# Patient Record
Sex: FEMALE | Race: WHITE | NOT HISPANIC OR LATINO | Employment: OTHER | URBAN - METROPOLITAN AREA
[De-identification: names, ages, dates, MRNs, and addresses within clinical notes are randomized per-mention and may not be internally consistent; named-entity substitution may affect disease eponyms.]

---

## 2017-02-07 ENCOUNTER — ALLSCRIPTS OFFICE VISIT (OUTPATIENT)
Dept: OTHER | Facility: OTHER | Age: 67
End: 2017-02-07

## 2017-03-08 ENCOUNTER — LAB CONVERSION - ENCOUNTER (OUTPATIENT)
Dept: OTHER | Facility: OTHER | Age: 67
End: 2017-03-08

## 2017-03-08 ENCOUNTER — GENERIC CONVERSION - ENCOUNTER (OUTPATIENT)
Dept: OTHER | Facility: OTHER | Age: 67
End: 2017-03-08

## 2017-03-08 LAB
DEPRECATED RDW RBC AUTO: 14.4 % (ref 11–15)
HCT VFR BLD AUTO: 42.8 % (ref 35–45)
HGB BLD-MCNC: 13.9 G/DL (ref 11.7–15.5)
MCH RBC QN AUTO: 26.9 PG (ref 27–33)
MCHC RBC AUTO-ENTMCNC: 32.4 G/DL (ref 32–36)
MCV RBC AUTO: 82.9 FL (ref 80–100)
PLATELET # BLD AUTO: 234 THOUSAND/UL (ref 140–400)
PMV BLD AUTO: 8.4 FL (ref 7.5–12.5)
RBC # BLD AUTO: 5.16 MILLION/UL (ref 3.8–5.1)
TSH SERPL DL<=0.05 MIU/L-ACNC: 0.27 MIU/L (ref 0.4–4.5)
WBC # BLD AUTO: 6.6 THOUSAND/UL (ref 3.8–10.8)

## 2017-03-09 ENCOUNTER — ALLSCRIPTS OFFICE VISIT (OUTPATIENT)
Dept: OTHER | Facility: OTHER | Age: 67
End: 2017-03-09

## 2017-04-13 ENCOUNTER — ALLSCRIPTS OFFICE VISIT (OUTPATIENT)
Dept: OTHER | Facility: OTHER | Age: 67
End: 2017-04-13

## 2017-04-13 DIAGNOSIS — M54.16 RADICULOPATHY OF LUMBAR REGION: ICD-10-CM

## 2017-04-14 ENCOUNTER — HOSPITAL ENCOUNTER (OUTPATIENT)
Dept: RADIOLOGY | Facility: CLINIC | Age: 67
Discharge: HOME | End: 2017-04-14
Payer: COMMERCIAL

## 2017-04-14 DIAGNOSIS — M54.16 RADICULOPATHY OF LUMBAR REGION: ICD-10-CM

## 2017-04-14 PROCEDURE — 72100 X-RAY EXAM L-S SPINE 2/3 VWS: CPT

## 2017-04-18 ENCOUNTER — GENERIC CONVERSION - ENCOUNTER (OUTPATIENT)
Dept: OTHER | Facility: OTHER | Age: 67
End: 2017-04-18

## 2017-04-25 ENCOUNTER — LAB CONVERSION - ENCOUNTER (OUTPATIENT)
Dept: OTHER | Facility: OTHER | Age: 67
End: 2017-04-25

## 2017-04-25 LAB — TSH SERPL DL<=0.05 MIU/L-ACNC: 1.39 MIU/L (ref 0.4–4.5)

## 2017-04-27 ENCOUNTER — ALLSCRIPTS OFFICE VISIT (OUTPATIENT)
Dept: OTHER | Facility: OTHER | Age: 67
End: 2017-04-27

## 2017-06-06 ENCOUNTER — GENERIC CONVERSION - ENCOUNTER (OUTPATIENT)
Dept: OTHER | Facility: OTHER | Age: 67
End: 2017-06-06

## 2017-06-07 ENCOUNTER — GENERIC CONVERSION - ENCOUNTER (OUTPATIENT)
Dept: OTHER | Facility: OTHER | Age: 67
End: 2017-06-07

## 2017-08-17 ENCOUNTER — ALLSCRIPTS OFFICE VISIT (OUTPATIENT)
Dept: OTHER | Facility: OTHER | Age: 67
End: 2017-08-17

## 2017-11-03 ENCOUNTER — LAB CONVERSION - ENCOUNTER (OUTPATIENT)
Dept: OTHER | Facility: OTHER | Age: 67
End: 2017-11-03

## 2017-11-03 LAB
A/G RATIO (HISTORICAL): 1.6 (CALC) (ref 1–2.5)
ALBUMIN SERPL BCP-MCNC: 4.1 G/DL (ref 3.6–5.1)
ALP SERPL-CCNC: 77 U/L (ref 33–130)
ALT SERPL W P-5'-P-CCNC: 9 U/L (ref 6–29)
AST SERPL W P-5'-P-CCNC: 13 U/L (ref 10–35)
BILIRUB SERPL-MCNC: 0.8 MG/DL (ref 0.2–1.2)
BUN SERPL-MCNC: 13 MG/DL (ref 7–25)
BUN/CREA RATIO (HISTORICAL): NORMAL (CALC) (ref 6–22)
CALCIUM SERPL-MCNC: 9.2 MG/DL (ref 8.6–10.4)
CHLORIDE SERPL-SCNC: 106 MMOL/L (ref 98–110)
CO2 SERPL-SCNC: 27 MMOL/L (ref 20–31)
CREAT SERPL-MCNC: 0.94 MG/DL (ref 0.5–0.99)
DEPRECATED RDW RBC AUTO: 13.1 % (ref 11–15)
EGFR AFRICAN AMERICAN (HISTORICAL): 73 ML/MIN/1.73M2
EGFR-AMERICAN CALC (HISTORICAL): 63 ML/MIN/1.73M2
GAMMA GLOBULIN (HISTORICAL): 2.6 G/DL (CALC) (ref 1.9–3.7)
GLUCOSE (HISTORICAL): 99 MG/DL (ref 65–99)
HCT VFR BLD AUTO: 40.6 % (ref 35–45)
HGB BLD-MCNC: 13.7 G/DL (ref 11.7–15.5)
MCH RBC QN AUTO: 29.1 PG (ref 27–33)
MCHC RBC AUTO-ENTMCNC: 33.7 G/DL (ref 32–36)
MCV RBC AUTO: 86.4 FL (ref 80–100)
PLATELET # BLD AUTO: 231 THOUSAND/UL (ref 140–400)
PMV BLD AUTO: 9.8 FL (ref 7.5–12.5)
POTASSIUM SERPL-SCNC: 4.3 MMOL/L (ref 3.5–5.3)
RBC # BLD AUTO: 4.7 MILLION/UL (ref 3.8–5.1)
SODIUM SERPL-SCNC: 143 MMOL/L (ref 135–146)
TOTAL PROTEIN (HISTORICAL): 6.7 G/DL (ref 6.1–8.1)
TSH SERPL DL<=0.05 MIU/L-ACNC: 3.1 MIU/L (ref 0.4–4.5)
WBC # BLD AUTO: 6.9 THOUSAND/UL (ref 3.8–10.8)

## 2017-11-06 ENCOUNTER — GENERIC CONVERSION - ENCOUNTER (OUTPATIENT)
Dept: OTHER | Facility: OTHER | Age: 67
End: 2017-11-06

## 2017-12-21 ENCOUNTER — GENERIC CONVERSION - ENCOUNTER (OUTPATIENT)
Dept: OTHER | Facility: OTHER | Age: 67
End: 2017-12-21

## 2017-12-28 ENCOUNTER — ALLSCRIPTS OFFICE VISIT (OUTPATIENT)
Dept: OTHER | Facility: OTHER | Age: 67
End: 2017-12-28

## 2018-01-10 NOTE — RESULT NOTES
Verified Results  (1) COMPREHENSIVE METABOLIC PANEL [id number] 48:40HK [employer]     Test Name Result Flag Reference   GLUCOSE 87 mg/dL  65-99   Fasting reference interval   UREA NITROGEN (BUN) 10 mg/dL  7-25   CREATININE 0 83 mg/dL  0 50-0 99   For patients >52years of age, the reference limit  for Creatinine is approximately 13% higher for people  identified as -American  eGFR NON-AFR  AMERICAN 75 mL/min/1 73m2  > OR = 60   eGFR AFRICAN AMERICAN 86 mL/min/1 73m2  > OR = 60   BUN/CREATININE RATIO   1-15   NOT APPLICABLE (calc)   SODIUM 142 mmol/L  135-146   POTASSIUM 4 4 mmol/L  3 5-5 3   CHLORIDE 107 mmol/L     CARBON DIOXIDE 26 mmol/L  20-31   CALCIUM 9 0 mg/dL  8 6-10 4   PROTEIN, TOTAL 6 5 g/dL  6 1-8 1   ALBUMIN 3 9 g/dL  3 6-5 1   GLOBULIN 2 6 g/dL (calc)  1 9-3 7   ALBUMIN/GLOBULIN RATIO 1 5 (calc)  1 0-2 5   BILIRUBIN, TOTAL 0 5 mg/dL  0 2-1 2   ALKALINE PHOSPHATASE 90 U/L     AST 14 U/L  10-35   ALT 9 U/L  6-29     (1) LIPID PANEL, FASTING [id number] 10:54AM Razia Alvarado     Test Name Result Flag Reference   CHOLESTEROL, TOTAL 183 mg/dL  125-200   HDL CHOLESTEROL 38 mg/dL L > OR = 46   TRIGLICERIDES 024 mg/dL H <150   LDL-CHOLESTEROL 98 mg/dL (calc)  <130   Desirable range <100 mg/dL for patients with CHD or  diabetes and <70 mg/dL for diabetic patients with  known heart disease  CHOL/HDLC RATIO 4 8 (calc)  < OR = 5 0   NON HDL CHOLESTEROL 145 mg/dL (calc)     Target for non-HDL cholesterol is 30 mg/dL higher than   LDL cholesterol target       (Q) MICROALBUMIN, RANDOM URINE (W/CREATININE) [id number] 10:54AM [employer]     Test Name Result Flag Reference   CREATININE, RANDOM URINE 243 mg/dL     MICROALBUMIN 1 4 mg/dL     Reference Range  Not established   MICROALBUMIN/CREATININE$RATIO, RANDOM URINE 6 mcg/mg creat  <30   The ADA defines abnormalities in albumin  excretion as follows:     Category         Result (mcg/mg creatinine)     Normal <30  Microalbuminuria            Clinical albuminuria   > OR = 300     The ADA recommends that at least two of three  specimens collected within a 3-6 month period be  abnormal before considering a patient to be  within a diagnostic category       (Q) CBC (H/H, RBC, INDICES, WBC, PLT) [id number] 10:54AM Janny Randall     Test Name Result Flag Reference   WHITE BLOOD CELL COUNT 7 6 Thousand/uL  3 8-10 8   RED BLOOD CELL COUNT 4 65 Million/uL  3 80-5 10   HEMOGLOBIN 12 7 g/dL  11 7-15 5   HEMATOCRIT 39 7 %  35 0-45 0   MCV 85 4 fL  80 0-100 0   MCH 27 4 pg  27 0-33 0   MCHC 32 1 g/dL  32 0-36 0   RDW 14 3 %  11 0-15 0   PLATELET COUNT 463 Thousand/uL  140-400   MPV 8 1 fL  7 5-11 5     (Q) TSH, 3RD GENERATION [id number] 10:54AM Raiza Alvarado   REPORT COMMENT:  FASTING:YES     Test Name Result Flag Reference   TSH 7 61 mIU/L H 0 40-4 50

## 2018-01-11 NOTE — RESULT NOTES
Verified Results  * XR SPINE LUMBAR 2 OR 3 VIEWS INJURY 14Apr2017 02:45PM Renetta Pa Order Number: [id number]     Test Name Result Flag Reference   XR SPINE LUMBAR 2 OR 3 VIEWS (Report)     LUMBAR SPINE     INDICATION: Back pain  COMPARISON: None     VIEWS: AP, lateral and coned-down projections     IMAGES: 3     FINDINGS:     Alignment is unremarkable  There is no radiographic evidence of acute fracture or destructive osseous lesion  Disc space narrowing seen at the L4-L5 and L5-S1 levels  Overall there is loss of normal lordosis which may represent spasm     Visualized soft tissues appear unremarkable  IMPRESSION:     Disc space narrowing L4-L5 and L5-S1 levels         Workstation performed: [id number]     Signed by:   Kylie Oliveira MD   4/16/17

## 2018-01-13 VITALS
BODY MASS INDEX: 35.53 KG/M2 | SYSTOLIC BLOOD PRESSURE: 110 MMHG | WEIGHT: 181 LBS | HEART RATE: 74 BPM | DIASTOLIC BLOOD PRESSURE: 68 MMHG | RESPIRATION RATE: 16 BRPM | HEIGHT: 60 IN

## 2018-01-13 VITALS
RESPIRATION RATE: 16 BRPM | WEIGHT: 181 LBS | HEART RATE: 77 BPM | DIASTOLIC BLOOD PRESSURE: 70 MMHG | SYSTOLIC BLOOD PRESSURE: 112 MMHG | HEIGHT: 60 IN | BODY MASS INDEX: 35.53 KG/M2

## 2018-01-13 NOTE — RESULT NOTES
Message   normal tsh      Verified Results  (Q) TSH, 3RD GENERATION 31Oct2016 01:42PM Raiza Alvarado   REPORT COMMENT:  FASTING:NO     Test Name Result Flag Reference   TSH 4 05 mIU/L  0 40-4 50       Discussion/Summary   normal tsh , continue same med levothyroxin,

## 2018-01-14 VITALS
HEIGHT: 60 IN | SYSTOLIC BLOOD PRESSURE: 140 MMHG | WEIGHT: 182 LBS | RESPIRATION RATE: 16 BRPM | TEMPERATURE: 99 F | HEART RATE: 72 BPM | DIASTOLIC BLOOD PRESSURE: 80 MMHG | BODY MASS INDEX: 35.73 KG/M2

## 2018-01-14 VITALS
HEART RATE: 64 BPM | TEMPERATURE: 99.1 F | RESPIRATION RATE: 17 BRPM | BODY MASS INDEX: 35.56 KG/M2 | DIASTOLIC BLOOD PRESSURE: 68 MMHG | SYSTOLIC BLOOD PRESSURE: 124 MMHG | WEIGHT: 181.13 LBS | HEIGHT: 60 IN | OXYGEN SATURATION: 98 %

## 2018-01-14 VITALS
RESPIRATION RATE: 16 BRPM | SYSTOLIC BLOOD PRESSURE: 128 MMHG | HEART RATE: 86 BPM | BODY MASS INDEX: 35.53 KG/M2 | HEIGHT: 60 IN | WEIGHT: 181 LBS | TEMPERATURE: 98.4 F | DIASTOLIC BLOOD PRESSURE: 80 MMHG

## 2018-01-14 NOTE — RESULT NOTES
Message   Normal labs      Verified Results  (1) LIPID PANEL, FASTING [id number] 12:05PM [employer]     Test Name Result Flag Reference   CHOLESTEROL, TOTAL 143 mg/dL  125-200   HDL CHOLESTEROL 43 mg/dL L > OR = 46   TRIGLICERIDES 644 mg/dL  <150   LDL-CHOLESTEROL 75 mg/dL (calc)  <130   Desirable range <100 mg/dL for patients with CHD or  diabetes and <70 mg/dL for diabetic patients with  known heart disease  CHOL/HDLC RATIO 3 3 (calc)  < OR = 5 0   NON HDL CHOLESTEROL 100 mg/dL (calc)     Target for non-HDL cholesterol is 30 mg/dL higher than   LDL cholesterol target  (Q) MICROALBUMIN, RANDOM URINE (W/CREATININE) [id number] 12:05PM [facility]     Test Name Result Flag Reference   CREATININE, RANDOM URINE 223 mg/dL     MICROALBUMIN 1 8 mg/dL     Reference Range  Not established   MICROALBUMIN/CREATININE$RATIO, RANDOM URINE 8 mcg/mg creat  <30   The ADA defines abnormalities in albumin  excretion as follows:     Category         Result (mcg/mg creatinine)     Normal                    <30  Microalbuminuria            Clinical albuminuria   > OR = 300     The ADA recommends that at least two of three  specimens collected within a 3-6 month period be  abnormal before considering a patient to be  within a diagnostic category  (1) COMPREHENSIVE METABOLIC PANEL [id number] 84:75HT [facility]     Test Name Result Flag Reference   GLUCOSE 88 mg/dL  65-99   Fasting reference interval   UREA NITROGEN (BUN) 16 mg/dL  7-25   CREATININE 0 76 mg/dL  0 50-0 99   For patients >52years of age, the reference limit  for Creatinine is approximately 13% higher for people  identified as -American  eGFR NON-AFR   AMERICAN 83 mL/min/1 73m2  > OR = 60   eGFR AFRICAN AMERICAN 96 mL/min/1 73m2  > OR = 60   BUN/CREATININE RATIO   3-86   NOT APPLICABLE (calc)   SODIUM 140 mmol/L  135-146   POTASSIUM 4 2 mmol/L  3 5-5 3   CHLORIDE 107 mmol/L     CARBON DIOXIDE 25 mmol/L  19-30   CALCIUM 9 2 mg/dL 8  6-10 4   PROTEIN, TOTAL 6 7 g/dL  6 1-8 1   ALBUMIN 4 2 g/dL  3 6-5 1   GLOBULIN 2 5 g/dL (calc)  1 9-3 7   ALBUMIN/GLOBULIN RATIO 1 7 (calc)  1 0-2 5   BILIRUBIN, TOTAL 0 6 mg/dL  0 2-1 2   ALKALINE PHOSPHATASE 71 U/L     AST 16 U/L  10-35   ALT 9 U/L  6-29     (Q) CBC (H/H, RBC, INDICES, WBC, PLT) [id number] 12:05PM Raiza Alvarado     Test Name Result Flag Reference   WHITE BLOOD CELL COUNT 6 7 Thousand/uL  3 8-10 8   RED BLOOD CELL COUNT 4 86 Million/uL  3 80-5 10   HEMOGLOBIN 13 3 g/dL  11 7-15 5   HEMATOCRIT 42 2 %  35 0-45 0   MCV 86 9 fL  80 0-100 0   MCH 27 3 pg  27 0-33 0   MCHC 31 5 g/dL L 32 0-36 0   RDW 14 5 %  11 0-15 0   PLATELET COUNT 984 Thousand/uL  140-400   MPV 8 9 fL  7 5-11 5     (Q) TSH, 3RD GENERATION [id number] 12:05PM Aquilino Rocha     Test Name Result Flag Reference   TSH 0 65 mIU/L  0 40-4 50     (Q) HEMOGLOBIN A1c [id number] 12:05PM Raiza Alvarado   REPORT COMMENT:  FASTING:YES     Test Name Result Flag Reference   HEMOGLOBIN A1c 5 5 % of total Hgb  <5 7   According to ADA guidelines, hemoglobin A1c <7 0%  represents optimal control in non-pregnant diabetic  patients  Different metrics may apply to specific  patient populations  Standards of Medical Care in    Diabetes Care  2013;36:s11-s66     For the purpose of screening for the presence of  diabetes  <5 7%       Consistent with the absence of diabetes  5 7-6 4%    Consistent with increased risk for diabetes              (prediabetes)  >or=6 5%    Consistent with diabetes     This assay result is consistent with a decreased risk  of diabetes  Currently, no consensus exists for use of hemoglobin  A1c for diagnosis of diabetes for children         Discussion/Summary   Normal labs

## 2018-01-15 NOTE — RESULT NOTES
Message   abnormal tsh      Verified Results  (1) COMPREHENSIVE METABOLIC PANEL [id number] 16:72QQ [facility]     Test Name Result Flag Reference   GLUCOSE 93 mg/dL  65-99   Fasting reference interval   UREA NITROGEN (BUN) 10 mg/dL  7-25   CREATININE 0 87 mg/dL  0 50-0 99   For patients >52years of age, the reference limit  for Creatinine is approximately 13% higher for people  identified as -American  eGFR NON-AFR  AMERICAN 70 mL/min/1 73m2  > OR = 60   eGFR AFRICAN AMERICAN 81 mL/min/1 73m2  > OR = 60   BUN/CREATININE RATIO   9-03   NOT APPLICABLE (calc)   ALT 10 U/L  6-29   ALBUMIN 4 0 g/dL  3 6-5 1   GLOBULIN 2 5 g/dL (calc)  1 9-3 7   ALBUMIN/GLOBULIN RATIO 1 6 (calc)  1 0-2 5   BILIRUBIN, TOTAL 0 6 mg/dL  0 2-1 2   ALKALINE PHOSPHATASE 90 U/L     AST 14 U/L  10-35   SODIUM 143 mmol/L  135-146   POTASSIUM 4 1 mmol/L  3 5-5 3   CHLORIDE 108 mmol/L     CARBON DIOXIDE 27 mmol/L  20-31   CALCIUM 9 3 mg/dL  8 6-10 4   PROTEIN, TOTAL 6 5 g/dL  6 1-8 1     (1) LIPID PANEL, FASTING [id number] 11:09AM [employer]     Test Name Result Flag Reference   CHOLESTEROL, TOTAL 146 mg/dL  125-200   HDL CHOLESTEROL 39 mg/dL L > OR = 46   TRIGLICERIDES 379 mg/dL H <150   LDL-CHOLESTEROL 67 mg/dL (calc)  <130   Desirable range <100 mg/dL for patients with CHD or  diabetes and <70 mg/dL for diabetic patients with  known heart disease  CHOL/HDLC RATIO 3 7 (calc)  < OR = 5 0   NON HDL CHOLESTEROL 107 mg/dL (calc)     Target for non-HDL cholesterol is 30 mg/dL higher than   LDL cholesterol target       (Q) CBC (H/H, RBC, INDICES, WBC, PLT) [id number] 11:09AM [employer]     Test Name Result Flag Reference   WHITE BLOOD CELL COUNT 6 6 Thousand/uL  3 8-10 8   RED BLOOD CELL COUNT 5 16 Million/uL H 3 80-5 10   HEMOGLOBIN 13 9 g/dL  11 7-15 5   HEMATOCRIT 42 8 %  35 0-45 0   MCV 82 9 fL  80 0-100 0   MCH 26 9 pg L 27 0-33 0   MCHC 32 4 g/dL  32 0-36 0   RDW 14 4 %  11 0-15 0   PLATELET COUNT 137 Thousand/uL 140-400   MPV 8 4 fL  7 5-12 5     (Q) TSH, 3RD GENERATION [id number] 11:09AM Sundar Franco   REPORT COMMENT:  FASTING:YES     Test Name Result Flag Reference   TSH 0 27 mIU/L L 0 40-4 50

## 2018-01-17 NOTE — RESULT NOTES
Verified Results  (1) COMPREHENSIVE METABOLIC PANEL [id number] 11:84DQ Joaquin Goodman     Test Name Result Flag Reference   GLUCOSE 99 mg/dL  65-99   Fasting reference interval   UREA NITROGEN (BUN) 13 mg/dL  7-25   CREATININE 0 94 mg/dL  0 50-0 99   For patients >52years of age, the reference limit  for Creatinine is approximately 13% higher for people  identified as -American  eGFR NON-AFR   AMERICAN 63 mL/min/1 73m2  > OR = 60   eGFR AFRICAN AMERICAN 73 mL/min/1 73m2  > OR = 60   BUN/CREATININE RATIO   9-06   NOT APPLICABLE (calc)   SODIUM 143 mmol/L  135-146   POTASSIUM 4 3 mmol/L  3 5-5 3   CHLORIDE 106 mmol/L     CARBON DIOXIDE 27 mmol/L  20-31   CALCIUM 9 2 mg/dL  8 6-10 4   PROTEIN, TOTAL 6 7 g/dL  6 1-8 1   ALBUMIN 4 1 g/dL  3 6-5 1   GLOBULIN 2 6 g/dL (calc)  1 9-3 7   ALBUMIN/GLOBULIN RATIO 1 6 (calc)  1 0-2 5   BILIRUBIN, TOTAL 0 8 mg/dL  0 2-1 2   ALKALINE PHOSPHATASE 77 U/L     AST 13 U/L  10-35   ALT 9 U/L  6-29     (Q) CBC (H/H, RBC, INDICES, WBC, PLT) [id number] 11:11AM Raiza Alvarado     Test Name Result Flag Reference   WHITE BLOOD CELL COUNT 6 9 Thousand/uL  3 8-10 8   RED BLOOD CELL COUNT 4 70 Million/uL  3 80-5 10   HEMOGLOBIN 13 7 g/dL  11 7-15 5   HEMATOCRIT 40 6 %  35 0-45 0   MCV 86 4 fL  80 0-100 0   MCH 29 1 pg  27 0-33 0   MCHC 33 7 g/dL  32 0-36 0   RDW 13 1 %  11 0-15 0   PLATELET COUNT 973 Thousand/uL  140-400   MPV 9 8 fL  7 5-12 5     (Q) TSH, 3RD GENERATION [id number] 11:11AM Raiza Alvarado   REPORT COMMENT:  FASTING:YES     Test Name Result Flag Reference   TSH 3 10 mIU/L  0 40-4 50

## 2018-01-17 NOTE — RESULT NOTES
Message   Ordered diagnostic mammogram and ultrasound for right breast     Verified Results  [facility] SCREENING BILATERAL W CAD [id number] 02:33PM Sylvia Hartman Order Number: [id number]    - Patient Instructions: To schedule this appointment, please contact Central Scheduling at [phone]  Do not wear any perfume, powder, lotion or deodorant on breast or underarm area  Please bring your doctors order, referral (if needed) and insurance information with you on the day of the test  Failure to bring this information may result in this test being rescheduled  Arrive 15 minutes prior to your appointment time to register  On the day of your test, please bring any prior mammogram or breast studies with you that were not performed at a [facility]  Failure to bring prior exams may result in your test needing to be rescheduled   Order Number: [id number]    - Patient Instructions: To schedule this appointment, please contact Central Scheduling at [phone]  Do not wear any perfume, powder, lotion or deodorant on breast or underarm area  Please bring your doctors order, referral (if needed) and insurance information with you on the day of the test  Failure to bring this information may result in this test being rescheduled  Arrive 15 minutes prior to your appointment time to register  On the day of your test, please bring any prior mammogram or breast studies with you that were not performed at a [facility]  Failure to bring prior exams may result in your test needing to be rescheduled  Test Name Result Flag Reference   MAMMO SCREENING BILATERAL W CAD (Report)     Patient History:   Patient is postmenopausal    Taking unspecified hormones  Patient's BMI is 33 2  Reason for exam: screening (asymptomatic)  Screening     Mammo Screening Bilateral W CAD: September 8, 2016 - Check In #:    [de-identified]   Bilateral CC and MLO view(s) were taken       Technologist: [facility]   Prior study comparison: March 24, 2004, bilateral screening    mammogram, performed at [address] [place] [address]  There are scattered fibroglandular densities  An oval-shaped    nodular density measuring 1 4 x 0 8 cm is seen in the mid, outer    right breast, not identified previously  No clustered microcalcifications  A generator overlies the left axillary region  The left breast is otherwise unremarkable  Needs additional imaging  ASSESSMENT: BiRad:0 - Incomplete: needs additional imaging    evaluation - Right     Recommendation:   Follow-up diagnostic mammogram and ultrasound of the right    breast    Analyzed by CAD     8-10% of cancers will be missed on mammography  Management of a    palpable abnormality must be based on clinical grounds  Patients   will be notified of their results via letter from our facility  Accredited by [employer] of [employer] and FDA  Transcription Location: [facility]: [id number]     Risk Value(s):   Tyrer-Cuzick 10 Year: 3 040%, Tyrer-Cuzick Lifetime: 6 603%,    Myriad Table: 1 5%, BRODY 5 Year: 1 8%, NCI Lifetime: 7 2%   Signed by:   Julien Tapia MD   9/8/16     (1) COMPREHENSIVE METABOLIC PANEL [id number] 72:96JK Mimi Cantor     Test Name Result Flag Reference   ALT 9 U/L  6-29   AST 14 U/L  10-35   ALKALINE PHOSPHATASE 90 U/L     BILIRUBIN, TOTAL 0 5 mg/dL  0 2-1 2   ALBUMIN/GLOBULIN RATIO 1 5 (calc)  1 0-2 5   GLOBULIN 2 6 g/dL (calc)  1 9-3 7   ALBUMIN 3 9 g/dL  3 6-5 1   PROTEIN, TOTAL 6 5 g/dL  6 1-8 1   CALCIUM 9 0 mg/dL  8 6-10 4   CARBON DIOXIDE 26 mmol/L  20-31   CHLORIDE 107 mmol/L     POTASSIUM 4 4 mmol/L  3 5-5 3   SODIUM 142 mmol/L  135-146   BUN/CREATININE RATIO   1-23   NOT APPLICABLE (calc)   eGFR AFRICAN AMERICAN 86 mL/min/1 73m2  > OR = 60   eGFR NON-AFR   AMERICAN 75 mL/min/1 73m2  > OR = 60   CREATININE 0 83 mg/dL  0 50-0 99   For patients >52years of age, the reference limit  for Creatinine is approximately 13% higher for people  identified as -American  UREA NITROGEN (BUN) 10 mg/dL  7-25   GLUCOSE 87 mg/dL  65-99   Fasting reference interval     (1) LIPID PANEL, FASTING [id number] 10:54AM Liz Shan     Test Name Result Flag Reference   NON HDL CHOLESTEROL 145 mg/dL (calc)     Target for non-HDL cholesterol is 30 mg/dL higher than   LDL cholesterol target  CHOL/HDLC RATIO 4 8 (calc)  < OR = 5 0   LDL-CHOLESTEROL 98 mg/dL (calc)  <130   Desirable range <100 mg/dL for patients with CHD or  diabetes and <70 mg/dL for diabetic patients with  known heart disease  TRIGLICERIDES 234 mg/dL H <150   HDL CHOLESTEROL 38 mg/dL L > OR = 46   CHOLESTEROL, TOTAL 183 mg/dL  125-200     (Q) MICROALBUMIN, RANDOM URINE (W/CREATININE) [id number] 10:54AM Liz Shan     Test Name Result Flag Reference   MICROALBUMIN/CREATININE$RATIO, RANDOM URINE 6 mcg/mg creat  <30   The ADA defines abnormalities in albumin  excretion as follows:     Category         Result (mcg/mg creatinine)     Normal                    <30  Microalbuminuria            Clinical albuminuria   > OR = 300     The ADA recommends that at least two of three  specimens collected within a 3-6 month period be  abnormal before considering a patient to be  within a diagnostic category     MICROALBUMIN 1 4 mg/dL     Reference Range  Not established   CREATININE, RANDOM URINE 243 mg/dL       (Q) CBC (H/H, RBC, INDICES, WBC, PLT) [id number] 10:54AM Liz Shan     Test Name Result Flag Reference   MCHC 32 1 g/dL  32 0-36 0   RDW 14 3 %  11 0-15 0   PLATELET COUNT 866 Thousand/uL  140-400   MPV 8 1 fL  7 5-11 5   MCH 27 4 pg  27 0-33 0   MCV 85 4 fL  80 0-100 0   HEMATOCRIT 39 7 %  35 0-45 0   HEMOGLOBIN 12 7 g/dL  11 7-15 5   RED BLOOD CELL COUNT 4 65 Million/uL  3 80-5 10   WHITE BLOOD CELL COUNT 7 6 Thousand/uL  3 8-10 8     (Q) TSH, 3RD GENERATION [id number] 10:54AM Raiza Alvarado   REPORT COMMENT:  FASTING:YES Test Name Result Flag Reference   TSH 7 61 mIU/L H 0 40-4 50

## 2018-01-18 NOTE — RESULT NOTES
Verified Results  *US BREAST RIGHT LIMITED (DIAGNOSTIC) [id number] 10:20AM Kedar Riggs Order Number: [id number]    - Patient Instructions: To schedule this appointment, please contact Central Scheduling at [phone]   Order Number: [id number]    - Patient Instructions: To schedule this appointment, please contact Central Scheduling at [phone]  Test Name Result Flag Reference   US BREAST RIGHT LIMITED (Report)     Patient History:   Patient is postmenopausal    Took unspecified hormones  Patient's BMI is 33 2  Reason for exam: additional evaluation requested from prior    study  Abnormal screening study     Mammo Diagnostic Right W CAD: September 22, 2016 - Check In #:    [de-identified]   CC, MLO, and ML view(s) were taken of the right breast      Technologist: Loi Vickers   Prior study comparison: September 8, 2016, mammo screening    bilateral W CAD performed at [place] [address]  March 24, 2004, bilateral screening mammogram, performed at [address] [place] [address]  There are scattered fibroglandular densities  Oval-shaped    density identified in the mid outer right breast persists on    compression views  This is demonstrated to represent a cyst on    ultrasound  Oval-shaped nodular density demonstrated to represent    a cyst      US Breast Right Limited: September 22, 2016 - Check In #: [de-identified]   Technologist: Lia Metzger   Real-time images are obtained in the right breast  At the 9    o'clock position, 6 cm from the nipple is an oval-shaped simple    cyst measuring 0 9 x 0 5 x 0 8 cm  This corresponds to the    mammographic finding   IMPRESSION:Simple cyst at the 9 o'clock position       ASSESSMENT: BiRad:2 - Benign (Overall)   Right breast Right Diag Mamm: BiRad:2 - benign finding in the    right breast    Right breast Right Brst US: BiRad:2 - benign finding in the right   breast      Recommendation:   Routine screening mammogram of both breasts in 1 year  Analyzed by CAD     Transcription Location: [facility] 98: [id number]     Risk Value(s):   Tyrer-Cuzick 10 Year: 3 000%, Tyrer-Cuzick Lifetime: 6 268%,    Myriad Table: 1 5%, BRODY 5 Year: 1 8%, NCI Lifetime: 6 9%   Signed by:   Migue Whatley MD   9/22/16     MAMMO DIAGNOSTIC RIGHT W CAD [id number] 10:18AM Cony Arreola Order Number: [id number]    - Patient Instructions: To schedule this appointment, please contact Central Scheduling at [phone]  Do not wear any perfume, powder, lotion or deodorant on breast or underarm area  Please bring your doctors order, referral (if needed) and insurance information with you on the day of the test  Failure to bring this information may result in this test being rescheduled  Arrive 15 minutes prior to your appointment time to register  On the day of your test, please bring any prior mammogram or breast studies with you that were not performed at a [facility]  Failure to bring prior exams may result in your test needing to be rescheduled   Order Number: [id number]    - Patient Instructions: To schedule this appointment, please contact Central Scheduling at [phone]  Do not wear any perfume, powder, lotion or deodorant on breast or underarm area  Please bring your doctors order, referral (if needed) and insurance information with you on the day of the test  Failure to bring this information may result in this test being rescheduled  Arrive 15 minutes prior to your appointment time to register  On the day of your test, please bring any prior mammogram or breast studies with you that were not performed at a [facility]  Failure to bring prior exams may result in your test needing to be rescheduled  Test Name Result Flag Reference   MAMMO DIAGNOSTIC RIGHT W CAD (Report)     Patient History:   Patient is postmenopausal    Took unspecified hormones  Patient's BMI is 33 2  Reason for exam: additional evaluation requested from prior    study  Abnormal screening study     Mammo Diagnostic Right W CAD: September 22, 2016 - Check In #:    [de-identified]   CC, MLO, and ML view(s) were taken of the right breast      Technologist: Biju Aleman   Prior study comparison: September 8, 2016, mammo screening    bilateral W CAD performed at [address] [facility]  March 24, 2004, bilateral screening mammogram, performed at [facility]  There are scattered fibroglandular densities  Oval-shaped    density identified in the mid outer right breast persists on    compression views  This is demonstrated to represent a cyst on    ultrasound  Oval-shaped nodular density demonstrated to represent    a cyst      US Breast Right Limited: September 22, 2016 - Check In #: [de-identified]   Technologist: Lindsay Jung   Real-time images are obtained in the right breast  At the 9    o'clock position, 6 cm from the nipple is an oval-shaped simple    cyst measuring 0 9 x 0 5 x 0 8 cm  This corresponds to the    mammographic finding   IMPRESSION:Simple cyst at the 9 o'clock position  ASSESSMENT: BiRad:2 - Benign (Overall)   Right breast Right Diag Mamm: BiRad:2 - benign finding in the    right breast    Right breast Right Brst US: BiRad:2 - benign finding in the right   breast      Recommendation:   Routine screening mammogram of both breasts in 1 year  Analyzed by CAD     Transcription Location: [facility] 98: [id number]     Risk Value(s):   Tyrer-Cuzick 10 Year: 3 000%, Tyrer-Cuzick Lifetime: 6 268%,    Myriad Table: 1 5%, BRODY 5 Year: 1 8%, NCI Lifetime: 6 9%   Signed by:   Rusty Steele MD   9/22/16       Discussion/Summary   routine mammo in one year   has simple cyst

## 2018-01-22 VITALS
DIASTOLIC BLOOD PRESSURE: 74 MMHG | HEIGHT: 60 IN | SYSTOLIC BLOOD PRESSURE: 108 MMHG | RESPIRATION RATE: 18 BRPM | TEMPERATURE: 98.4 F | WEIGHT: 179 LBS | BODY MASS INDEX: 35.14 KG/M2 | HEART RATE: 84 BPM | OXYGEN SATURATION: 98 %

## 2018-01-24 VITALS
OXYGEN SATURATION: 98 % | DIASTOLIC BLOOD PRESSURE: 78 MMHG | HEIGHT: 60 IN | SYSTOLIC BLOOD PRESSURE: 126 MMHG | BODY MASS INDEX: 35.14 KG/M2 | TEMPERATURE: 98.2 F | HEART RATE: 74 BPM | WEIGHT: 179 LBS

## 2018-02-05 DIAGNOSIS — E03.8 OTHER SPECIFIED HYPOTHYROIDISM: Primary | ICD-10-CM

## 2018-02-05 RX ORDER — LEVOTHYROXINE SODIUM 0.07 MG/1
TABLET ORAL
Qty: 90 TABLET | Refills: 1 | Status: SHIPPED | OUTPATIENT
Start: 2018-02-05 | End: 2018-02-22

## 2018-02-22 DIAGNOSIS — E78.2 MIXED HYPERLIPIDEMIA: Primary | ICD-10-CM

## 2018-02-22 RX ORDER — FLUTICASONE PROPIONATE 50 MCG
1 SPRAY, SUSPENSION (ML) NASAL DAILY
COMMUNITY
Start: 2017-08-17 | End: 2018-04-26 | Stop reason: SDUPTHER

## 2018-02-22 RX ORDER — RAMIPRIL 10 MG/1
CAPSULE ORAL DAILY
COMMUNITY
Start: 2014-12-04 | End: 2020-11-09

## 2018-02-22 RX ORDER — ATORVASTATIN CALCIUM 20 MG/1
TABLET, FILM COATED ORAL
Refills: 0 | COMMUNITY
Start: 2017-11-24 | End: 2018-02-22 | Stop reason: SDUPTHER

## 2018-02-22 RX ORDER — ATORVASTATIN CALCIUM 20 MG/1
20 TABLET, FILM COATED ORAL DAILY
Qty: 90 TABLET | Refills: 0 | Status: SHIPPED | OUTPATIENT
Start: 2018-02-22 | End: 2018-07-31 | Stop reason: SDUPTHER

## 2018-02-22 RX ORDER — FERROUS SULFATE 325(65) MG
1 TABLET ORAL DAILY
Refills: 0 | COMMUNITY
Start: 2018-01-10 | End: 2018-07-05 | Stop reason: SDUPTHER

## 2018-02-22 RX ORDER — TRAZODONE HYDROCHLORIDE 150 MG/1
150 TABLET ORAL DAILY
COMMUNITY
Start: 2018-01-28 | End: 2021-02-08

## 2018-02-22 RX ORDER — ALPRAZOLAM 1 MG/1
TABLET ORAL
COMMUNITY
Start: 2018-01-09 | End: 2021-02-08

## 2018-02-22 RX ORDER — CARVEDILOL 12.5 MG/1
12.5 TABLET ORAL 2 TIMES DAILY
COMMUNITY
Start: 2018-01-30

## 2018-02-22 RX ORDER — OMEGA-3-ACID ETHYL ESTERS 1 G/1
CAPSULE, LIQUID FILLED ORAL
COMMUNITY
Start: 2018-01-25 | End: 2020-11-09

## 2018-02-22 RX ORDER — LEVOTHYROXINE SODIUM 0.07 MG/1
1 TABLET ORAL
COMMUNITY
Start: 2015-04-01 | End: 2019-02-13 | Stop reason: SDUPTHER

## 2018-02-22 RX ORDER — ESCITALOPRAM OXALATE 10 MG/1
20 TABLET ORAL
COMMUNITY
Start: 2018-02-04 | End: 2019-02-13

## 2018-03-15 LAB
ALBUMIN SERPL-MCNC: 3.8 G/DL (ref 3.6–5.1)
ALBUMIN/CREAT UR: 12 MCG/MG CREAT
ALBUMIN/GLOB SERPL: 1.4 (CALC) (ref 1–2.5)
ALP SERPL-CCNC: 70 U/L (ref 33–130)
ALT SERPL-CCNC: 9 U/L (ref 6–29)
AST SERPL-CCNC: 13 U/L (ref 10–35)
BILIRUB SERPL-MCNC: 0.9 MG/DL (ref 0.2–1.2)
BUN SERPL-MCNC: 13 MG/DL (ref 7–25)
BUN/CREAT SERPL: 13 (CALC) (ref 6–22)
CALCIUM SERPL-MCNC: 9 MG/DL (ref 8.6–10.4)
CHLORIDE SERPL-SCNC: 106 MMOL/L (ref 98–110)
CHOLEST SERPL-MCNC: 139 MG/DL
CHOLEST/HDLC SERPL: 3.6 (CALC)
CO2 SERPL-SCNC: 30 MMOL/L (ref 20–31)
CREAT SERPL-MCNC: 1 MG/DL (ref 0.5–0.99)
CREAT UR-MCNC: 266 MG/DL (ref 20–320)
ERYTHROCYTE [DISTWIDTH] IN BLOOD BY AUTOMATED COUNT: 12.7 % (ref 11–15)
GLOBULIN SER CALC-MCNC: 2.8 G/DL (CALC) (ref 1.9–3.7)
GLUCOSE SERPL-MCNC: 91 MG/DL (ref 65–99)
HCT VFR BLD AUTO: 43.1 % (ref 35–45)
HDLC SERPL-MCNC: 39 MG/DL
HGB BLD-MCNC: 13.8 G/DL (ref 11.7–15.5)
LDLC SERPL CALC-MCNC: 77 MG/DL (CALC)
MCH RBC QN AUTO: 27.9 PG (ref 27–33)
MCHC RBC AUTO-ENTMCNC: 32 G/DL (ref 32–36)
MCV RBC AUTO: 87.1 FL (ref 80–100)
MICROALBUMIN UR-MCNC: 3.2 MG/DL
NONHDLC SERPL-MCNC: 100 MG/DL (CALC)
PLATELET # BLD AUTO: 189 THOUSAND/UL (ref 140–400)
PMV BLD REES-ECKER: 10.1 FL (ref 7.5–12.5)
POTASSIUM SERPL-SCNC: 3.9 MMOL/L (ref 3.5–5.3)
PROT SERPL-MCNC: 6.6 G/DL (ref 6.1–8.1)
RBC # BLD AUTO: 4.95 MILLION/UL (ref 3.8–5.1)
SL AMB EGFR AFRICAN AMERICAN: 68 ML/MIN/1.73M2
SL AMB EGFR NON AFRICAN AMERICAN: 59 ML/MIN/1.73M2
SODIUM SERPL-SCNC: 143 MMOL/L (ref 135–146)
TRIGL SERPL-MCNC: 129 MG/DL
TSH SERPL-ACNC: 2.66 MIU/L (ref 0.4–4.5)
WBC # BLD AUTO: 7.8 THOUSAND/UL (ref 3.8–10.8)

## 2018-04-26 ENCOUNTER — OFFICE VISIT (OUTPATIENT)
Dept: FAMILY MEDICINE CLINIC | Facility: CLINIC | Age: 68
End: 2018-04-26
Payer: COMMERCIAL

## 2018-04-26 VITALS
OXYGEN SATURATION: 98 % | RESPIRATION RATE: 16 BRPM | BODY MASS INDEX: 34.95 KG/M2 | DIASTOLIC BLOOD PRESSURE: 72 MMHG | HEIGHT: 60 IN | HEART RATE: 81 BPM | SYSTOLIC BLOOD PRESSURE: 120 MMHG | WEIGHT: 178 LBS

## 2018-04-26 DIAGNOSIS — I42.9 CARDIOMYOPATHY, UNSPECIFIED TYPE (HCC): ICD-10-CM

## 2018-04-26 DIAGNOSIS — F41.9 ANXIETY: ICD-10-CM

## 2018-04-26 DIAGNOSIS — E78.00 HYPERCHOLESTEROLEMIA: ICD-10-CM

## 2018-04-26 DIAGNOSIS — D50.8 OTHER IRON DEFICIENCY ANEMIA: ICD-10-CM

## 2018-04-26 DIAGNOSIS — J30.1 SEASONAL ALLERGIC RHINITIS DUE TO POLLEN: ICD-10-CM

## 2018-04-26 DIAGNOSIS — E03.9 HYPOTHYROIDISM, UNSPECIFIED TYPE: Primary | ICD-10-CM

## 2018-04-26 DIAGNOSIS — I10 BENIGN ESSENTIAL HYPERTENSION: ICD-10-CM

## 2018-04-26 DIAGNOSIS — Z12.31 BREAST CANCER SCREENING BY MAMMOGRAM: ICD-10-CM

## 2018-04-26 PROBLEM — M54.16 LUMBAR RADICULOPATHY: Status: ACTIVE | Noted: 2017-04-13

## 2018-04-26 PROCEDURE — 3008F BODY MASS INDEX DOCD: CPT | Performed by: FAMILY MEDICINE

## 2018-04-26 PROCEDURE — 3078F DIAST BP <80 MM HG: CPT | Performed by: FAMILY MEDICINE

## 2018-04-26 PROCEDURE — 3074F SYST BP LT 130 MM HG: CPT | Performed by: FAMILY MEDICINE

## 2018-04-26 PROCEDURE — 99214 OFFICE O/P EST MOD 30 MIN: CPT | Performed by: FAMILY MEDICINE

## 2018-04-26 RX ORDER — FLUTICASONE PROPIONATE 50 MCG
2 SPRAY, SUSPENSION (ML) NASAL DAILY
Qty: 16 G | Refills: 1 | Status: SHIPPED | OUTPATIENT
Start: 2018-04-26 | End: 2018-07-10 | Stop reason: SDUPTHER

## 2018-04-26 RX ORDER — MULTIVIT-MIN/IRON/FOLIC ACID/K 18-600-40
CAPSULE ORAL
COMMUNITY
End: 2021-02-08

## 2018-04-26 NOTE — PROGRESS NOTES
Assessment/Plan:    Problem List Items Addressed This Visit        Endocrine    Hypothyroidism - Primary     tsh normal , cont same dose          Relevant Orders    TSH, 3rd generation       Respiratory    Allergic rhinitis     Nasal spray prn          Relevant Medications    fluticasone (FLONASE) 50 mcg/act nasal spray       Cardiovascular and Mediastinum    Benign essential hypertension     Stable , cont same ,          Relevant Orders    CBC and differential    Comprehensive metabolic panel    Cardiomyopathy ([facility] [address] )     Cont f/u with dr Hager cardiologist , has ICD/pacemaker             Other    Anxiety     Stable , follows psychiatrist , dr Israel Foley          Hypercholesterolemia    Relevant Orders    Lipid panel    Iron deficiency anemia     Lab Results   Component Value Date    HCT 43 1 03/14/2018   hb 13 8, cont same             Other Visit Diagnoses     Breast cancer screening by mammogram        Relevant Orders    Mammo screening bilateral w cad          Chief Complaint   Patient presents with    Follow-up       Subjective:   Patient ID: Franco Dunbar is a 78 y o  female  She is here follow-up on her labs, she has cardiomyopathy and follows Dr Rosetta Blanton cardiologist, she has ICD and pacemaker and she gets a checkup regularly, she denies any chest pain shortness of breath leg swelling    She has hypothyroidism on levothyroxine and she takes iron daily her anemia has been stable now  She has anxiety and she follows psychiatrist and she has been taking all her medications regularly  She had a sudden loss in the family she says father of her grandson passed away and they are going through his loss  She says that when season change she feels some congestion, and she is out of her nasal spray  Review of Systems   Constitutional: Negative for activity change, appetite change, chills, diaphoresis, fatigue, fever and unexpected weight change     HENT: Negative for congestion, dental problem, ear discharge, ear pain, facial swelling, hearing loss, mouth sores, nosebleeds, postnasal drip, rhinorrhea, sinus pain, sinus pressure, sneezing, sore throat, trouble swallowing and voice change  Eyes: Negative for photophobia, pain, discharge, redness and itching  Respiratory: Negative for cough, chest tightness, shortness of breath and wheezing  Cardiovascular: Negative for chest pain, palpitations and leg swelling  Gastrointestinal: Negative for abdominal distention, abdominal pain, blood in stool, constipation, diarrhea and nausea  Endocrine: Negative for cold intolerance, heat intolerance, polydipsia, polyphagia and polyuria  Genitourinary: Negative for dysuria, flank pain, frequency, hematuria and urgency  Musculoskeletal: Negative for arthralgias, back pain, myalgias and neck pain  Skin: Negative for color change and pallor  Allergic/Immunologic: Negative for environmental allergies and food allergies  Neurological: Negative for dizziness, weakness, light-headedness, numbness and headaches  Hematological: Negative for adenopathy  Does not bruise/bleed easily  Psychiatric/Behavioral: Negative for behavioral problems, sleep disturbance and suicidal ideas  The patient is not nervous/anxious  Objective:  Physical Exam   Constitutional: She is oriented to person, place, and time  She appears well-developed and well-nourished  HENT:   Head: Normocephalic and atraumatic  Right Ear: External ear normal    Left Ear: External ear normal    Nose: Nose normal    Mouth/Throat: Oropharynx is clear and moist  No oropharyngeal exudate  Eyes: Conjunctivae and EOM are normal  Pupils are equal, round, and reactive to light  Right eye exhibits no discharge  Left eye exhibits no discharge  No scleral icterus  Neck: Normal range of motion  Neck supple  No tracheal deviation present  No thyromegaly present  Cardiovascular: Normal rate, regular rhythm and normal heart sounds      No murmur heard   Pulmonary/Chest: Effort normal and breath sounds normal  No respiratory distress  She has no wheezes  She has no rales  Abdominal: Soft  Bowel sounds are normal  She exhibits no distension and no mass  There is no tenderness  There is no rebound  Musculoskeletal: Normal range of motion  She exhibits no edema  Lymphadenopathy:     She has no cervical adenopathy  Neurological: She is alert and oriented to person, place, and time  She has normal reflexes  No cranial nerve deficit  Skin: Skin is warm  No rash noted  No erythema  No pallor  Psychiatric: She has a normal mood and affect  Her behavior is normal  Judgment and thought content normal    Nursing note and vitals reviewed           Past Surgical History:   Procedure Laterality Date    CARDIAC PACEMAKER PLACEMENT         Family History   Problem Relation Age of Onset    No Known Problems Mother          Current Outpatient Prescriptions:     ALPRAZolam (XANAX) 1 mg tablet, , Disp: , Rfl:     Aspirin Buf,CaCarb-MgCarb-MgO, (BUFFERED ASPIRIN) 325 MG TABS, Take by mouth daily, Disp: , Rfl:     atorvastatin (LIPITOR) 20 mg tablet, Take 1 tablet (20 mg total) by mouth daily, Disp: 90 tablet, Rfl: 0    carvedilol (COREG) 12 5 mg tablet, , Disp: , Rfl:     Cholecalciferol (VITAMIN D) 2000 units CAPS, Take by mouth, Disp: , Rfl:     escitalopram (LEXAPRO) 10 mg tablet, , Disp: , Rfl:     ferrous sulfate 325 (65 Fe) mg tablet, Take 1 tablet by mouth daily, Disp: , Rfl: 0    fluticasone (FLONASE) 50 mcg/act nasal spray, 2 sprays into each nostril daily, Disp: 16 g, Rfl: 1    levothyroxine 75 mcg tablet, Take 1 tablet by mouth, Disp: , Rfl:     omega-3-acid ethyl esters (LOVAZA) 1 g capsule, , Disp: , Rfl:     ramipril (ALTACE) 10 MG capsule, Take by mouth daily, Disp: , Rfl:     traZODone (DESYREL) 150 mg tablet, , Disp: , Rfl:     No Known Allergies    Vitals:    04/26/18 1305   BP: 120/72   Pulse: 81   Resp: 16   SpO2: 98%   Weight: 80 7 kg (178 lb)   Height: 5' (1 524 m)     Labs reviewed and they are stable, triglyceride improved

## 2018-05-01 ENCOUNTER — HOSPITAL ENCOUNTER (OUTPATIENT)
Dept: RADIOLOGY | Facility: HOSPITAL | Age: 68
Discharge: HOME | End: 2018-05-01
Attending: FAMILY MEDICINE
Payer: COMMERCIAL

## 2018-05-01 DIAGNOSIS — Z12.31 BREAST CANCER SCREENING BY MAMMOGRAM: ICD-10-CM

## 2018-05-01 PROCEDURE — 77067 SCR MAMMO BI INCL CAD: CPT

## 2018-05-23 ENCOUNTER — RX ONLY (RX ONLY)
Age: 68
End: 2018-05-23

## 2018-05-23 ENCOUNTER — DOCTOR'S OFFICE (OUTPATIENT)
Dept: URBAN - METROPOLITAN AREA CLINIC 137 | Facility: CLINIC | Age: 68
Setting detail: OPHTHALMOLOGY
End: 2018-05-23
Payer: COMMERCIAL

## 2018-05-23 DIAGNOSIS — H52.4: ICD-10-CM

## 2018-05-23 PROCEDURE — 92004 COMPRE OPH EXAM NEW PT 1/>: CPT | Performed by: OPHTHALMOLOGY

## 2018-05-23 ASSESSMENT — REFRACTION_OUTSIDERX
OS_ADD: +2.00
OD_VA2: 20/20(J1+)
OD_ADD: +2.00
OS_VA1: 20/20-1
OU_VA: 20/20
OD_CYLINDER: SPH
OS_SPHERE: +1.00
OS_VA2: 20/20(J1+)
OD_SPHERE: +0.50
OD_VA1: 20/20
OD_VA3: 20/
OS_VA3: 20/
OS_CYLINDER: SPH

## 2018-05-23 ASSESSMENT — REFRACTION_MANIFEST
OD_VA1: 20/
OD_VA1: 20/
OS_VA1: 20/
OS_VA2: 20/
OU_VA: 20/
OD_VA2: 20/
OS_VA3: 20/
OD_VA3: 20/
OS_VA1: 20/
OD_VA3: 20/
OS_VA3: 20/
OS_VA2: 20/
OU_VA: 20/
OD_VA2: 20/

## 2018-05-23 ASSESSMENT — CONFRONTATIONAL VISUAL FIELD TEST (CVF)
OS_FINDINGS: FULL
OD_FINDINGS: FULL

## 2018-05-23 ASSESSMENT — REFRACTION_CURRENTRX
OD_OVR_VA: 20/
OS_OVR_VA: 20/
OD_OVR_VA: 20/
OD_OVR_VA: 20/
OS_OVR_VA: 20/
OS_OVR_VA: 20/

## 2018-05-23 ASSESSMENT — VISUAL ACUITY
OD_BCVA: 20/40-2
OS_BCVA: 20/30-2

## 2018-05-23 ASSESSMENT — REFRACTION_AUTOREFRACTION
OS_CYLINDER: SPH
OD_CYLINDER: SPH
OD_SPHERE: +0.50
OS_SPHERE: +0.75

## 2018-07-05 DIAGNOSIS — D50.9 IRON DEFICIENCY ANEMIA, UNSPECIFIED IRON DEFICIENCY ANEMIA TYPE: Primary | ICD-10-CM

## 2018-07-05 RX ORDER — FERROUS SULFATE 325(65) MG
TABLET ORAL
Qty: 90 TABLET | Refills: 1 | Status: SHIPPED | OUTPATIENT
Start: 2018-07-05 | End: 2018-12-27 | Stop reason: SDUPTHER

## 2018-07-10 DIAGNOSIS — J30.1 SEASONAL ALLERGIC RHINITIS DUE TO POLLEN: ICD-10-CM

## 2018-07-10 RX ORDER — FLUTICASONE PROPIONATE 50 MCG
SPRAY, SUSPENSION (ML) NASAL
Qty: 16 G | Refills: 1 | Status: SHIPPED | OUTPATIENT
Start: 2018-07-10 | End: 2018-10-30 | Stop reason: SDUPTHER

## 2018-07-14 DIAGNOSIS — E03.8 OTHER SPECIFIED HYPOTHYROIDISM: ICD-10-CM

## 2018-07-16 RX ORDER — LEVOTHYROXINE SODIUM 0.07 MG/1
TABLET ORAL
Qty: 90 TABLET | Refills: 1 | Status: SHIPPED | OUTPATIENT
Start: 2018-07-16 | End: 2018-10-30

## 2018-07-31 DIAGNOSIS — E78.2 MIXED HYPERLIPIDEMIA: ICD-10-CM

## 2018-07-31 RX ORDER — ATORVASTATIN CALCIUM 20 MG/1
TABLET, FILM COATED ORAL
Qty: 90 TABLET | Refills: 0 | Status: SHIPPED | OUTPATIENT
Start: 2018-07-31 | End: 2018-11-01 | Stop reason: SDUPTHER

## 2018-10-26 LAB
ALBUMIN SERPL-MCNC: 4.1 G/DL (ref 3.6–5.1)
ALBUMIN/GLOB SERPL: 1.5 (CALC) (ref 1–2.5)
ALP SERPL-CCNC: 64 U/L (ref 33–130)
ALT SERPL-CCNC: 14 U/L (ref 6–29)
AST SERPL-CCNC: 17 U/L (ref 10–35)
BASOPHILS # BLD AUTO: 30 CELLS/UL (ref 0–200)
BASOPHILS NFR BLD AUTO: 0.5 %
BILIRUB SERPL-MCNC: 1 MG/DL (ref 0.2–1.2)
BUN SERPL-MCNC: 15 MG/DL (ref 7–25)
BUN/CREAT SERPL: NORMAL (CALC) (ref 6–22)
CALCIUM SERPL-MCNC: 9.3 MG/DL (ref 8.6–10.4)
CHLORIDE SERPL-SCNC: 105 MMOL/L (ref 98–110)
CHOLEST SERPL-MCNC: 145 MG/DL
CHOLEST/HDLC SERPL: 3.2 (CALC)
CO2 SERPL-SCNC: 30 MMOL/L (ref 20–32)
CREAT SERPL-MCNC: 0.95 MG/DL (ref 0.5–0.99)
EOSINOPHIL # BLD AUTO: 120 CELLS/UL (ref 15–500)
EOSINOPHIL NFR BLD AUTO: 2 %
ERYTHROCYTE [DISTWIDTH] IN BLOOD BY AUTOMATED COUNT: 12.8 % (ref 11–15)
GLOBULIN SER CALC-MCNC: 2.7 G/DL (CALC) (ref 1.9–3.7)
GLUCOSE SERPL-MCNC: 95 MG/DL (ref 65–99)
HCT VFR BLD AUTO: 44.8 % (ref 35–45)
HDLC SERPL-MCNC: 46 MG/DL
HGB BLD-MCNC: 14.6 G/DL (ref 11.7–15.5)
LDLC SERPL CALC-MCNC: 80 MG/DL (CALC)
LYMPHOCYTES # BLD AUTO: 2334 CELLS/UL (ref 850–3900)
LYMPHOCYTES NFR BLD AUTO: 38.9 %
MCH RBC QN AUTO: 28.1 PG (ref 27–33)
MCHC RBC AUTO-ENTMCNC: 32.6 G/DL (ref 32–36)
MCV RBC AUTO: 86.3 FL (ref 80–100)
MONOCYTES # BLD AUTO: 270 CELLS/UL (ref 200–950)
MONOCYTES NFR BLD AUTO: 4.5 %
NEUTROPHILS # BLD AUTO: 3246 CELLS/UL (ref 1500–7800)
NEUTROPHILS NFR BLD AUTO: 54.1 %
NONHDLC SERPL-MCNC: 99 MG/DL (CALC)
PLATELET # BLD AUTO: 209 THOUSAND/UL (ref 140–400)
PMV BLD REES-ECKER: 10.3 FL (ref 7.5–12.5)
POTASSIUM SERPL-SCNC: 4.3 MMOL/L (ref 3.5–5.3)
PROT SERPL-MCNC: 6.8 G/DL (ref 6.1–8.1)
RBC # BLD AUTO: 5.19 MILLION/UL (ref 3.8–5.1)
SL AMB EGFR AFRICAN AMERICAN: 72 ML/MIN/1.73M2
SL AMB EGFR NON AFRICAN AMERICAN: 62 ML/MIN/1.73M2
SODIUM SERPL-SCNC: 141 MMOL/L (ref 135–146)
TRIGL SERPL-MCNC: 108 MG/DL
TSH SERPL-ACNC: 2.11 MIU/L (ref 0.4–4.5)
WBC # BLD AUTO: 6 THOUSAND/UL (ref 3.8–10.8)

## 2018-10-30 ENCOUNTER — OFFICE VISIT (OUTPATIENT)
Dept: FAMILY MEDICINE CLINIC | Facility: CLINIC | Age: 68
End: 2018-10-30
Payer: COMMERCIAL

## 2018-10-30 VITALS
HEART RATE: 63 BPM | OXYGEN SATURATION: 96 % | BODY MASS INDEX: 33.77 KG/M2 | RESPIRATION RATE: 16 BRPM | HEIGHT: 60 IN | WEIGHT: 172 LBS | SYSTOLIC BLOOD PRESSURE: 120 MMHG | DIASTOLIC BLOOD PRESSURE: 70 MMHG

## 2018-10-30 DIAGNOSIS — I42.9 CARDIOMYOPATHY, UNSPECIFIED TYPE (HCC): ICD-10-CM

## 2018-10-30 DIAGNOSIS — J30.1 SEASONAL ALLERGIC RHINITIS DUE TO POLLEN: ICD-10-CM

## 2018-10-30 DIAGNOSIS — Z23 NEEDS FLU SHOT: ICD-10-CM

## 2018-10-30 DIAGNOSIS — E78.00 HYPERCHOLESTEROLEMIA: ICD-10-CM

## 2018-10-30 DIAGNOSIS — I10 BENIGN ESSENTIAL HYPERTENSION: ICD-10-CM

## 2018-10-30 DIAGNOSIS — F41.9 ANXIETY: ICD-10-CM

## 2018-10-30 DIAGNOSIS — D50.8 OTHER IRON DEFICIENCY ANEMIA: Primary | ICD-10-CM

## 2018-10-30 DIAGNOSIS — E03.9 HYPOTHYROIDISM, UNSPECIFIED TYPE: ICD-10-CM

## 2018-10-30 PROCEDURE — 1160F RVW MEDS BY RX/DR IN RCRD: CPT

## 2018-10-30 PROCEDURE — 99214 OFFICE O/P EST MOD 30 MIN: CPT | Performed by: FAMILY MEDICINE

## 2018-10-30 PROCEDURE — 1160F RVW MEDS BY RX/DR IN RCRD: CPT | Performed by: FAMILY MEDICINE

## 2018-10-30 PROCEDURE — 4040F PNEUMOC VAC/ADMIN/RCVD: CPT

## 2018-10-30 PROCEDURE — G0008 ADMIN INFLUENZA VIRUS VAC: HCPCS

## 2018-10-30 PROCEDURE — 1036F TOBACCO NON-USER: CPT | Performed by: FAMILY MEDICINE

## 2018-10-30 PROCEDURE — 3008F BODY MASS INDEX DOCD: CPT | Performed by: FAMILY MEDICINE

## 2018-10-30 PROCEDURE — 3078F DIAST BP <80 MM HG: CPT | Performed by: FAMILY MEDICINE

## 2018-10-30 PROCEDURE — 3074F SYST BP LT 130 MM HG: CPT | Performed by: FAMILY MEDICINE

## 2018-10-30 PROCEDURE — 90662 IIV NO PRSV INCREASED AG IM: CPT

## 2018-10-30 RX ORDER — FLUTICASONE PROPIONATE 50 MCG
SPRAY, SUSPENSION (ML) NASAL
Qty: 16 G | Refills: 3 | Status: SHIPPED | OUTPATIENT
Start: 2018-10-30 | End: 2019-10-30 | Stop reason: SDUPTHER

## 2018-10-30 NOTE — PROGRESS NOTES
Assessment/Plan:    Problem List Items Addressed This Visit        Endocrine    Hypothyroidism     Lab Results   Component Value Date    [id number] 3 10 11/02/2017    TSH 2 11 10/25/2018    She will continue same dose of levothyroxine and we check labs in 6 months           Relevant Orders    CBC and differential    Comprehensive metabolic panel    TSH, 3rd generation       Respiratory    Allergic rhinitis     Fluticasone nasal spray given         Relevant Medications    fluticasone (FLONASE) 50 mcg/act nasal spray       Cardiovascular and Mediastinum    Benign essential hypertension    Cardiomyopathy ([address] )     She is stable she will follow up with cardiologist regularly         Relevant Orders    Lipid panel       Other    Anxiety    Hypercholesterolemia      LDL is 80, continue statin         Iron deficiency anemia - Primary     Lab Results   Component Value Date    WBC 6 0 10/25/2018    HGB 14 6 10/25/2018    HCT 44 8 10/25/2018    MCV 86 3 10/25/2018     10/25/2018              Needs flu shot    Relevant Orders    influenza vaccine, [id number], high-dose, PF 0 5 mL, for patients 65 yr+ (FLUZONE HIGH-DOSE)          Chief Complaint   Patient presents with    Follow-up       Subjective:   Patient ID: Alex Lyons is a 80 y o  female  sHe is here for follow-up on hypertension, hypothyroid, she is taking all her medication, she has cardiomyopathy and she takes the statins for hyper lipidemia, she follows cardiologist Dr James Borjas and she says her ICD/pacemaker  is working fine,  Denies any headache chest pain shortness of breath and she has been using Flonase for her seasonal allergies and needs a refill,  She goes to psychiatrist and she takes her an Lexapro from that position  She has iron deficiency anemia which has been stable but taking iron 1 tablet daily which she has been taking for few years now and hemoglobin has been stable          Review of Systems   Constitutional: Negative for activity change, appetite change, chills, diaphoresis, fatigue, fever and unexpected weight change  HENT: Negative for congestion, dental problem, ear discharge, ear pain, facial swelling, hearing loss, mouth sores, nosebleeds, postnasal drip, rhinorrhea, sinus pain, sinus pressure, sneezing, sore throat, trouble swallowing and voice change  Eyes: Negative for photophobia, pain, discharge, redness and itching  Respiratory: Negative for cough, chest tightness, shortness of breath and wheezing  Cardiovascular: Negative for chest pain, palpitations and leg swelling  Gastrointestinal: Negative for abdominal distention, abdominal pain, blood in stool, constipation, diarrhea and nausea  Endocrine: Negative for cold intolerance, heat intolerance, polydipsia, polyphagia and polyuria  Genitourinary: Negative for dysuria, flank pain, frequency, hematuria and urgency  Musculoskeletal: Negative for arthralgias, back pain, myalgias and neck pain  Skin: Negative for color change and pallor  Allergic/Immunologic: Negative for environmental allergies and food allergies  Neurological: Negative for dizziness, weakness, light-headedness, numbness and headaches  Hematological: Negative for adenopathy  Does not bruise/bleed easily  Psychiatric/Behavioral: Negative for behavioral problems, sleep disturbance and suicidal ideas  The patient is not nervous/anxious  Objective:  Physical Exam   Constitutional: She is oriented to person, place, and time  She appears well-developed and well-nourished  HENT:   Head: Normocephalic and atraumatic  Nose: Nose normal    Mouth/Throat: Oropharynx is clear and moist  No oropharyngeal exudate  Eyes: Pupils are equal, round, and reactive to light  Conjunctivae and EOM are normal  Right eye exhibits no discharge  Left eye exhibits no discharge  No scleral icterus  Neck: Normal range of motion  Neck supple  No tracheal deviation present  No thyromegaly present  Cardiovascular: Normal rate, regular rhythm and normal heart sounds  No murmur heard  Pulmonary/Chest: Effort normal and breath sounds normal  No respiratory distress  She has no wheezes  She has no rales  Abdominal: Soft  Bowel sounds are normal  She exhibits no distension and no mass  There is no tenderness  There is no rebound  Musculoskeletal: Normal range of motion  She exhibits no edema  Lymphadenopathy:     She has no cervical adenopathy  Neurological: She is alert and oriented to person, place, and time  She has normal reflexes  No cranial nerve deficit  Skin: Skin is warm  No rash noted  No erythema  No pallor  Psychiatric: She has a normal mood and affect  Her behavior is normal  Judgment and thought content normal    Nursing note and vitals reviewed           Past Surgical History:   Procedure Laterality Date    CARDIAC PACEMAKER PLACEMENT         Family History   Problem Relation Age of Onset    No Known Problems Mother          Current Outpatient Prescriptions:     ALPRAZolam (XANAX) 1 mg tablet, , Disp: , Rfl:     Aspirin Buf,CaCarb-MgCarb-MgO, (BUFFERED ASPIRIN) 325 MG TABS, Take by mouth daily, Disp: , Rfl:     atorvastatin (LIPITOR) 20 mg tablet, take 1 tablet by mouth once daily, Disp: 90 tablet, Rfl: 0    carvedilol (COREG) 12 5 mg tablet, , Disp: , Rfl:     Cholecalciferol (VITAMIN D) 2000 units CAPS, Take by mouth, Disp: , Rfl:     escitalopram (LEXAPRO) 10 mg tablet, 20 mg  , Disp: , Rfl:     ferrous sulfate 325 (65 Fe) mg tablet, take 1 tablet by mouth once daily, Disp: 90 tablet, Rfl: 1    fluticasone (FLONASE) 50 mcg/act nasal spray, Used 2 spray each nostril once a day, Disp: 16 g, Rfl: 3    levothyroxine 75 mcg tablet, Take 1 tablet by mouth, Disp: , Rfl:     omega-3-acid ethyl esters (LOVAZA) 1 g capsule, , Disp: , Rfl:     ramipril (ALTACE) 10 MG capsule, Take by mouth daily, Disp: , Rfl:     traZODone (DESYREL) 150 mg tablet, , Disp: , Rfl:     No Known Allergies    Vitals:    10/30/18 1648   BP: 120/70   Pulse: 63   Resp: 16   SpO2: 96%   Weight: 78 kg (172 lb)   Height: 5' (1 524 m)

## 2018-10-30 NOTE — ASSESSMENT & PLAN NOTE
Lab Results   Component Value Date    [id number] 3 10 11/02/2017    TSH 2 11 10/25/2018    She will continue same dose of levothyroxine and we check labs in 6 months

## 2018-10-30 NOTE — ASSESSMENT & PLAN NOTE
Lab Results   Component Value Date    WBC 6 0 10/25/2018    HGB 14 6 10/25/2018    HCT 44 8 10/25/2018    MCV 86 3 10/25/2018     10/25/2018

## 2018-10-31 ENCOUNTER — TELEPHONE (OUTPATIENT)
Dept: FAMILY MEDICINE CLINIC | Facility: CLINIC | Age: 68
End: 2018-10-31

## 2018-10-31 DIAGNOSIS — E78.2 MIXED HYPERLIPIDEMIA: ICD-10-CM

## 2018-10-31 RX ORDER — ATORVASTATIN CALCIUM 20 MG/1
20 TABLET, FILM COATED ORAL DAILY
Qty: 90 TABLET | Refills: 0 | Status: CANCELLED | OUTPATIENT
Start: 2018-10-31

## 2018-10-31 NOTE — TELEPHONE ENCOUNTER
PATIENT CAME TO THE WINDOW TO TELL US THAT SHE NEEDS A REFILL FOR HER ATORVASTATIN 20MG  SHE HAS ONE PILL LEFT  SHE USES [facility] IN [place]

## 2018-11-01 DIAGNOSIS — E78.2 MIXED HYPERLIPIDEMIA: ICD-10-CM

## 2018-11-01 RX ORDER — ATORVASTATIN CALCIUM 20 MG/1
20 TABLET, FILM COATED ORAL DAILY
Qty: 90 TABLET | Refills: 1 | Status: SHIPPED | OUTPATIENT
Start: 2018-11-01 | End: 2019-04-21 | Stop reason: SDUPTHER

## 2018-12-27 DIAGNOSIS — D50.9 IRON DEFICIENCY ANEMIA, UNSPECIFIED IRON DEFICIENCY ANEMIA TYPE: ICD-10-CM

## 2018-12-27 RX ORDER — FERROUS SULFATE 325(65) MG
TABLET ORAL
Qty: 90 TABLET | Refills: 1 | Status: SHIPPED | OUTPATIENT
Start: 2018-12-27 | End: 2019-06-21 | Stop reason: SDUPTHER

## 2018-12-27 NOTE — TELEPHONE ENCOUNTER
Patient last seen in October and typically follows up every 6 months  Patient would not be due until April

## 2019-01-09 DIAGNOSIS — E03.8 OTHER SPECIFIED HYPOTHYROIDISM: ICD-10-CM

## 2019-01-09 RX ORDER — LEVOTHYROXINE SODIUM 0.07 MG/1
TABLET ORAL
Qty: 90 TABLET | Refills: 1 | Status: SHIPPED | OUTPATIENT
Start: 2019-01-09 | End: 2019-07-09 | Stop reason: SDUPTHER

## 2019-02-13 ENCOUNTER — OFFICE VISIT (OUTPATIENT)
Dept: FAMILY MEDICINE CLINIC | Facility: CLINIC | Age: 69
End: 2019-02-13
Payer: COMMERCIAL

## 2019-02-13 VITALS
WEIGHT: 172.6 LBS | RESPIRATION RATE: 18 BRPM | DIASTOLIC BLOOD PRESSURE: 78 MMHG | BODY MASS INDEX: 33.89 KG/M2 | TEMPERATURE: 98.2 F | SYSTOLIC BLOOD PRESSURE: 122 MMHG | HEIGHT: 60 IN | HEART RATE: 72 BPM | OXYGEN SATURATION: 98 %

## 2019-02-13 DIAGNOSIS — J20.9 ACUTE BRONCHITIS, UNSPECIFIED ORGANISM: Primary | ICD-10-CM

## 2019-02-13 PROCEDURE — 1036F TOBACCO NON-USER: CPT | Performed by: NURSE PRACTITIONER

## 2019-02-13 PROCEDURE — 1101F PT FALLS ASSESS-DOCD LE1/YR: CPT | Performed by: NURSE PRACTITIONER

## 2019-02-13 PROCEDURE — 3725F SCREEN DEPRESSION PERFORMED: CPT | Performed by: NURSE PRACTITIONER

## 2019-02-13 PROCEDURE — 99214 OFFICE O/P EST MOD 30 MIN: CPT | Performed by: NURSE PRACTITIONER

## 2019-02-13 RX ORDER — ESCITALOPRAM OXALATE 20 MG/1
1 TABLET ORAL DAILY
Refills: 0 | COMMUNITY
Start: 2018-12-31

## 2019-02-13 RX ORDER — CEFDINIR 300 MG/1
300 CAPSULE ORAL EVERY 12 HOURS SCHEDULED
Qty: 14 CAPSULE | Refills: 0 | Status: SHIPPED | OUTPATIENT
Start: 2019-02-13 | End: 2019-02-20

## 2019-02-13 NOTE — PROGRESS NOTES
Assessment/Plan:      Diagnoses and all orders for this visit:    Acute bronchitis, unspecified organism  -     cefdinir (OMNICEF) 300 mg capsule; Take 1 capsule (300 mg total) by mouth every 12 (twelve) hours for 7 days    Discussion on acute bronchitis and treatment  Cefdinir prescribed  Medication information and side effects reviewed  Patient instructed to drink plenty of fluids  Patient instructed to follow-up if symptoms get worse or do not get better  Subjective:     Patient ID: Alex Lyons is a 80 y o  female  Patient reports a dry cough for the past 5 days  Patient reports that her chest muscles are sore from coughing  Denies any fever, wheezing, or SOB  Patient reports alittle nasal congestion  Patient also reports occasional slight Has and right earache  Denies any sore throat, vomiting, or diarrhea  Patient reports that she tried cough medication OTC  Patient reports that her symptoms are not going away  Review of Systems   Constitutional: Positive for fatigue  Negative for chills and fever  HENT:        As noted in HPI  Eyes: Negative for pain, discharge and redness  Respiratory:        As noted in HPI  Cardiovascular: Negative for chest pain and palpitations  Gastrointestinal: Negative for abdominal pain, diarrhea, nausea and vomiting  Genitourinary: Negative for dysuria, frequency and hematuria  Musculoskeletal:        As noted in HPI  Skin: Negative for rash  Neurological: Positive for headaches  Negative for dizziness, seizures and syncope  Objective:     Physical Exam   Constitutional: She is oriented to person, place, and time  No distress  Patient appears tired  HENT:   Right Ear: External ear normal    Left Ear: External ear normal    Mouth/Throat: Oropharynx is clear and moist    Tympanic membranes and ear canals wnl  Yellow nasal discharge noted  Eyes: Pupils are equal, round, and reactive to light   Conjunctivae are normal  Right eye exhibits no discharge  Left eye exhibits no discharge  Cardiovascular: Normal rate, regular rhythm and normal heart sounds  No edema noted  Pulmonary/Chest: Effort normal  No respiratory distress  She has no wheezes  Dry cough noted  No crackles noted on auscultation  Coarse lung sounds noted  Musculoskeletal:   Gait wnl  Lymphadenopathy:     She has no cervical adenopathy  Neurological: She is alert and oriented to person, place, and time  Skin: No rash noted  Psychiatric: She has a normal mood and affect  Vitals reviewed

## 2019-02-18 ENCOUNTER — OFFICE VISIT (OUTPATIENT)
Dept: FAMILY MEDICINE CLINIC | Facility: CLINIC | Age: 69
End: 2019-02-18
Payer: COMMERCIAL

## 2019-02-18 VITALS
SYSTOLIC BLOOD PRESSURE: 140 MMHG | RESPIRATION RATE: 16 BRPM | BODY MASS INDEX: 33.18 KG/M2 | TEMPERATURE: 99.4 F | OXYGEN SATURATION: 95 % | WEIGHT: 169 LBS | HEART RATE: 93 BPM | DIASTOLIC BLOOD PRESSURE: 90 MMHG | HEIGHT: 60 IN

## 2019-02-18 DIAGNOSIS — I10 BENIGN ESSENTIAL HYPERTENSION: ICD-10-CM

## 2019-02-18 DIAGNOSIS — E03.9 HYPOTHYROIDISM, UNSPECIFIED TYPE: ICD-10-CM

## 2019-02-18 DIAGNOSIS — J20.9 ACUTE BRONCHITIS, UNSPECIFIED ORGANISM: Primary | ICD-10-CM

## 2019-02-18 PROCEDURE — 3008F BODY MASS INDEX DOCD: CPT | Performed by: FAMILY MEDICINE

## 2019-02-18 PROCEDURE — 1160F RVW MEDS BY RX/DR IN RCRD: CPT | Performed by: FAMILY MEDICINE

## 2019-02-18 PROCEDURE — 99213 OFFICE O/P EST LOW 20 MIN: CPT | Performed by: FAMILY MEDICINE

## 2019-02-18 NOTE — PROGRESS NOTES
Assessment/Plan:    Problem List Items Addressed This Visit        Respiratory    Acute bronchitis - Primary      She will finish the antibiotic, and advised to drink more fluids get reds injury L and soft diet so she can tolerate better    Chief Complaint   Patient presents with    Follow-up     from [facility] ER       Subjective:   Patient ID: Guanako Grant is a 80 y o  female  Patient with PMH of Hypothyroid, HTN, hypercholesterolemia, cardiomyopathy, and anxiety, coming in presenting for follow up sp ER visit on 02/15 for URI  Patient was seen at our office on 02/13 for a yellow-productive cough and congestion  She was prescribed Cefdinir 300mg capsules PO BID x7days  She states 2 days later she went to [facility] ER because her cough and symptoms were not improving  At the ER, they performed a CXR and influenza swab, which both came back negative for infection  ER treated her with breathing treatment, and discharged her with diagnosis of URI  Today patient states her cough is still present but less than before  She is no longer having productive yellow sputum  Patient states the cough is worse in the morning as she feels all the mucus build up in her throat  She is still experiencing clear rhinorrhea and some headaches  Patient states she still has decreased apetite  She states she still feels weak and has been nauseous  Due to her nausea, patient has not taken all of her regular medications  She states she has missed her Levothyroxine and Lipitor for one week, but has been taking her BP medications and Cefdinir  Other than the antibiotic, she has only taken ibuprofen for her symptoms  Has not taken any other OTC medications  Denies fevers, vomiting, diarrhea  Denies history of smoking, asthma, or COPD  Had the flu vaccine this year  Review of Systems   Constitutional: Negative for activity change, appetite change, chills, diaphoresis, fatigue, fever and unexpected weight change     HENT: Positive for rhinorrhea  Negative for congestion, dental problem, ear discharge, ear pain, facial swelling, hearing loss, mouth sores, nosebleeds, postnasal drip, sinus pressure, sinus pain, sneezing, sore throat, trouble swallowing and voice change  Eyes: Negative for photophobia, pain, discharge, redness and itching  Respiratory: Positive for cough  Negative for chest tightness, shortness of breath and wheezing  Cardiovascular: Negative for chest pain, palpitations and leg swelling  Gastrointestinal: Negative for abdominal distention, abdominal pain, blood in stool, constipation, diarrhea and nausea  Endocrine: Negative for cold intolerance, heat intolerance, polydipsia, polyphagia and polyuria  Genitourinary: Negative for dysuria, flank pain, frequency, hematuria and urgency  Musculoskeletal: Negative for arthralgias, back pain, myalgias and neck pain  Skin: Negative for color change and pallor  Allergic/Immunologic: Negative for environmental allergies and food allergies  Neurological: Negative for dizziness, weakness, light-headedness, numbness and headaches  Hematological: Negative for adenopathy  Does not bruise/bleed easily  Psychiatric/Behavioral: Negative for behavioral problems, sleep disturbance and suicidal ideas  The patient is not nervous/anxious  Objective:  Physical Exam   Constitutional: She appears well-developed and well-nourished  HENT:   Head: Normocephalic and atraumatic  Right Ear: External ear normal    Left Ear: External ear normal    Mouth/Throat: Oropharynx is clear and moist    Eyes: Pupils are equal, round, and reactive to light  Conjunctivae and EOM are normal  No scleral icterus  Neck: Normal range of motion  Neck supple  No thyromegaly present  Cardiovascular: Normal rate, regular rhythm and normal heart sounds  Pulmonary/Chest: Effort normal and breath sounds normal  She has no wheezes  She has no rales     Lymphadenopathy:     She has no cervical adenopathy  Neurological: She is alert  Skin: No rash noted  No erythema  Psychiatric: She has a normal mood and affect  Vitals reviewed           Past Surgical History:   Procedure Laterality Date    CARDIAC PACEMAKER PLACEMENT         Family History   Problem Relation Age of Onset    No Known Problems Mother          Current Outpatient Medications:     ALPRAZolam (XANAX) 1 mg tablet, , Disp: , Rfl:     Aspirin Buf,CaCarb-MgCarb-MgO, (BUFFERED ASPIRIN) 325 MG TABS, Take by mouth daily, Disp: , Rfl:     atorvastatin (LIPITOR) 20 mg tablet, Take 1 tablet (20 mg total) by mouth daily, Disp: 90 tablet, Rfl: 1    carvedilol (COREG) 12 5 mg tablet, , Disp: , Rfl:     cefdinir (OMNICEF) 300 mg capsule, Take 1 capsule (300 mg total) by mouth every 12 (twelve) hours for 7 days, Disp: 14 capsule, Rfl: 0    Cholecalciferol (VITAMIN D) 2000 units CAPS, Take by mouth, Disp: , Rfl:     escitalopram (LEXAPRO) 20 mg tablet, Take 1 tablet by mouth daily, Disp: , Rfl: 0    FEROSUL 325 (65 Fe) MG tablet, take 1 tablet by mouth daily, Disp: 90 tablet, Rfl: 1    fluticasone (FLONASE) 50 mcg/act nasal spray, Used 2 spray each nostril once a day, Disp: 16 g, Rfl: 3    levothyroxine 75 mcg tablet, take 1 tablet by mouth every morning, Disp: 90 tablet, Rfl: 1    omega-3-acid ethyl esters (LOVAZA) 1 g capsule, , Disp: , Rfl:     ramipril (ALTACE) 10 MG capsule, Take by mouth daily, Disp: , Rfl:     traZODone (DESYREL) 150 mg tablet, Take 150 mg by mouth daily , Disp: , Rfl:     No Known Allergies    Vitals:    02/18/19 1338   BP: 140/90   Pulse: 93   Resp: 16   Temp: 99 4 °F (37 4 °C)   SpO2: 95%   Weight: 76 7 kg (169 lb)   Height: 5' (1 524 m)

## 2019-02-18 NOTE — ASSESSMENT & PLAN NOTE
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

## 2019-04-21 DIAGNOSIS — E78.2 MIXED HYPERLIPIDEMIA: ICD-10-CM

## 2019-04-22 RX ORDER — ATORVASTATIN CALCIUM 20 MG/1
TABLET, FILM COATED ORAL
Qty: 90 TABLET | Refills: 1 | Status: SHIPPED | OUTPATIENT
Start: 2019-04-22 | End: 2019-10-18 | Stop reason: SDUPTHER

## 2019-05-01 LAB
ALBUMIN SERPL-MCNC: 3.8 G/DL (ref 3.6–5.1)
ALBUMIN/GLOB SERPL: 1.4 (CALC) (ref 1–2.5)
ALP SERPL-CCNC: 66 U/L (ref 33–130)
ALT SERPL-CCNC: 11 U/L (ref 6–29)
AST SERPL-CCNC: 14 U/L (ref 10–35)
BASOPHILS # BLD AUTO: 28 CELLS/UL (ref 0–200)
BASOPHILS NFR BLD AUTO: 0.4 %
BILIRUB SERPL-MCNC: 0.8 MG/DL (ref 0.2–1.2)
BUN SERPL-MCNC: 16 MG/DL (ref 7–25)
BUN/CREAT SERPL: NORMAL (CALC) (ref 6–22)
CALCIUM SERPL-MCNC: 8.9 MG/DL (ref 8.6–10.4)
CHLORIDE SERPL-SCNC: 106 MMOL/L (ref 98–110)
CHOLEST SERPL-MCNC: 136 MG/DL
CHOLEST/HDLC SERPL: 3.2 (CALC)
CO2 SERPL-SCNC: 28 MMOL/L (ref 20–32)
CREAT SERPL-MCNC: 0.88 MG/DL (ref 0.5–0.99)
EOSINOPHIL # BLD AUTO: 189 CELLS/UL (ref 15–500)
EOSINOPHIL NFR BLD AUTO: 2.7 %
ERYTHROCYTE [DISTWIDTH] IN BLOOD BY AUTOMATED COUNT: 12.8 % (ref 11–15)
GLOBULIN SER CALC-MCNC: 2.7 G/DL (CALC) (ref 1.9–3.7)
GLUCOSE SERPL-MCNC: 86 MG/DL (ref 65–99)
HCT VFR BLD AUTO: 42.5 % (ref 35–45)
HDLC SERPL-MCNC: 43 MG/DL
HGB BLD-MCNC: 13.8 G/DL (ref 11.7–15.5)
LDLC SERPL CALC-MCNC: 74 MG/DL (CALC)
LYMPHOCYTES # BLD AUTO: 2338 CELLS/UL (ref 850–3900)
LYMPHOCYTES NFR BLD AUTO: 33.4 %
MCH RBC QN AUTO: 28.4 PG (ref 27–33)
MCHC RBC AUTO-ENTMCNC: 32.5 G/DL (ref 32–36)
MCV RBC AUTO: 87.4 FL (ref 80–100)
MONOCYTES # BLD AUTO: 350 CELLS/UL (ref 200–950)
MONOCYTES NFR BLD AUTO: 5 %
NEUTROPHILS # BLD AUTO: 4095 CELLS/UL (ref 1500–7800)
NEUTROPHILS NFR BLD AUTO: 58.5 %
NONHDLC SERPL-MCNC: 93 MG/DL (CALC)
PLATELET # BLD AUTO: 242 THOUSAND/UL (ref 140–400)
PMV BLD REES-ECKER: 10.5 FL (ref 7.5–12.5)
POTASSIUM SERPL-SCNC: 4.1 MMOL/L (ref 3.5–5.3)
PROT SERPL-MCNC: 6.5 G/DL (ref 6.1–8.1)
RBC # BLD AUTO: 4.86 MILLION/UL (ref 3.8–5.1)
SL AMB EGFR AFRICAN AMERICAN: 79 ML/MIN/1.73M2
SL AMB EGFR NON AFRICAN AMERICAN: 68 ML/MIN/1.73M2
SODIUM SERPL-SCNC: 141 MMOL/L (ref 135–146)
TRIGL SERPL-MCNC: 104 MG/DL
TSH SERPL-ACNC: 0.78 MIU/L (ref 0.4–4.5)
WBC # BLD AUTO: 7 THOUSAND/UL (ref 3.8–10.8)

## 2019-06-17 ENCOUNTER — TELEPHONE (OUTPATIENT)
Dept: FAMILY MEDICINE CLINIC | Facility: CLINIC | Age: 69
End: 2019-06-17

## 2019-06-17 DIAGNOSIS — Z12.31 BREAST CANCER SCREENING BY MAMMOGRAM: Primary | ICD-10-CM

## 2019-06-21 DIAGNOSIS — D50.9 IRON DEFICIENCY ANEMIA, UNSPECIFIED IRON DEFICIENCY ANEMIA TYPE: ICD-10-CM

## 2019-06-21 RX ORDER — PNV NO.95/FERROUS FUM/FOLIC AC 28MG-0.8MG
TABLET ORAL
Qty: 90 TABLET | Refills: 1 | Status: SHIPPED | OUTPATIENT
Start: 2019-06-21 | End: 2019-12-13 | Stop reason: SDUPTHER

## 2019-07-02 ENCOUNTER — HOSPITAL ENCOUNTER (OUTPATIENT)
Dept: RADIOLOGY | Facility: HOSPITAL | Age: 69
Discharge: HOME | End: 2019-07-02
Attending: FAMILY MEDICINE
Payer: COMMERCIAL

## 2019-07-02 VITALS — WEIGHT: 166 LBS | BODY MASS INDEX: 32.59 KG/M2 | HEIGHT: 60 IN

## 2019-07-02 DIAGNOSIS — Z12.31 BREAST CANCER SCREENING BY MAMMOGRAM: ICD-10-CM

## 2019-07-02 PROCEDURE — 77063 BREAST TOMOSYNTHESIS BI: CPT

## 2019-07-02 PROCEDURE — 77067 SCR MAMMO BI INCL CAD: CPT

## 2019-07-09 DIAGNOSIS — E03.8 OTHER SPECIFIED HYPOTHYROIDISM: ICD-10-CM

## 2019-07-09 RX ORDER — LEVOTHYROXINE SODIUM 0.07 MG/1
TABLET ORAL
Qty: 90 TABLET | Refills: 1 | Status: SHIPPED | OUTPATIENT
Start: 2019-07-09 | End: 2019-10-30 | Stop reason: SDUPTHER

## 2019-10-08 ENCOUNTER — OFFICE VISIT (OUTPATIENT)
Dept: FAMILY MEDICINE CLINIC | Facility: CLINIC | Age: 69
End: 2019-10-08
Payer: COMMERCIAL

## 2019-10-08 VITALS
TEMPERATURE: 97.8 F | RESPIRATION RATE: 16 BRPM | SYSTOLIC BLOOD PRESSURE: 130 MMHG | OXYGEN SATURATION: 96 % | DIASTOLIC BLOOD PRESSURE: 80 MMHG | WEIGHT: 172 LBS | BODY MASS INDEX: 33.77 KG/M2 | HEIGHT: 60 IN | HEART RATE: 61 BPM

## 2019-10-08 DIAGNOSIS — J20.9 ACUTE BRONCHITIS, UNSPECIFIED ORGANISM: Primary | ICD-10-CM

## 2019-10-08 DIAGNOSIS — Z11.59 NEED FOR HEPATITIS C SCREENING TEST: ICD-10-CM

## 2019-10-08 DIAGNOSIS — E78.00 HYPERCHOLESTEROLEMIA: ICD-10-CM

## 2019-10-08 DIAGNOSIS — E03.9 HYPOTHYROIDISM, UNSPECIFIED TYPE: ICD-10-CM

## 2019-10-08 DIAGNOSIS — I10 BENIGN ESSENTIAL HYPERTENSION: ICD-10-CM

## 2019-10-08 PROCEDURE — 99214 OFFICE O/P EST MOD 30 MIN: CPT | Performed by: FAMILY MEDICINE

## 2019-10-08 RX ORDER — ALBUTEROL SULFATE 90 UG/1
2 AEROSOL, METERED RESPIRATORY (INHALATION) EVERY 6 HOURS PRN
Qty: 1 INHALER | Refills: 0 | Status: SHIPPED | OUTPATIENT
Start: 2019-10-08 | End: 2019-10-30 | Stop reason: SDUPTHER

## 2019-10-08 RX ORDER — AMOXICILLIN AND CLAVULANATE POTASSIUM 875; 125 MG/1; MG/1
1 TABLET, FILM COATED ORAL EVERY 12 HOURS SCHEDULED
Qty: 14 TABLET | Refills: 0 | Status: SHIPPED | OUTPATIENT
Start: 2019-10-08 | End: 2019-10-15

## 2019-10-08 NOTE — ASSESSMENT & PLAN NOTE
She has cough and wheezing, with her risk factors of her disease, will start her on antibiotic and inhaler and follow-up if not better  Advised to have labs and then have annual wellness visit

## 2019-10-08 NOTE — PROGRESS NOTES
Assessment/Plan:    Problem List Items Addressed This Visit        Endocrine    Hypothyroidism     Advised to have labs done and follow up         Relevant Orders    Hemoglobin A1C    TSH, 3rd generation       Respiratory    Acute bronchitis - Primary     She has cough and wheezing, with her risk factors of her disease, will start her on antibiotic and inhaler and follow-up if not better  Advised to have labs and then have annual wellness visit         Relevant Medications    amoxicillin-clavulanate (AUGMENTIN) 875-125 mg per tablet    albuterol (VENTOLIN HFA) 90 mcg/act inhaler       Cardiovascular and Mediastinum    Benign essential hypertension    Relevant Orders    CBC and differential    Comprehensive metabolic panel    Hemoglobin A1C       Other    Hypercholesterolemia    Relevant Orders    Hemoglobin A1C    Lipid panel    Need for hepatitis C screening test          Chief Complaint   Patient presents with    Cold Like Symptoms       Subjective:   Patient ID: Arianne Herrera is a 77 y o  female  She complains of having congestion in her chest for last 5 days he started with headache and now she feels itching in the throat and wheezing in the chest, she says she can't take deep breaths, she denies any fever but she feels hot and cold, denies any nausea vomiting,  She has cardiomyopathy and she has been following cardiologist, she has hypothyroid and she is on levothyroxine, she is due for her labs      Review of Systems   Constitutional: Negative for activity change, appetite change, chills, diaphoresis, fatigue, fever and unexpected weight change  HENT: Negative for congestion, dental problem, ear discharge, ear pain, facial swelling, hearing loss, mouth sores, nosebleeds, postnasal drip, rhinorrhea, sinus pressure, sinus pain, sneezing, sore throat, trouble swallowing and voice change  Eyes: Negative for photophobia, pain, discharge, redness and itching     Respiratory: Negative for cough, chest tightness, shortness of breath and wheezing  Cardiovascular: Negative for chest pain, palpitations and leg swelling  Gastrointestinal: Negative for abdominal distention, abdominal pain, blood in stool, constipation, diarrhea and nausea  Endocrine: Negative for cold intolerance, heat intolerance, polydipsia, polyphagia and polyuria  Genitourinary: Negative for dysuria, flank pain, frequency, hematuria and urgency  Musculoskeletal: Negative for arthralgias, back pain, myalgias and neck pain  Skin: Negative for color change and pallor  Allergic/Immunologic: Negative for environmental allergies and food allergies  Neurological: Negative for dizziness, weakness, light-headedness, numbness and headaches  Hematological: Negative for adenopathy  Does not bruise/bleed easily  Psychiatric/Behavioral: Negative for behavioral problems, sleep disturbance and suicidal ideas  The patient is not nervous/anxious  BMI Counseling: Body mass index is 33 59 kg/m²  The BMI is above normal  Nutrition recommendations include reducing portion sizes and reducing intake of cholesterol  Objective:  Physical Exam   Constitutional: She is oriented to person, place, and time  She appears well-developed and well-nourished  HENT:   Head: Normocephalic and atraumatic  Right Ear: External ear normal    Left Ear: External ear normal    Nose: Nose normal    Mouth/Throat: Oropharynx is clear and moist  No oropharyngeal exudate  Eyes: Pupils are equal, round, and reactive to light  Conjunctivae and EOM are normal  Right eye exhibits no discharge  Left eye exhibits no discharge  No scleral icterus  Neck: Normal range of motion  Neck supple  No tracheal deviation present  No thyromegaly present  Cardiovascular: Normal rate, regular rhythm and normal heart sounds  No murmur heard  Pulmonary/Chest: Effort normal  No respiratory distress  She has wheezes  She has no rales  Abdominal: Soft   Bowel sounds are normal  She exhibits no distension and no mass  There is no tenderness  There is no rebound  Musculoskeletal: Normal range of motion  She exhibits no edema  Lymphadenopathy:     She has no cervical adenopathy  Neurological: She is alert and oriented to person, place, and time  She has normal reflexes  No cranial nerve deficit  Skin: Skin is warm  No rash noted  No erythema  No pallor  Psychiatric: She has a normal mood and affect  Her behavior is normal  Judgment and thought content normal    Nursing note and vitals reviewed           Past Surgical History:   Procedure Laterality Date    CARDIAC PACEMAKER PLACEMENT         Family History   Adopted: Yes   Problem Relation Age of Onset    No Known Problems Mother     Thyroid cancer Sister          Current Outpatient Medications:     ALPRAZolam (XANAX) 1 mg tablet, , Disp: , Rfl:     Aspirin Buf,CaCarb-MgCarb-MgO, (BUFFERED ASPIRIN) 325 MG TABS, Take by mouth daily, Disp: , Rfl:     atorvastatin (LIPITOR) 20 mg tablet, take 1 tablet by mouth once daily, Disp: 90 tablet, Rfl: 1    carvedilol (COREG) 12 5 mg tablet, , Disp: , Rfl:     Cholecalciferol (VITAMIN D) 2000 units CAPS, Take by mouth, Disp: , Rfl:     escitalopram (LEXAPRO) 20 mg tablet, Take 1 tablet by mouth daily, Disp: , Rfl: 0    Ferrous Sulfate (IRON) 325 (65 Fe) MG TABS, take 1 tablet by mouth once daily, Disp: 90 tablet, Rfl: 1    fluticasone (FLONASE) 50 mcg/act nasal spray, Used 2 spray each nostril once a day, Disp: 16 g, Rfl: 3    levothyroxine 75 mcg tablet, take 1 tablet by mouth every morning, Disp: 90 tablet, Rfl: 1    omega-3-acid ethyl esters (LOVAZA) 1 g capsule, , Disp: , Rfl:     ramipril (ALTACE) 10 MG capsule, Take by mouth daily, Disp: , Rfl:     traZODone (DESYREL) 150 mg tablet, Take 150 mg by mouth daily , Disp: , Rfl:     albuterol (VENTOLIN HFA) 90 mcg/act inhaler, Inhale 2 puffs every 6 (six) hours as needed for wheezing, Disp: 1 Inhaler, Rfl: 0   amoxicillin-clavulanate (AUGMENTIN) 875-125 mg per tablet, Take 1 tablet by mouth every 12 (twelve) hours for 7 days, Disp: 14 tablet, Rfl: 0    No Known Allergies    Vitals:    10/08/19 1523 10/08/19 1557   BP: 130/80    BP Location: Right arm    Patient Position: Sitting    Cuff Size: Large    Pulse: 61    Resp:  16   Temp: 97 8 °F (36 6 °C)    SpO2: 96%    Weight: 78 kg (172 lb)    Height: 5' (1 524 m)

## 2019-10-18 DIAGNOSIS — E78.2 MIXED HYPERLIPIDEMIA: ICD-10-CM

## 2019-10-20 RX ORDER — ATORVASTATIN CALCIUM 20 MG/1
TABLET, FILM COATED ORAL
Qty: 90 TABLET | Refills: 1 | Status: SHIPPED | OUTPATIENT
Start: 2019-10-20 | End: 2020-04-15

## 2019-10-24 LAB
ALBUMIN SERPL-MCNC: 4.1 G/DL (ref 3.6–5.1)
ALBUMIN/GLOB SERPL: 1.6 (CALC) (ref 1–2.5)
ALP SERPL-CCNC: 63 U/L (ref 33–130)
ALT SERPL-CCNC: 13 U/L (ref 6–29)
AST SERPL-CCNC: 15 U/L (ref 10–35)
BASOPHILS # BLD AUTO: 52 CELLS/UL (ref 0–200)
BASOPHILS NFR BLD AUTO: 0.9 %
BILIRUB SERPL-MCNC: 1 MG/DL (ref 0.2–1.2)
BUN SERPL-MCNC: 16 MG/DL (ref 7–25)
BUN/CREAT SERPL: ABNORMAL (CALC) (ref 6–22)
CALCIUM SERPL-MCNC: 9.5 MG/DL (ref 8.6–10.4)
CHLORIDE SERPL-SCNC: 105 MMOL/L (ref 98–110)
CHOLEST SERPL-MCNC: 132 MG/DL
CHOLEST/HDLC SERPL: 2.9 (CALC)
CO2 SERPL-SCNC: 30 MMOL/L (ref 20–32)
CREAT SERPL-MCNC: 0.98 MG/DL (ref 0.5–0.99)
EOSINOPHIL # BLD AUTO: 122 CELLS/UL (ref 15–500)
EOSINOPHIL NFR BLD AUTO: 2.1 %
ERYTHROCYTE [DISTWIDTH] IN BLOOD BY AUTOMATED COUNT: 12.8 % (ref 11–15)
GLOBULIN SER CALC-MCNC: 2.6 G/DL (CALC) (ref 1.9–3.7)
GLUCOSE SERPL-MCNC: 93 MG/DL (ref 65–99)
HBA1C MFR BLD: 5.5 % OF TOTAL HGB
HCT VFR BLD AUTO: 42.8 % (ref 35–45)
HDLC SERPL-MCNC: 46 MG/DL
HGB BLD-MCNC: 14.1 G/DL (ref 11.7–15.5)
LDLC SERPL CALC-MCNC: 70 MG/DL (CALC)
LYMPHOCYTES # BLD AUTO: 2459 CELLS/UL (ref 850–3900)
LYMPHOCYTES NFR BLD AUTO: 42.4 %
MCH RBC QN AUTO: 27.9 PG (ref 27–33)
MCHC RBC AUTO-ENTMCNC: 32.9 G/DL (ref 32–36)
MCV RBC AUTO: 84.8 FL (ref 80–100)
MONOCYTES # BLD AUTO: 273 CELLS/UL (ref 200–950)
MONOCYTES NFR BLD AUTO: 4.7 %
NEUTROPHILS # BLD AUTO: 2894 CELLS/UL (ref 1500–7800)
NEUTROPHILS NFR BLD AUTO: 49.9 %
NONHDLC SERPL-MCNC: 86 MG/DL (CALC)
PLATELET # BLD AUTO: 234 THOUSAND/UL (ref 140–400)
PMV BLD REES-ECKER: 10.7 FL (ref 7.5–12.5)
POTASSIUM SERPL-SCNC: 4.4 MMOL/L (ref 3.5–5.3)
PROT SERPL-MCNC: 6.7 G/DL (ref 6.1–8.1)
RBC # BLD AUTO: 5.05 MILLION/UL (ref 3.8–5.1)
SL AMB EGFR AFRICAN AMERICAN: 69 ML/MIN/1.73M2
SL AMB EGFR NON AFRICAN AMERICAN: 59 ML/MIN/1.73M2
SODIUM SERPL-SCNC: 142 MMOL/L (ref 135–146)
TRIGL SERPL-MCNC: 84 MG/DL
TSH SERPL-ACNC: 2.36 MIU/L (ref 0.4–4.5)
WBC # BLD AUTO: 5.8 THOUSAND/UL (ref 3.8–10.8)

## 2019-10-30 ENCOUNTER — OFFICE VISIT (OUTPATIENT)
Dept: FAMILY MEDICINE CLINIC | Facility: CLINIC | Age: 69
End: 2019-10-30
Payer: COMMERCIAL

## 2019-10-30 VITALS
HEIGHT: 60 IN | SYSTOLIC BLOOD PRESSURE: 118 MMHG | BODY MASS INDEX: 33.18 KG/M2 | OXYGEN SATURATION: 97 % | RESPIRATION RATE: 16 BRPM | HEART RATE: 69 BPM | DIASTOLIC BLOOD PRESSURE: 70 MMHG | WEIGHT: 169 LBS

## 2019-10-30 DIAGNOSIS — J20.9 ACUTE BRONCHITIS, UNSPECIFIED ORGANISM: ICD-10-CM

## 2019-10-30 DIAGNOSIS — Z11.59 NEED FOR HEPATITIS C SCREENING TEST: ICD-10-CM

## 2019-10-30 DIAGNOSIS — I10 BENIGN ESSENTIAL HYPERTENSION: ICD-10-CM

## 2019-10-30 DIAGNOSIS — E03.8 OTHER SPECIFIED HYPOTHYROIDISM: ICD-10-CM

## 2019-10-30 DIAGNOSIS — Z00.00 MEDICARE ANNUAL WELLNESS VISIT, SUBSEQUENT: Primary | ICD-10-CM

## 2019-10-30 DIAGNOSIS — Z78.0 MENOPAUSE: ICD-10-CM

## 2019-10-30 DIAGNOSIS — J30.1 SEASONAL ALLERGIC RHINITIS DUE TO POLLEN: ICD-10-CM

## 2019-10-30 DIAGNOSIS — Z12.11 SCREENING FOR COLON CANCER: ICD-10-CM

## 2019-10-30 DIAGNOSIS — I42.9 CARDIOMYOPATHY, UNSPECIFIED TYPE (HCC): ICD-10-CM

## 2019-10-30 PROCEDURE — 1125F AMNT PAIN NOTED PAIN PRSNT: CPT | Performed by: FAMILY MEDICINE

## 2019-10-30 PROCEDURE — 3078F DIAST BP <80 MM HG: CPT | Performed by: FAMILY MEDICINE

## 2019-10-30 PROCEDURE — 99213 OFFICE O/P EST LOW 20 MIN: CPT | Performed by: FAMILY MEDICINE

## 2019-10-30 PROCEDURE — 1170F FXNL STATUS ASSESSED: CPT | Performed by: FAMILY MEDICINE

## 2019-10-30 PROCEDURE — 1101F PT FALLS ASSESS-DOCD LE1/YR: CPT | Performed by: FAMILY MEDICINE

## 2019-10-30 PROCEDURE — 3074F SYST BP LT 130 MM HG: CPT | Performed by: FAMILY MEDICINE

## 2019-10-30 PROCEDURE — G0439 PPPS, SUBSEQ VISIT: HCPCS | Performed by: FAMILY MEDICINE

## 2019-10-30 PROCEDURE — 3008F BODY MASS INDEX DOCD: CPT | Performed by: FAMILY MEDICINE

## 2019-10-30 RX ORDER — FLUTICASONE PROPIONATE 50 MCG
SPRAY, SUSPENSION (ML) NASAL
Qty: 16 G | Refills: 3 | Status: SHIPPED | OUTPATIENT
Start: 2019-10-30 | End: 2020-11-09

## 2019-10-30 RX ORDER — LEVOTHYROXINE SODIUM 0.07 MG/1
75 TABLET ORAL EVERY MORNING
Qty: 90 TABLET | Refills: 1 | Status: SHIPPED | OUTPATIENT
Start: 2019-10-30 | End: 2020-03-16

## 2019-10-30 RX ORDER — ALBUTEROL SULFATE 90 UG/1
2 AEROSOL, METERED RESPIRATORY (INHALATION) EVERY 6 HOURS PRN
Qty: 1 INHALER | Refills: 0 | Status: SHIPPED | OUTPATIENT
Start: 2019-10-30 | End: 2021-01-21

## 2019-10-30 NOTE — PROGRESS NOTES
Assessment and Plan:     Problem List Items Addressed This Visit        Endocrine    Other specified hypothyroidism    Relevant Medications    levothyroxine 75 mcg tablet    Other Relevant Orders    TSH, 3rd generation       Respiratory    Allergic rhinitis    Relevant Medications    fluticasone (FLONASE) 50 mcg/act nasal spray    Acute bronchitis    Relevant Medications    albuterol (VENTOLIN HFA) 90 mcg/act inhaler    fluticasone (FLONASE) 50 mcg/act nasal spray       Cardiovascular and Mediastinum    Benign essential hypertension    Relevant Orders    CBC and differential    Comprehensive metabolic panel    Cardiomyopathy ([facility] [address] )    Relevant Orders    Lipid panel       Other    Need for hepatitis C screening test    Relevant Orders    Hepatitis C antibody    Screening for colon cancer - Primary    Relevant Orders    Cologuard    Menopause    Relevant Orders    DXA bone density spine hip and pelvis           Preventive health issues were discussed with patient, and age appropriate screening tests were ordered as noted in patient's After Visit Summary  Personalized health advice and appropriate referrals for health education or preventive services given if needed, as noted in patient's After Visit Summary       History of Present Illness:     Patient presents for Medicare Annual Wellness visit    Patient Care Team:  Sam Pearce MD as PCP - General  MD Paulo Diana MD     Problem List:     Patient Active Problem List   Diagnosis    Allergic rhinitis    Anxiety    Benign essential hypertension    Cardiomyopathy ([facility] [address] )    Hypercholesterolemia    Other specified hypothyroidism    Iron deficiency anemia    Lumbar radiculopathy    Needs flu shot    Acute bronchitis    Need for hepatitis C screening test    Screening for colon cancer    Menopause      Past Medical and Surgical History:     Past Medical History:   Diagnosis Date    Herpes zoster     onset: 12/6/10    Hypothyroidism onset: 1/31/12    Vertigo     onset: 1/31/12     Past Surgical History:   Procedure Laterality Date    CARDIAC PACEMAKER PLACEMENT        Family History:     Family History   Adopted: Yes   Problem Relation Age of Onset    No Known Problems Mother     Thyroid cancer Sister       Social History:     Social History     Socioeconomic History    Marital status:      Spouse name: None    Number of children: 4    Years of education: None    Highest education level: None   Occupational History    None   Social Needs    Financial resource strain: None    Food insecurity:     Worry: None     Inability: None    Transportation needs:     Medical: None     Non-medical: None   Tobacco Use    Smoking status: Never Smoker    Smokeless tobacco: Never Used   Substance and Sexual Activity    Alcohol use: No    Drug use: No    Sexual activity: None   Lifestyle    Physical activity:     Days per week: None     Minutes per session: None    Stress: None   Relationships    Social connections:     Talks on phone: None     Gets together: None     Attends Orthodox service: None     Active member of club or organization: None     Attends meetings of clubs or organizations: None     Relationship status: None    Intimate partner violence:     Fear of current or ex partner: None     Emotionally abused: None     Physically abused: None     Forced sexual activity: None   Other Topics Concern    None   Social History Narrative    Single as per [employer]       Medications and Allergies:     Current Outpatient Medications   Medication Sig Dispense Refill    albuterol (VENTOLIN HFA) 90 mcg/act inhaler Inhale 2 puffs every 6 (six) hours as needed for wheezing 1 Inhaler 0    ALPRAZolam (XANAX) 1 mg tablet       Aspirin Buf,CaCarb-MgCarb-MgO, (BUFFERED ASPIRIN) 325 MG TABS Take by mouth daily      atorvastatin (LIPITOR) 20 mg tablet take 1 tablet by mouth daily 90 tablet 1    carvedilol (COREG) 12 5 mg tablet       Cholecalciferol (VITAMIN D) 2000 units CAPS Take by mouth      escitalopram (LEXAPRO) 20 mg tablet Take 1 tablet by mouth daily  0    Ferrous Sulfate (IRON) 325 (65 Fe) MG TABS take 1 tablet by mouth once daily 90 tablet 1    fluticasone (FLONASE) 50 mcg/act nasal spray Used 2 spray each nostril once a day 16 g 3    levothyroxine 75 mcg tablet Take 1 tablet (75 mcg total) by mouth every morning 90 tablet 1    omega-3-acid ethyl esters (LOVAZA) 1 g capsule       ramipril (ALTACE) 10 MG capsule Take by mouth daily      traZODone (DESYREL) 150 mg tablet Take 150 mg by mouth daily        No current facility-administered medications for this visit  No Known Allergies   Immunizations:     Immunization History   Administered Date(s) Administered    INFLUENZA 10/18/2019    Influenza, high dose seasonal 0 5 mL 10/30/2018    Pneumococcal Conjugate 13-Valent 04/28/2016    Pneumococcal Polysaccharide PPV23 08/19/2017    Tdap 01/11/2016    influenza, trivalent, adjuvanted 10/18/2019      Health Maintenance:         Topic Date Due    Hepatitis C Screening  1950    CRC Screening: Colonoscopy  02/27/2020 (Originally 1950)    MAMMOGRAM  07/02/2020     There are no preventive care reminders to display for this patient  Medicare Health Risk Assessment:     /70   Pulse 69   Resp 16   Ht 5' (1 524 m)   Wt 76 7 kg (169 lb)   SpO2 97%   BMI 33 01 kg/m²      Myra is here for her Subsequent Wellness visit  Health Risk Assessment:   Patient rates overall health as good  Patient feels that their physical health rating is same  Eyesight was rated as same  Hearing was rated as same  Patient feels that their emotional and mental health rating is slightly better  Pain experienced in the last 7 days has been none  Depression Screening:   PHQ-2 Score: 0      Fall Risk Screening:    In the past year, patient has experienced: no history of falling in past year      Home Safety:  Patient does not have trouble with stairs inside or outside of their home  Patient has working smoke alarms and has working carbon monoxide detector  Home safety hazards include: none  Nutrition:   Current diet is Regular  Medications:   Patient is not currently taking any over-the-counter supplements  Patient is able to manage medications  Activities of Daily Living (ADLs)/Instrumental Activities of Daily Living (IADLs):   Walk and transfer into and out of bed and chair?: Yes  Dress and groom yourself?: Yes    Bathe or shower yourself?: Yes    Feed yourself?  Yes  Do your laundry/housekeeping?: Yes  Manage your money, pay your bills and track your expenses?: Yes  Make your own meals?: Yes    Do your own shopping?: Yes    Previous Hospitalizations:   Any hospitalizations or ED visits within the last 12 months?: No      Advance Care Planning:   Living will: No    Durable POA for healthcare: No    Advanced directive: No    Advanced directive counseling given: Yes      Cognitive Screening:   Provider or family/friend/caregiver concerned regarding cognition?: No    PREVENTIVE SCREENINGS      Cardiovascular Screening:    General: Screening Not Indicated and History Lipid Disorder      Diabetes Screening:     General: Screening Current      Colorectal Cancer Screening:     General: Screening Current      Breast Cancer Screening:     General: Screening Current      Cervical Cancer Screening:    General: Screening Not Indicated      Osteoporosis Screening:    General: Risks and Benefits Discussed      Abdominal Aortic Aneurysm (AAA) Screening:        General: Screening Not Indicated      Lung Cancer Screening:     General: Screening Not Indicated      Hepatitis C Screening:    General: Risks and Benefits Discussed      Rima Calvin MD

## 2019-10-30 NOTE — PATIENT INSTRUCTIONS
Medicare Preventive Visit Patient Instructions  Thank you for completing your Welcome to Medicare Visit or Medicare Annual Wellness Visit today  Your next wellness visit will be due in one year (10/30/2020)  The screening/preventive services that you may require over the next 5-10 years are detailed below  Some tests may not apply to you based off risk factors and/or age  Screening tests ordered at today's visit but not completed yet may show as past due  Also, please note that scanned in results may not display below  Preventive Screenings:  Service Recommendations Previous Testing/Comments   Colorectal Cancer Screening  * Colonoscopy    * Fecal Occult Blood Test (FOBT)/Fecal Immunochemical Test (FIT)  * Fecal DNA/Cologuard Test  * Flexible Sigmoidoscopy Age: 54-65 years old   Colonoscopy: every 10 years (may be performed more frequently if at higher risk)  OR  FOBT/FIT: every 1 year  OR  Cologuard: every 3 years  OR  Sigmoidoscopy: every 5 years  Screening may be recommended earlier than age 48 if at higher risk for colorectal cancer  Also, an individualized decision between you and your healthcare provider will decide whether screening between the ages of 74-80 would be appropriate  Colonoscopy: Not on file  FOBT/FIT: Not on file  Cologuard: Not on file  Sigmoidoscopy: Not on file    Screening Current     Breast Cancer Screening Age: 36 years old  Frequency: every 1-2 years  Not required if history of left and right mastectomy Mammogram: 07/02/2019    Screening Current   Cervical Cancer Screening Between the ages of 21-29, pap smear recommended once every 3 years  Between the ages of 33-67, can perform pap smear with HPV co-testing every 5 years     Recommendations may differ for women with a history of total hysterectomy, cervical cancer, or abnormal pap smears in past  Pap Smear: Not on file    Screening Not Indicated   Hepatitis C Screening Once for adults born between 1945 and 1965  More frequently in patients at high risk for Hepatitis C Hep C Antibody: Not on file       Diabetes Screening 1-2 times per year if you're at risk for diabetes or have pre-diabetes Fasting glucose: No results in last 5 years   A1C: 5 5 % of total Hgb    Screening Current   Cholesterol Screening Once every 5 years if you don't have a lipid disorder  May order more often based on risk factors  Lipid panel: 10/23/2019    Screening Not Indicated  History Lipid Disorder     Other Preventive Screenings Covered by Medicare:  1  Abdominal Aortic Aneurysm (AAA) Screening: covered once if your at risk  You're considered to be at risk if you have a family history of AAA  2  Lung Cancer Screening: covers low dose CT scan once per year if you meet all of the following conditions: (1) Age 50-69; (2) No signs or symptoms of lung cancer; (3) Current smoker or have quit smoking within the last 15 years; (4) You have a tobacco smoking history of at least 30 pack years (packs per day multiplied by number of years you smoked); (5) You get a written order from a healthcare provider  3  Glaucoma Screening: covered annually if you're considered high risk: (1) You have diabetes OR (2) Family history of glaucoma OR (3)  aged 48 and older OR (3)  American aged 72 and older  3  Osteoporosis Screening: covered every 2 years if you meet one of the following conditions: (1) You're estrogen deficient and at risk for osteoporosis based off medical history and other findings; (2) Have a vertebral abnormality; (3) On glucocorticoid therapy for more than 3 months; (4) Have primary hyperparathyroidism; (5) On osteoporosis medications and need to assess response to drug therapy  · Last bone density test (DXA Scan): Not on file  5  HIV Screening: covered annually if you're between the age of 12-76  Also covered annually if you are younger than 13 and older than 72 with risk factors for HIV infection   For pregnant patients, it is covered up to 3 times per pregnancy  Immunizations:  Immunization Recommendations   Influenza Vaccine Annual influenza vaccination during flu season is recommended for all persons aged >= 6 months who do not have contraindications   Pneumococcal Vaccine (Prevnar and Pneumovax)  * Prevnar = PCV13  * Pneumovax = PPSV23   Adults 25-60 years old: 1-3 doses may be recommended based on certain risk factors  Adults 72 years old: Prevnar (PCV13) vaccine recommended followed by Pneumovax (PPSV23) vaccine  If already received PPSV23 since turning 65, then PCV13 recommended at least one year after PPSV23 dose  Hepatitis B Vaccine 3 dose series if at intermediate or high risk (ex: diabetes, end stage renal disease, liver disease)   Tetanus (Td) Vaccine - COST NOT COVERED BY MEDICARE PART B Following completion of primary series, a booster dose should be given every 10 years to maintain immunity against tetanus  Td may also be given as tetanus wound prophylaxis  Tdap Vaccine - COST NOT COVERED BY MEDICARE PART B Recommended at least once for all adults  For pregnant patients, recommended with each pregnancy  Shingles Vaccine (Shingrix) - COST NOT COVERED BY MEDICARE PART B  2 shot series recommended in those aged 48 and above     Health Maintenance Due:      Topic Date Due    Hepatitis C Screening  1950    CRC Screening: Colonoscopy  02/27/2020 (Originally 1950)    MAMMOGRAM  07/02/2020     Immunizations Due:  There are no preventive care reminders to display for this patient  Advance Directives   What are advance directives? Advance directives are legal documents that state your wishes and plans for medical care  These plans are made ahead of time in case you lose your ability to make decisions for yourself  Advance directives can apply to any medical decision, such as the treatments you want, and if you want to donate organs  What are the types of advance directives?   There are many types of advance directives, and each state has rules about how to use them  You may choose a combination of any of the following:  · Living will: This is a written record of the treatment you want  You can also choose which treatments you do not want, which to limit, and which to stop at a certain time  This includes surgery, medicine, IV fluid, and tube feedings  · Durable power of  for healthcare [facility]): This is a written record that states who you want to make healthcare choices for you when you are unable to make them for yourself  This person, called a proxy, is usually a family member or a friend  You may choose more than 1 proxy  · Do not resuscitate (DNR) order:  A DNR order is used in case your heart stops beating or you stop breathing  It is a request not to have certain forms of treatment, such as CPR  A DNR order may be included in other types of advance directives  · Medical directive: This covers the care that you want if you are in a coma, near death, or unable to make decisions for yourself  You can list the treatments you want for each condition  Treatment may include pain medicine, surgery, blood transfusions, dialysis, IV or tube feedings, and a ventilator (breathing machine)  · Values history: This document has questions about your views, beliefs, and how you feel and think about life  This information can help others choose the care that you would choose  Why are advance directives important? An advance directive helps you control your care  Although spoken wishes may be used, it is better to have your wishes written down  Spoken wishes can be misunderstood, or not followed  Treatments may be given even if you do not want them  An advance directive may make it easier for your family to make difficult choices about your care     Weight Management   Why it is important to manage your weight:  Being overweight increases your risk of health conditions such as heart disease, high blood pressure, type 2 diabetes, and certain types of cancer  It can also increase your risk for osteoarthritis, sleep apnea, and other respiratory problems  Aim for a slow, steady weight loss  Even a small amount of weight loss can lower your risk of health problems  How to lose weight safely:  A safe and healthy way to lose weight is to eat fewer calories and get regular exercise  You can lose up about 1 pound a week by decreasing the number of calories you eat by 500 calories each day  Healthy meal plan for weight management:  A healthy meal plan includes a variety of foods, contains fewer calories, and helps you stay healthy  A healthy meal plan includes the following:  · Eat whole-grain foods more often  A healthy meal plan should contain fiber  Fiber is the part of grains, fruits, and vegetables that is not broken down by your body  Whole-grain foods are healthy and provide extra fiber in your diet  Some examples of whole-grain foods are whole-wheat breads and pastas, oatmeal, brown rice, and bulgur  · Eat a variety of vegetables every day  Include dark, leafy greens such as spinach, kale, sergio greens, and mustard greens  Eat yellow and orange vegetables such as carrots, sweet potatoes, and winter squash  · Eat a variety of fruits every day  Choose fresh or canned fruit (canned in its own juice or light syrup) instead of juice  Fruit juice has very little or no fiber  · Eat low-fat dairy foods  Drink fat-free (skim) milk or 1% milk  Eat fat-free yogurt and low-fat cottage cheese  Try low-fat cheeses such as mozzarella and other reduced-fat cheeses  · Choose meat and other protein foods that are low in fat  Choose beans or other legumes such as split peas or lentils  Choose fish, skinless poultry (chicken or turkey), or lean cuts of red meat (beef or pork)  Before you cook meat or poultry, cut off any visible fat  · Use less fat and oil  Try baking foods instead of frying them   Add less fat, such as margarine, sour cream, regular salad dressing and mayonnaise to foods  Eat fewer high-fat foods  Some examples of high-fat foods include french fries, doughnuts, ice cream, and cakes  · Eat fewer sweets  Limit foods and drinks that are high in sugar  This includes candy, cookies, regular soda, and sweetened drinks  Exercise:  Exercise at least 30 minutes per day on most days of the week  Some examples of exercise include walking, biking, dancing, and swimming  You can also fit in more physical activity by taking the stairs instead of the elevator or parking farther away from stores  Ask your healthcare provider about the best exercise plan for you  © Copyright [employer] 2018 Information is for End User's use only and may not be sold, redistributed or otherwise used for commercial purposes   All illustrations and images included in CareNotes® are the copyrighted property of A D A M , Inc  or [address] [place] [address]

## 2019-10-30 NOTE — PROGRESS NOTES
Assessment/Plan:    Problem List Items Addressed This Visit        Endocrine    Other specified hypothyroidism - Primary     Continue same dose of levothyroxine         Relevant Medications    levothyroxine 75 mcg tablet    Other Relevant Orders    TSH, 3rd generation       Respiratory    Allergic rhinitis    Relevant Medications    fluticasone (FLONASE) 50 mcg/act nasal spray    RESOLVED: Acute bronchitis    Relevant Medications    albuterol (VENTOLIN HFA) 90 mcg/act inhaler    fluticasone (FLONASE) 50 mcg/act nasal spray       Cardiovascular and Mediastinum    Benign essential hypertension     Blood pressure is well controlled         Relevant Orders    CBC and differential    Comprehensive metabolic panel    Cardiomyopathy ([address] )     Stable and follows cardiologist         Relevant Orders    Lipid panel       Other    Need for hepatitis C screening test    Relevant Orders    Hepatitis C antibody    Screening for colon cancer    Relevant Orders    Cologuard    Menopause    Relevant Orders    DXA bone density spine hip and pelvis          Chief Complaint   Patient presents with    Medicare Wellness Visit    Follow-up       Subjective:   Patient ID: Arianne Herrera is a 77 y o  female  She is here follow-up on her labs, she is doing well and she is very active with her grandchildren, she has cardiomyopathy follows cardiologist has been stable, no new symptoms  Takes all her medication regularly,  She has hypothyroid, does not feel any fatigue and she is on levothyroxine 75 mcg  She follows psychiatrist and he advised her to take alprazolam half dose and slowly it he will taper down  She takes iron to keep her hemoglobin up, recently are hemoglobin is 14      Review of Systems   Constitutional: Negative for activity change, appetite change, chills, diaphoresis, fatigue, fever and unexpected weight change     HENT: Negative for congestion, dental problem, ear discharge, ear pain, facial swelling, hearing loss, mouth sores, nosebleeds, postnasal drip, rhinorrhea, sinus pressure, sinus pain, sneezing, sore throat, trouble swallowing and voice change  Eyes: Negative for photophobia, pain, discharge, redness and itching  Respiratory: Negative for cough, chest tightness, shortness of breath and wheezing  Cardiovascular: Negative for chest pain, palpitations and leg swelling  Gastrointestinal: Negative for abdominal distention, abdominal pain, blood in stool, constipation, diarrhea and nausea  Endocrine: Negative for cold intolerance, heat intolerance, polydipsia, polyphagia and polyuria  Genitourinary: Negative for dysuria, flank pain, frequency, hematuria and urgency  Musculoskeletal: Negative for arthralgias, back pain, myalgias and neck pain  Skin: Negative for color change and pallor  Allergic/Immunologic: Negative for environmental allergies and food allergies  Neurological: Negative for dizziness, weakness, light-headedness, numbness and headaches  Hematological: Negative for adenopathy  Does not bruise/bleed easily  Psychiatric/Behavioral: Negative for behavioral problems, sleep disturbance and suicidal ideas  The patient is not nervous/anxious  Objective:  Physical Exam   Constitutional: She is oriented to person, place, and time  She appears well-developed and well-nourished  HENT:   Head: Normocephalic and atraumatic  Nose: Nose normal    Mouth/Throat: Oropharynx is clear and moist  No oropharyngeal exudate  Eyes: Pupils are equal, round, and reactive to light  Conjunctivae and EOM are normal  Right eye exhibits no discharge  Left eye exhibits no discharge  No scleral icterus  Neck: Normal range of motion  Neck supple  No tracheal deviation present  No thyromegaly present  Cardiovascular: Normal rate, regular rhythm and normal heart sounds  No murmur heard  Pulmonary/Chest: Effort normal and breath sounds normal  No respiratory distress  She has no wheezes   She has no rales    Abdominal: Soft  Bowel sounds are normal  She exhibits no distension and no mass  There is no tenderness  There is no rebound  Musculoskeletal: Normal range of motion  She exhibits no edema  Lymphadenopathy:     She has no cervical adenopathy  Neurological: She is alert and oriented to person, place, and time  She has normal reflexes  No cranial nerve deficit  Skin: Skin is warm  No rash noted  No erythema  No pallor  Psychiatric: She has a normal mood and affect  Her behavior is normal  Judgment and thought content normal    Nursing note and vitals reviewed           Past Surgical History:   Procedure Laterality Date    CARDIAC PACEMAKER PLACEMENT         Family History   Adopted: Yes   Problem Relation Age of Onset    No Known Problems Mother     Thyroid cancer Sister          Current Outpatient Medications:     albuterol (VENTOLIN HFA) 90 mcg/act inhaler, Inhale 2 puffs every 6 (six) hours as needed for wheezing, Disp: 1 Inhaler, Rfl: 0    ALPRAZolam (XANAX) 1 mg tablet, , Disp: , Rfl:     Aspirin Buf,CaCarb-MgCarb-MgO, (BUFFERED ASPIRIN) 325 MG TABS, Take by mouth daily, Disp: , Rfl:     atorvastatin (LIPITOR) 20 mg tablet, take 1 tablet by mouth daily, Disp: 90 tablet, Rfl: 1    carvedilol (COREG) 12 5 mg tablet, , Disp: , Rfl:     Cholecalciferol (VITAMIN D) 2000 units CAPS, Take by mouth, Disp: , Rfl:     escitalopram (LEXAPRO) 20 mg tablet, Take 1 tablet by mouth daily, Disp: , Rfl: 0    Ferrous Sulfate (IRON) 325 (65 Fe) MG TABS, take 1 tablet by mouth once daily, Disp: 90 tablet, Rfl: 1    fluticasone (FLONASE) 50 mcg/act nasal spray, Used 2 spray each nostril once a day, Disp: 16 g, Rfl: 3    levothyroxine 75 mcg tablet, Take 1 tablet (75 mcg total) by mouth every morning, Disp: 90 tablet, Rfl: 1    omega-3-acid ethyl esters (LOVAZA) 1 g capsule, , Disp: , Rfl:     ramipril (ALTACE) 10 MG capsule, Take by mouth daily, Disp: , Rfl:     traZODone (DESYREL) 150 mg tablet, Take 150 mg by mouth daily , Disp: , Rfl:     No Known Allergies    Vitals:    10/30/19 1500   BP: 118/70   Pulse: 69   Resp: 16   SpO2: 97%   Weight: 76 7 kg (169 lb)   Height: 5' (1 524 m)

## 2019-11-19 ENCOUNTER — OFFICE VISIT (OUTPATIENT)
Dept: FAMILY MEDICINE CLINIC | Facility: CLINIC | Age: 69
End: 2019-11-19
Payer: COMMERCIAL

## 2019-11-19 VITALS
TEMPERATURE: 97.7 F | RESPIRATION RATE: 16 BRPM | OXYGEN SATURATION: 97 % | SYSTOLIC BLOOD PRESSURE: 118 MMHG | HEIGHT: 60 IN | HEART RATE: 62 BPM | DIASTOLIC BLOOD PRESSURE: 74 MMHG | BODY MASS INDEX: 32.98 KG/M2 | WEIGHT: 168 LBS

## 2019-11-19 DIAGNOSIS — N39.0 ACUTE LOWER UTI: ICD-10-CM

## 2019-11-19 DIAGNOSIS — N39.0 URINARY TRACT INFECTION WITH HEMATURIA, SITE UNSPECIFIED: Primary | ICD-10-CM

## 2019-11-19 DIAGNOSIS — R31.9 URINARY TRACT INFECTION WITH HEMATURIA, SITE UNSPECIFIED: Primary | ICD-10-CM

## 2019-11-19 LAB
SL AMB  POCT GLUCOSE, UA: ABNORMAL
SL AMB LEUKOCYTE ESTERASE,UA: 500
SL AMB POCT BILIRUBIN,UA: 1
SL AMB POCT BLOOD,UA: 250
SL AMB POCT CLARITY,UA: ABNORMAL
SL AMB POCT COLOR,UA: YELLOW
SL AMB POCT KETONES,UA: 1
SL AMB POCT NITRITE,UA: ABNORMAL
SL AMB POCT PH,UA: 5
SL AMB POCT SPECIFIC GRAVITY,UA: 1.02
SL AMB POCT URINE PROTEIN: ABNORMAL
SL AMB POCT UROBILINOGEN: 1

## 2019-11-19 PROCEDURE — 1160F RVW MEDS BY RX/DR IN RCRD: CPT | Performed by: FAMILY MEDICINE

## 2019-11-19 PROCEDURE — 81003 URINALYSIS AUTO W/O SCOPE: CPT | Performed by: FAMILY MEDICINE

## 2019-11-19 PROCEDURE — 99213 OFFICE O/P EST LOW 20 MIN: CPT | Performed by: FAMILY MEDICINE

## 2019-11-19 PROCEDURE — 1036F TOBACCO NON-USER: CPT | Performed by: FAMILY MEDICINE

## 2019-11-19 RX ORDER — LEVOFLOXACIN 500 MG/1
500 TABLET, FILM COATED ORAL EVERY 24 HOURS
Qty: 5 TABLET | Refills: 0 | Status: SHIPPED | OUTPATIENT
Start: 2019-11-19 | End: 2019-11-22

## 2019-11-19 NOTE — PROGRESS NOTES
Subjective:      Patient ID: Mabel Mike is a 77 y o  female  Patient presents complaining of a 2 day history of urinary symptoms including frequency, urgency, difficulty voiding  No fevers or chills  No flank pain  No blood in her urine  Patient states that she has never had a urinary tract infection before in her life  She does not drink water on a regular basis  She has been drinking some cranberry juice      Past Medical History:   Diagnosis Date    Herpes zoster     onset: 12/6/10    Hypothyroidism     onset: 1/31/12    Vertigo     onset: 1/31/12       Family History   Adopted: Yes   Problem Relation Age of Onset    No Known Problems Mother     Thyroid cancer Sister        Past Surgical History:   Procedure Laterality Date    CARDIAC PACEMAKER PLACEMENT          reports that she has never smoked  She has never used smokeless tobacco  She reports that she does not drink alcohol or use drugs        Current Outpatient Medications:     albuterol (VENTOLIN HFA) 90 mcg/act inhaler, Inhale 2 puffs every 6 (six) hours as needed for wheezing, Disp: 1 Inhaler, Rfl: 0    ALPRAZolam (XANAX) 1 mg tablet, , Disp: , Rfl:     Aspirin Buf,CaCarb-MgCarb-MgO, (BUFFERED ASPIRIN) 325 MG TABS, Take by mouth daily, Disp: , Rfl:     atorvastatin (LIPITOR) 20 mg tablet, take 1 tablet by mouth daily, Disp: 90 tablet, Rfl: 1    carvedilol (COREG) 12 5 mg tablet, , Disp: , Rfl:     Cholecalciferol (VITAMIN D) 2000 units CAPS, Take by mouth, Disp: , Rfl:     escitalopram (LEXAPRO) 20 mg tablet, Take 1 tablet by mouth daily, Disp: , Rfl: 0    Ferrous Sulfate (IRON) 325 (65 Fe) MG TABS, take 1 tablet by mouth once daily, Disp: 90 tablet, Rfl: 1    fluticasone (FLONASE) 50 mcg/act nasal spray, Used 2 spray each nostril once a day, Disp: 16 g, Rfl: 3    levothyroxine 75 mcg tablet, Take 1 tablet (75 mcg total) by mouth every morning, Disp: 90 tablet, Rfl: 1    omega-3-acid ethyl esters (LOVAZA) 1 g capsule, , Disp: , Rfl:     ramipril (ALTACE) 10 MG capsule, Take by mouth daily, Disp: , Rfl:     traZODone (DESYREL) 150 mg tablet, Take 150 mg by mouth daily , Disp: , Rfl:     levofloxacin (LEVAQUIN) 500 mg tablet, Take 1 tablet (500 mg total) by mouth every 24 hours for 5 days, Disp: 5 tablet, Rfl: 0    The following portions of the patient's history were reviewed and updated as appropriate: allergies, current medications, past family history, past medical history, past social history, past surgical history and problem list     Review of Systems   Constitutional: Negative for activity change, appetite change, chills, fatigue and fever  Respiratory: Negative  Cardiovascular: Negative  Gastrointestinal: Negative  Negative for abdominal pain  Genitourinary: Positive for difficulty urinating, dysuria and urgency  Negative for decreased urine volume, flank pain, hematuria, vaginal bleeding, vaginal discharge and vaginal pain  Musculoskeletal: Negative  Objective:    /74   Pulse 62   Temp 97 7 °F (36 5 °C) (Tympanic)   Resp 16   Ht 5' (1 524 m)   Wt 76 2 kg (168 lb)   SpO2 97%   BMI 32 81 kg/m²      Physical Exam   Constitutional: She appears well-developed and well-nourished  No distress  Neck: Normal range of motion  Neck supple  Cardiovascular: Normal rate, regular rhythm and normal heart sounds  Pulmonary/Chest: Effort normal and breath sounds normal    Abdominal: Soft  Bowel sounds are normal    Musculoskeletal: She exhibits no edema  Nursing note and vitals reviewed          Recent Results (from the past 1008 hour(s))   Lipid panel    Collection Time: 10/23/19 11:22 AM   Result Value Ref Range    Total Cholesterol 132 <200 mg/dL    HDL 46 (L) >50 mg/dL    Triglycerides 84 <150 mg/dL    LDL Direct 70 mg/dL (calc)    Chol HDLC Ratio 2 9 <5 0 (calc)    Non-HDL Cholesterol 86 <130 mg/dL (calc)   Comprehensive metabolic panel    Collection Time: 10/23/19 11:22 AM   Result Value Ref Range    Glucose, Random 93 65 - 99 mg/dL    BUN 16 7 - 25 mg/dL    Creatinine 0 98 0 50 - 0 99 mg/dL    eGFR Non  59 (L) > OR = 60 mL/min/1 73m2    eGFR  69 > OR = 60 mL/min/1 73m2    SL AMB BUN/CREATININE RATIO NOT APPLICABLE 6 - 22 (calc)    Sodium 142 135 - 146 mmol/L    Potassium 4 4 3 5 - 5 3 mmol/L    Chloride 105 98 - 110 mmol/L    CO2 30 20 - 32 mmol/L    SL AMB CALCIUM 9 5 8 6 - 10 4 mg/dL    Protein, Total 6 7 6 1 - 8 1 g/dL    Albumin 4 1 3 6 - 5 1 g/dL    Globulin 2 6 1 9 - 3 7 g/dL (calc)    Albumin/Globulin Ratio 1 6 1 0 - 2 5 (calc)    TOTAL BILIRUBIN 1 0 0 2 - 1 2 mg/dL    Alkaline Phosphatase 63 33 - 130 U/L    AST 15 10 - 35 U/L    ALT 13 6 - 29 U/L   CBC and differential    Collection Time: 10/23/19 11:22 AM   Result Value Ref Range    White Blood Cell Count 5 8 3 8 - 10 8 Thousand/uL    Red Blood Cell Count 5 05 3 80 - 5 10 Million/uL    Hemoglobin 14 1 11 7 - 15 5 g/dL    HCT 42 8 35 0 - 45 0 %    MCV 84 8 80 0 - 100 0 fL    MCH 27 9 27 0 - 33 0 pg    MCHC 32 9 32 0 - 36 0 g/dL    RDW 12 8 11 0 - 15 0 %    Platelet Count 110 514 - 400 Thousand/uL    SL AMB MPV 10 7 7 5 - 12 5 fL    Neutrophils (Absolute) 2,894 1,500 - 7,800 cells/uL    Lymphocytes (Absolute) 2,459 850 - 3,900 cells/uL    Monocytes (Absolute) 273 200 - 950 cells/uL    Eosinophils (Absolute) 122 15 - 500 cells/uL    Basophils ABS 52 0 - 200 cells/uL    Neutrophils 49 9 %    Lymphocytes 42 4 %    Monocytes 4 7 %    Eosinophils 2 1 %    Basophils PCT 0 9 %   TSH, 3rd generation    Collection Time: 10/23/19 11:22 AM   Result Value Ref Range    TSH 2 36 0 40 - 4 50 mIU/L   Hemoglobin A1c (w/out EAG)    Collection Time: 10/23/19 11:22 AM   Result Value Ref Range    Hemoglobin A1C 5 5 <5 7 % of total Hgb   POCT urine dip    Collection Time: 11/19/19  4:15 PM   Result Value Ref Range    LEUKOCYTE ESTERASE,     NITRITE,UA NEG     SL AMB POCT UROBILINOGEN 1     POCT URINE PROTEIN TR      PH,UA 5 BLOOD,     SPECIFIC GRAVITY,UA 1 025     KETONES,UA 1     BILIRUBIN,UA 1     GLUCOSE, UA NORM      COLOR,UA YELLOW     CLARITY,UA TRANSPARENT        Assessment/Plan:    Acute lower UTI  - abnormal urinalysis with leukocyte esterase, blood, protein  -high specific gravity  Patient advised to increase her oral intake of fluids especially water  She does not like to drink water  Try harder     -uro be sent for culture and sensitivity  Will contact with results if there is antibiotic resistance     -patient will be placed on Levaquin 500 mg daily x5 days          Problem List Items Addressed This Visit        Genitourinary    Acute lower UTI     - abnormal urinalysis with leukocyte esterase, blood, protein  -high specific gravity  Patient advised to increase her oral intake of fluids especially water  She does not like to drink water  Try harder     -uro be sent for culture and sensitivity    Will contact with results if there is antibiotic resistance     -patient will be placed on Levaquin 500 mg daily x5 days         Relevant Medications    levofloxacin (LEVAQUIN) 500 mg tablet      Other Visit Diagnoses     Urinary tract infection with hematuria, site unspecified    -  Primary    Relevant Medications    levofloxacin (LEVAQUIN) 500 mg tablet    Other Relevant Orders    POCT urine dip (Completed)

## 2019-11-19 NOTE — ASSESSMENT & PLAN NOTE
- abnormal urinalysis with leukocyte esterase, blood, protein  -high specific gravity  Patient advised to increase her oral intake of fluids especially water  She does not like to drink water  Try harder     -uro be sent for culture and sensitivity    Will contact with results if there is antibiotic resistance     -patient will be placed on Levaquin 500 mg daily x5 days

## 2019-11-22 DIAGNOSIS — N39.0 ACUTE LOWER UTI: Primary | ICD-10-CM

## 2019-11-22 RX ORDER — AMOXICILLIN AND CLAVULANATE POTASSIUM 875; 125 MG/1; MG/1
1 TABLET, FILM COATED ORAL EVERY 12 HOURS SCHEDULED
Qty: 20 TABLET | Refills: 0 | Status: SHIPPED | OUTPATIENT
Start: 2019-11-22 | End: 2019-11-29

## 2019-12-10 ENCOUNTER — DOCTOR'S OFFICE (OUTPATIENT)
Dept: URBAN - METROPOLITAN AREA CLINIC 137 | Facility: CLINIC | Age: 69
Setting detail: OPHTHALMOLOGY
End: 2019-12-10
Payer: COMMERCIAL

## 2019-12-10 DIAGNOSIS — H52.03: ICD-10-CM

## 2019-12-10 PROBLEM — H04.123 DRY EYE; BOTH EYES: Status: ACTIVE | Noted: 2018-05-23

## 2019-12-10 PROBLEM — H43.392 VITREOUS FLOATERS ; LEFT EYE: Status: ACTIVE | Noted: 2018-05-23

## 2019-12-10 PROBLEM — H52.4 PRESBYOPIA ; BOTH EYES: Status: ACTIVE | Noted: 2018-05-23

## 2019-12-10 PROBLEM — H52.223 ASTIGMATISM, REGULAR; BOTH EYES: Status: ACTIVE | Noted: 2019-12-10

## 2019-12-10 PROBLEM — H25.13 CATARACT NUCLEAR SCLEROSIS; BOTH EYES: Status: ACTIVE | Noted: 2018-05-23

## 2019-12-10 PROCEDURE — 92014 COMPRE OPH EXAM EST PT 1/>: CPT | Performed by: OPTOMETRIST

## 2019-12-10 ASSESSMENT — REFRACTION_MANIFEST
OS_VA2: 20/20(J1+)
OS_SPHERE: +1.00
OS_ADD: +2.00
OU_VA: 20/20
OS_VA3: 20/
OD_ADD: +2.00
OD_CYLINDER: SPH
OD_CYLINDER: -0.25
OS_CYLINDER: SPH
OD_SPHERE: +0.50
OS_VA2: 20/
OD_VA1: 20/20
OD_AXIS: 90
OD_VA1: 20/20
OS_VA1: 20/20-1
OD_VA2: 20/
OD_VA3: 20/
OS_AXIS: 80
OS_VA1: 20/20
OS_CYLINDER: -0.25
OS_VA3: 20/
OU_VA: 20/
OD_ADD: +2.00
OD_VA3: 20/
OD_VA2: 20/20(J1+)
OD_SPHERE: +0.75
OS_SPHERE: +1.50
OS_ADD: +2.00

## 2019-12-10 ASSESSMENT — AXIALLENGTH_DERIVED
OS_AL: 23.1562
OD_AL: 23.5322

## 2019-12-10 ASSESSMENT — KERATOMETRY
OS_K1POWER_DIOPTERS: 43.00
OS_K2POWER_DIOPTERS: 43.50
OD_K1POWER_DIOPTERS: 42.75
OD_AXISANGLE_DEGREES: 180
OS_AXISANGLE_DEGREES: 007
OD_K2POWER_DIOPTERS: 43.25

## 2019-12-10 ASSESSMENT — REFRACTION_CURRENTRX
OS_OVR_VA: 20/
OS_OVR_VA: 20/
OD_OVR_VA: 20/
OD_OVR_VA: 20/
OS_OVR_VA: 20/
OD_OVR_VA: 20/

## 2019-12-10 ASSESSMENT — REFRACTION_AUTOREFRACTION
OD_CYLINDER: -0.25
OD_AXIS: 103
OS_CYLINDER: SPH
OD_SPHERE: PLANO
OS_SPHERE: +1.00

## 2019-12-10 ASSESSMENT — CONFRONTATIONAL VISUAL FIELD TEST (CVF)
OS_FINDINGS: FULL
OD_FINDINGS: FULL

## 2019-12-10 ASSESSMENT — VISUAL ACUITY
OD_BCVA: 20/50+2
OS_BCVA: 20/30-1

## 2019-12-10 ASSESSMENT — SPHEQUIV_DERIVED
OD_SPHEQUIV: 0.625
OS_SPHEQUIV: 1.375

## 2019-12-11 ENCOUNTER — OPTICAL OFFICE (OUTPATIENT)
Dept: URBAN - METROPOLITAN AREA CLINIC 146 | Facility: CLINIC | Age: 69
Setting detail: OPHTHALMOLOGY
End: 2019-12-11
Payer: COMMERCIAL

## 2019-12-11 DIAGNOSIS — H52.223: ICD-10-CM

## 2019-12-11 PROCEDURE — V2781 PROGRESSIVE LENS PER LENS: HCPCS | Performed by: OPTOMETRIST

## 2019-12-11 PROCEDURE — V2020 VISION SVCS FRAMES PURCHASES: HCPCS | Performed by: OPTOMETRIST

## 2019-12-13 DIAGNOSIS — D50.9 IRON DEFICIENCY ANEMIA, UNSPECIFIED IRON DEFICIENCY ANEMIA TYPE: ICD-10-CM

## 2019-12-13 RX ORDER — FERROUS SULFATE 325(65) MG
TABLET ORAL
Qty: 90 TABLET | Refills: 0 | Status: SHIPPED | OUTPATIENT
Start: 2019-12-13 | End: 2020-02-27 | Stop reason: ALTCHOICE

## 2020-02-27 ENCOUNTER — OFFICE VISIT (OUTPATIENT)
Dept: FAMILY MEDICINE CLINIC | Facility: CLINIC | Age: 70
End: 2020-02-27
Payer: COMMERCIAL

## 2020-02-27 ENCOUNTER — TELEPHONE (OUTPATIENT)
Dept: GASTROENTEROLOGY | Facility: MEDICAL CENTER | Age: 70
End: 2020-02-27

## 2020-02-27 VITALS
WEIGHT: 168 LBS | SYSTOLIC BLOOD PRESSURE: 114 MMHG | BODY MASS INDEX: 32.98 KG/M2 | RESPIRATION RATE: 18 BRPM | DIASTOLIC BLOOD PRESSURE: 70 MMHG | HEART RATE: 65 BPM | HEIGHT: 60 IN

## 2020-02-27 DIAGNOSIS — R19.5 POSITIVE FIT (FECAL IMMUNOCHEMICAL TEST): Primary | ICD-10-CM

## 2020-02-27 DIAGNOSIS — I42.0 DILATED CARDIOMYOPATHY (HCC): ICD-10-CM

## 2020-02-27 DIAGNOSIS — I10 BENIGN ESSENTIAL HYPERTENSION: ICD-10-CM

## 2020-02-27 PROCEDURE — 1036F TOBACCO NON-USER: CPT | Performed by: FAMILY MEDICINE

## 2020-02-27 PROCEDURE — 3008F BODY MASS INDEX DOCD: CPT | Performed by: FAMILY MEDICINE

## 2020-02-27 PROCEDURE — 3078F DIAST BP <80 MM HG: CPT | Performed by: FAMILY MEDICINE

## 2020-02-27 PROCEDURE — 99214 OFFICE O/P EST MOD 30 MIN: CPT | Performed by: FAMILY MEDICINE

## 2020-02-27 PROCEDURE — 3074F SYST BP LT 130 MM HG: CPT | Performed by: FAMILY MEDICINE

## 2020-02-27 PROCEDURE — 4040F PNEUMOC VAC/ADMIN/RCVD: CPT | Performed by: FAMILY MEDICINE

## 2020-02-27 PROCEDURE — 1160F RVW MEDS BY RX/DR IN RCRD: CPT | Performed by: FAMILY MEDICINE

## 2020-02-27 NOTE — PROGRESS NOTES
Assessment/Plan:    Problem List Items Addressed This Visit        Cardiovascular and Mediastinum    Benign essential hypertension  Hypertension is stable, she will continue same medication    Cardiomyopathy [facility])  [address] cardiologist       Other    Positive FIT (fecal immunochemical test) - Primary    Relevant Orders    Ambulatory referral for colonoscopy  Positive test in the past she had negative tests, advised to see gastroenterologist for diagnostic colonoscopy  She has been on iron for years, she will stop the iron as her previous hemoglobin is really good she will have repeat labs in April          Chief Complaint   Patient presents with    Follow-up     review fit test       Subjective:   Patient ID: Jacquelyn Argueta is a 77 y o  female  She is here for follow-up on her positive FIT test ,, she had 5 years ago and that test was negative, nits positive, she says her bowel movements are normal these new dark color stool, no abdominal pain her appetite is normal she is not losing any weight  She has a long history of anemia and she has been on iron her latest hemoglobin has been good she has cardiomyopathy and follows cardiologist takes all her medication and she takes the full aspirin for years as recommended by her cardiologist  She has hypertension and cardiomyopathy and has been stable      Review of Systems   Constitutional: Negative for activity change, appetite change, chills, diaphoresis, fatigue, fever and unexpected weight change  HENT: Negative for congestion, dental problem, ear discharge, ear pain, facial swelling, hearing loss, mouth sores, nosebleeds, postnasal drip, rhinorrhea, sinus pressure, sinus pain, sneezing, sore throat, trouble swallowing and voice change  Eyes: Negative for photophobia, pain, discharge, redness and itching  Respiratory: Negative for cough, chest tightness, shortness of breath and wheezing      Cardiovascular: Negative for chest pain, palpitations and leg swelling  Gastrointestinal: Negative for abdominal distention, abdominal pain, blood in stool, constipation, diarrhea and nausea  Endocrine: Negative for cold intolerance, heat intolerance, polydipsia, polyphagia and polyuria  Genitourinary: Negative for dysuria, flank pain, frequency, hematuria and urgency  Musculoskeletal: Negative for arthralgias, back pain, myalgias and neck pain  Skin: Negative for color change and pallor  Allergic/Immunologic: Negative for environmental allergies and food allergies  Neurological: Negative for dizziness, weakness, light-headedness, numbness and headaches  Hematological: Negative for adenopathy  Does not bruise/bleed easily  Psychiatric/Behavioral: Negative for behavioral problems, sleep disturbance and suicidal ideas  The patient is not nervous/anxious  Objective:  Physical Exam   Constitutional: She appears well-developed and well-nourished  HENT:   Head: Normocephalic and atraumatic  Mouth/Throat: Oropharynx is clear and moist    Eyes: Conjunctivae and EOM are normal  No scleral icterus  Neck: Normal range of motion  Neck supple  No thyromegaly present  Cardiovascular: Normal rate, regular rhythm and normal heart sounds  Pulmonary/Chest: Effort normal and breath sounds normal  She has no wheezes  She has no rales  Lymphadenopathy:     She has no cervical adenopathy  Neurological: She is alert  Skin: No rash noted  No erythema  Psychiatric: She has a normal mood and affect  Nursing note and vitals reviewed           Past Surgical History:   Procedure Laterality Date    CARDIAC PACEMAKER PLACEMENT         Family History   Adopted: Yes   Problem Relation Age of Onset    No Known Problems Mother     Thyroid cancer Sister          Current Outpatient Medications:     albuterol (VENTOLIN HFA) 90 mcg/act inhaler, Inhale 2 puffs every 6 (six) hours as needed for wheezing, Disp: 1 Inhaler, Rfl: 0    ALPRAZolam (XANAX) 1 mg tablet, , Disp: , Rfl:     Aspirin Buf,CaCarb-MgCarb-MgO, (BUFFERED ASPIRIN) 325 MG TABS, Take by mouth daily, Disp: , Rfl:     atorvastatin (LIPITOR) 20 mg tablet, take 1 tablet by mouth daily, Disp: 90 tablet, Rfl: 1    carvedilol (COREG) 12 5 mg tablet, , Disp: , Rfl:     Cholecalciferol (VITAMIN D) 2000 units CAPS, Take by mouth, Disp: , Rfl:     escitalopram (LEXAPRO) 20 mg tablet, Take 1 tablet by mouth daily, Disp: , Rfl: 0    fluticasone (FLONASE) 50 mcg/act nasal spray, Used 2 spray each nostril once a day, Disp: 16 g, Rfl: 3    levothyroxine 75 mcg tablet, Take 1 tablet (75 mcg total) by mouth every morning, Disp: 90 tablet, Rfl: 1    omega-3-acid ethyl esters (LOVAZA) 1 g capsule, , Disp: , Rfl:     ramipril (ALTACE) 10 MG capsule, Take by mouth daily, Disp: , Rfl:     traZODone (DESYREL) 150 mg tablet, Take 150 mg by mouth daily , Disp: , Rfl:     No Known Allergies    Vitals:    02/27/20 1442   BP: 114/70   Pulse: 65   Resp: 18   Weight: 76 2 kg (168 lb)   Height: 5' (1 524 m)

## 2020-03-16 DIAGNOSIS — E03.8 OTHER SPECIFIED HYPOTHYROIDISM: ICD-10-CM

## 2020-03-16 RX ORDER — LEVOTHYROXINE SODIUM 0.07 MG/1
TABLET ORAL
Qty: 90 TABLET | Refills: 1 | Status: SHIPPED | OUTPATIENT
Start: 2020-03-16 | End: 2020-06-09

## 2020-04-10 ENCOUNTER — TELEPHONE (OUTPATIENT)
Dept: GASTROENTEROLOGY | Facility: AMBULARY SURGERY CENTER | Age: 70
End: 2020-04-10

## 2020-04-15 DIAGNOSIS — E78.2 MIXED HYPERLIPIDEMIA: ICD-10-CM

## 2020-04-15 RX ORDER — ATORVASTATIN CALCIUM 20 MG/1
TABLET, FILM COATED ORAL
Qty: 90 TABLET | Refills: 1 | Status: SHIPPED | OUTPATIENT
Start: 2020-04-15 | End: 2020-10-13

## 2020-04-20 ENCOUNTER — TELEMEDICINE (OUTPATIENT)
Dept: GASTROENTEROLOGY | Facility: AMBULARY SURGERY CENTER | Age: 70
End: 2020-04-20
Payer: COMMERCIAL

## 2020-04-20 DIAGNOSIS — R19.5 POSITIVE FIT (FECAL IMMUNOCHEMICAL TEST): Primary | ICD-10-CM

## 2020-04-20 PROCEDURE — G2012 BRIEF CHECK IN BY MD/QHP: HCPCS | Performed by: INTERNAL MEDICINE

## 2020-04-28 ENCOUNTER — TELEPHONE (OUTPATIENT)
Dept: GASTROENTEROLOGY | Facility: AMBULARY SURGERY CENTER | Age: 70
End: 2020-04-28

## 2020-05-11 ENCOUNTER — APPOINTMENT (EMERGENCY)
Dept: RADIOLOGY | Facility: HOSPITAL | Age: 70
End: 2020-05-11
Payer: COMMERCIAL

## 2020-05-11 ENCOUNTER — HOSPITAL ENCOUNTER (EMERGENCY)
Facility: HOSPITAL | Age: 70
Discharge: HOME | End: 2020-05-11
Attending: EMERGENCY MEDICINE
Payer: COMMERCIAL

## 2020-05-11 VITALS
RESPIRATION RATE: 18 BRPM | SYSTOLIC BLOOD PRESSURE: 155 MMHG | HEART RATE: 74 BPM | DIASTOLIC BLOOD PRESSURE: 86 MMHG | BODY MASS INDEX: 33.01 KG/M2 | OXYGEN SATURATION: 97 % | TEMPERATURE: 98.6 F | WEIGHT: 169 LBS

## 2020-05-11 DIAGNOSIS — S93.602A SPRAIN OF LEFT FOOT, INITIAL ENCOUNTER: Primary | ICD-10-CM

## 2020-05-11 PROCEDURE — 99284 EMERGENCY DEPT VISIT MOD MDM: CPT | Performed by: EMERGENCY MEDICINE

## 2020-05-11 PROCEDURE — 73610 X-RAY EXAM OF ANKLE: CPT

## 2020-05-11 PROCEDURE — 99283 EMERGENCY DEPT VISIT LOW MDM: CPT

## 2020-05-11 PROCEDURE — 73630 X-RAY EXAM OF FOOT: CPT

## 2020-05-11 RX ORDER — OXYCODONE HYDROCHLORIDE AND ACETAMINOPHEN 5; 325 MG/1; MG/1
1 TABLET ORAL ONCE
Status: COMPLETED | OUTPATIENT
Start: 2020-05-11 | End: 2020-05-11

## 2020-05-11 RX ORDER — OXYCODONE HYDROCHLORIDE AND ACETAMINOPHEN 5; 325 MG/1; MG/1
1 TABLET ORAL 3 TIMES DAILY
Qty: 15 TABLET | Refills: 0 | Status: SHIPPED | OUTPATIENT
Start: 2020-05-11 | End: 2020-05-21

## 2020-05-11 RX ADMIN — OXYCODONE HYDROCHLORIDE AND ACETAMINOPHEN 1 TABLET: 5; 325 TABLET ORAL at 03:02

## 2020-06-09 DIAGNOSIS — E03.8 OTHER SPECIFIED HYPOTHYROIDISM: ICD-10-CM

## 2020-06-09 RX ORDER — LEVOTHYROXINE SODIUM 0.07 MG/1
TABLET ORAL
Qty: 90 TABLET | Refills: 1 | Status: SHIPPED | OUTPATIENT
Start: 2020-06-09 | End: 2021-06-01

## 2020-10-10 DIAGNOSIS — E78.2 MIXED HYPERLIPIDEMIA: ICD-10-CM

## 2020-10-13 RX ORDER — ATORVASTATIN CALCIUM 20 MG/1
TABLET, FILM COATED ORAL
Qty: 90 TABLET | Refills: 1 | Status: SHIPPED | OUTPATIENT
Start: 2020-10-13 | End: 2021-04-12

## 2020-10-28 RX ORDER — ALPRAZOLAM 0.25 MG/1
0.25 TABLET ORAL
COMMUNITY
Start: 2020-09-14 | End: 2020-12-08

## 2020-11-09 ENCOUNTER — APPOINTMENT (OUTPATIENT)
Dept: RADIOLOGY | Facility: CLINIC | Age: 70
End: 2020-11-09
Payer: COMMERCIAL

## 2020-11-09 ENCOUNTER — OFFICE VISIT (OUTPATIENT)
Dept: FAMILY MEDICINE CLINIC | Facility: CLINIC | Age: 70
End: 2020-11-09
Payer: COMMERCIAL

## 2020-11-09 VITALS
WEIGHT: 168 LBS | HEIGHT: 60 IN | SYSTOLIC BLOOD PRESSURE: 140 MMHG | HEART RATE: 71 BPM | OXYGEN SATURATION: 97 % | BODY MASS INDEX: 32.98 KG/M2 | DIASTOLIC BLOOD PRESSURE: 70 MMHG | TEMPERATURE: 99.6 F | RESPIRATION RATE: 16 BRPM

## 2020-11-09 DIAGNOSIS — I42.9 CARDIOMYOPATHY, UNSPECIFIED TYPE (HCC): ICD-10-CM

## 2020-11-09 DIAGNOSIS — J01.00 SUBACUTE MAXILLARY SINUSITIS: ICD-10-CM

## 2020-11-09 DIAGNOSIS — Z12.31 ENCOUNTER FOR SCREENING MAMMOGRAM FOR BREAST CANCER: ICD-10-CM

## 2020-11-09 DIAGNOSIS — Z00.00 MEDICARE ANNUAL WELLNESS VISIT, SUBSEQUENT: Primary | ICD-10-CM

## 2020-11-09 DIAGNOSIS — M25.512 ACUTE PAIN OF LEFT SHOULDER: ICD-10-CM

## 2020-11-09 DIAGNOSIS — E03.8 OTHER SPECIFIED HYPOTHYROIDISM: ICD-10-CM

## 2020-11-09 DIAGNOSIS — Z11.59 NEED FOR HEPATITIS C SCREENING TEST: ICD-10-CM

## 2020-11-09 PROBLEM — N39.0 ACUTE LOWER UTI: Status: RESOLVED | Noted: 2019-11-19 | Resolved: 2020-11-09

## 2020-11-09 PROCEDURE — 99214 OFFICE O/P EST MOD 30 MIN: CPT | Performed by: FAMILY MEDICINE

## 2020-11-09 PROCEDURE — 1036F TOBACCO NON-USER: CPT | Performed by: FAMILY MEDICINE

## 2020-11-09 PROCEDURE — 3078F DIAST BP <80 MM HG: CPT | Performed by: FAMILY MEDICINE

## 2020-11-09 PROCEDURE — 73030 X-RAY EXAM OF SHOULDER: CPT

## 2020-11-09 PROCEDURE — G0439 PPPS, SUBSEQ VISIT: HCPCS | Performed by: FAMILY MEDICINE

## 2020-11-09 PROCEDURE — 3725F SCREEN DEPRESSION PERFORMED: CPT | Performed by: FAMILY MEDICINE

## 2020-11-09 PROCEDURE — 1101F PT FALLS ASSESS-DOCD LE1/YR: CPT | Performed by: FAMILY MEDICINE

## 2020-11-09 PROCEDURE — 1125F AMNT PAIN NOTED PAIN PRSNT: CPT | Performed by: FAMILY MEDICINE

## 2020-11-09 PROCEDURE — 3008F BODY MASS INDEX DOCD: CPT | Performed by: FAMILY MEDICINE

## 2020-11-09 PROCEDURE — 1160F RVW MEDS BY RX/DR IN RCRD: CPT | Performed by: FAMILY MEDICINE

## 2020-11-09 PROCEDURE — 1170F FXNL STATUS ASSESSED: CPT | Performed by: FAMILY MEDICINE

## 2020-11-09 PROCEDURE — 3077F SYST BP >= 140 MM HG: CPT | Performed by: FAMILY MEDICINE

## 2020-11-09 PROCEDURE — 3288F FALL RISK ASSESSMENT DOCD: CPT | Performed by: FAMILY MEDICINE

## 2020-11-09 RX ORDER — SACUBITRIL AND VALSARTAN 24; 26 MG/1; MG/1
1 TABLET, FILM COATED ORAL 2 TIMES DAILY
COMMUNITY
Start: 2020-11-05 | End: 2021-06-16 | Stop reason: ALTCHOICE

## 2020-11-09 RX ORDER — AMOXICILLIN AND CLAVULANATE POTASSIUM 875; 125 MG/1; MG/1
1 TABLET, FILM COATED ORAL EVERY 12 HOURS SCHEDULED
Qty: 14 TABLET | Refills: 0 | Status: SHIPPED | OUTPATIENT
Start: 2020-11-09 | End: 2020-11-16

## 2020-11-09 RX ORDER — FLUTICASONE PROPIONATE 50 MCG
2 SPRAY, SUSPENSION (ML) NASAL DAILY
Qty: 16 G | Refills: 1 | Status: SHIPPED | OUTPATIENT
Start: 2020-11-09 | End: 2021-10-04 | Stop reason: SDUPTHER

## 2020-11-12 LAB
HCV AB S/CO SERPL IA: 0.02
HCV AB SERPL QL IA: NORMAL

## 2020-12-08 ENCOUNTER — TELEMEDICINE (OUTPATIENT)
Dept: FAMILY MEDICINE CLINIC | Facility: CLINIC | Age: 70
End: 2020-12-08
Payer: COMMERCIAL

## 2020-12-08 VITALS — TEMPERATURE: 100 F

## 2020-12-08 DIAGNOSIS — B34.9 VIRAL INFECTION, UNSPECIFIED: Primary | ICD-10-CM

## 2020-12-08 PROCEDURE — G2012 BRIEF CHECK IN BY MD/QHP: HCPCS | Performed by: FAMILY MEDICINE

## 2021-01-21 ENCOUNTER — OFFICE VISIT (OUTPATIENT)
Dept: FAMILY MEDICINE CLINIC | Facility: CLINIC | Age: 71
End: 2021-01-21
Payer: COMMERCIAL

## 2021-01-21 VITALS
HEART RATE: 63 BPM | SYSTOLIC BLOOD PRESSURE: 110 MMHG | HEIGHT: 60 IN | RESPIRATION RATE: 16 BRPM | WEIGHT: 168 LBS | DIASTOLIC BLOOD PRESSURE: 68 MMHG | OXYGEN SATURATION: 95 % | BODY MASS INDEX: 32.98 KG/M2

## 2021-01-21 DIAGNOSIS — I42.9 CARDIOMYOPATHY, UNSPECIFIED TYPE (HCC): ICD-10-CM

## 2021-01-21 DIAGNOSIS — D17.21 LIPOMA OF RIGHT UPPER EXTREMITY: Primary | ICD-10-CM

## 2021-01-21 DIAGNOSIS — I10 BENIGN ESSENTIAL HYPERTENSION: ICD-10-CM

## 2021-01-21 DIAGNOSIS — I42.0 DILATED CARDIOMYOPATHY (HCC): ICD-10-CM

## 2021-01-21 PROCEDURE — 1036F TOBACCO NON-USER: CPT | Performed by: FAMILY MEDICINE

## 2021-01-21 PROCEDURE — 1101F PT FALLS ASSESS-DOCD LE1/YR: CPT | Performed by: FAMILY MEDICINE

## 2021-01-21 PROCEDURE — 3725F SCREEN DEPRESSION PERFORMED: CPT | Performed by: FAMILY MEDICINE

## 2021-01-21 PROCEDURE — 99214 OFFICE O/P EST MOD 30 MIN: CPT | Performed by: FAMILY MEDICINE

## 2021-01-21 PROCEDURE — 1160F RVW MEDS BY RX/DR IN RCRD: CPT | Performed by: FAMILY MEDICINE

## 2021-01-21 PROCEDURE — 3288F FALL RISK ASSESSMENT DOCD: CPT | Performed by: FAMILY MEDICINE

## 2021-01-21 RX ORDER — CEFUROXIME AXETIL 250 MG/1
250 TABLET ORAL 2 TIMES DAILY
COMMUNITY
Start: 2020-12-25 | End: 2021-01-21

## 2021-01-21 RX ORDER — ALPRAZOLAM 0.25 MG/1
0.25 TABLET ORAL AS NEEDED
COMMUNITY
Start: 2020-12-10 | End: 2021-07-20 | Stop reason: SDUPTHER

## 2021-01-21 RX ORDER — CHLORAL HYDRATE 500 MG
1000 CAPSULE ORAL 2 TIMES DAILY
COMMUNITY
End: 2021-06-16 | Stop reason: SDUPTHER

## 2021-01-21 NOTE — PROGRESS NOTES
Assessment/Plan:    Problem List Items Addressed This Visit        Cardiovascular and Mediastinum    Benign essential hypertension    Cardiomyopathy ([address] )       Other    Lipoma of right upper extremity - Primary    Relevant Orders    US extremity soft tissue    Ambulatory referral to General Surgery          Chief Complaint   Patient presents with    Mass       Subjective:   Patient ID: Gemma Flores is a 72 y o  female  noted a lump on upper rt arm in last few days , no pain , normal range of motion   Cardiomyopathy stable , follows cardiologist         Review of Systems   Constitutional: Negative  Negative for activity change, chills, fatigue and fever  HENT: Negative  Eyes: Negative  Respiratory: Negative  Gastrointestinal: Negative  Musculoskeletal: Negative for arthralgias, back pain, gait problem, joint swelling and neck pain  Neurological: Negative  Psychiatric/Behavioral: Negative  Objective:  Physical Exam  Vitals signs and nursing note reviewed  Constitutional:       Appearance: She is well-developed  HENT:      Head: Normocephalic and atraumatic  Right Ear: External ear normal       Left Ear: External ear normal    Eyes:      General: No scleral icterus  Conjunctiva/sclera: Conjunctivae normal       Pupils: Pupils are equal, round, and reactive to light  Neck:      Musculoskeletal: Normal range of motion and neck supple  Thyroid: No thyromegaly  Cardiovascular:      Rate and Rhythm: Normal rate and regular rhythm  Heart sounds: Normal heart sounds  Pulmonary:      Effort: Pulmonary effort is normal       Breath sounds: Normal breath sounds  No wheezing or rales  Musculoskeletal:      Comments: About 8 x 10 cm soft mass on upper rt shoulder extending to rt chest wall, non tender    Lymphadenopathy:      Cervical: No cervical adenopathy  Skin:     Findings: No erythema or rash  Neurological:      Mental Status: She is alert              Past Surgical History:   Procedure Laterality Date    CARDIAC PACEMAKER PLACEMENT         Family History   Adopted: Yes   Problem Relation Age of Onset    No Known Problems Mother     Thyroid cancer Sister          Current Outpatient Medications:     ALPRAZolam (XANAX) 0 25 mg tablet, Take 0 25 mg by mouth daily at bedtime as needed, Disp: , Rfl:     ALPRAZolam (XANAX) 1 mg tablet, , Disp: , Rfl:     atorvastatin (LIPITOR) 20 mg tablet, take 1 tablet by mouth daily, Disp: 90 tablet, Rfl: 1    carvedilol (COREG) 12 5 mg tablet, , Disp: , Rfl:     Cholecalciferol (VITAMIN D) 2000 units CAPS, Take by mouth, Disp: , Rfl:     escitalopram (LEXAPRO) 20 mg tablet, Take 1 tablet by mouth daily, Disp: , Rfl: 0    fluticasone (FLONASE) 50 mcg/act nasal spray, 2 sprays into each nostril daily, Disp: 16 g, Rfl: 1    levothyroxine 75 mcg tablet, take 1 tablet by mouth every morning, Disp: 90 tablet, Rfl: 1    Omega-3 1000 MG CAPS, Take by mouth, Disp: , Rfl:     sacubitril-valsartan (Entresto) 24-26 MG TABS, Take 1 tablet by mouth 2 (two) times a day, Disp: , Rfl:     traZODone (DESYREL) 150 mg tablet, Take 150 mg by mouth daily , Disp: , Rfl:     No Known Allergies    Vitals:    01/21/21 1127   BP: 110/68   Pulse: 63   Resp: 16   SpO2: 95%   Weight: 76 2 kg (168 lb)   Height: 5' (1 524 m)

## 2021-01-25 ENCOUNTER — CONSULT (OUTPATIENT)
Dept: SURGERY | Facility: CLINIC | Age: 71
End: 2021-01-25
Payer: COMMERCIAL

## 2021-01-25 VITALS
HEIGHT: 60 IN | BODY MASS INDEX: 32.98 KG/M2 | HEART RATE: 66 BPM | DIASTOLIC BLOOD PRESSURE: 66 MMHG | SYSTOLIC BLOOD PRESSURE: 100 MMHG | TEMPERATURE: 97.6 F | WEIGHT: 168 LBS

## 2021-01-25 DIAGNOSIS — Z01.818 ENCOUNTER FOR PREADMISSION TESTING: ICD-10-CM

## 2021-01-25 DIAGNOSIS — D17.21 LIPOMA OF RIGHT UPPER EXTREMITY: Primary | ICD-10-CM

## 2021-01-25 DIAGNOSIS — D17.21 LIPOMA OF RIGHT UPPER EXTREMITY: ICD-10-CM

## 2021-01-25 PROCEDURE — 3008F BODY MASS INDEX DOCD: CPT | Performed by: FAMILY MEDICINE

## 2021-01-25 PROCEDURE — 1160F RVW MEDS BY RX/DR IN RCRD: CPT | Performed by: SURGERY

## 2021-01-25 PROCEDURE — 99203 OFFICE O/P NEW LOW 30 MIN: CPT | Performed by: SURGERY

## 2021-01-25 NOTE — H&P
Assessment/Plan:  Patient has a subcutaneous mass over her right shoulder  I offered the excision of the mass  This would be done under IV sedation and local anesthesia at [facility] on February 11, 2021  She signed the consent for the procedure  No problem-specific Assessment & Plan notes found for this encounter  Diagnoses and all orders for this visit:    Lipoma of right upper extremity  -     Ambulatory referral to General Surgery          Subjective:      Patient ID: Jas Elizondo is a 72 y o  female  71-year-old female patient came to my office with complaints of a lump over her right shoulder  She observe did 2 weeks ago  No pain in the lump  She says that the she is having some discomfort  No drainage  The following portions of the patient's history were reviewed and updated as appropriate:   She  has a past medical history of Herpes zoster, Hypothyroidism, and Vertigo  She   Patient Active Problem List    Diagnosis Date Noted    Lipoma of right upper extremity 01/21/2021    Acute pain of left shoulder 11/09/2020    Subacute maxillary sinusitis 11/09/2020    Positive FIT (fecal immunochemical test) 02/27/2020    Encounter for screening mammogram for breast cancer 10/30/2019    Menopause 10/30/2019    Need for hepatitis C screening test 10/08/2019    Needs flu shot 10/30/2018    Lumbar radiculopathy 04/13/2017    Anxiety 09/01/2016    Allergic rhinitis 01/11/2016    Cardiomyopathy ([facility] [address] ) 05/01/2014    Benign essential hypertension 09/04/2012    Hypercholesterolemia 09/04/2012    Other specified hypothyroidism 09/04/2012     She  has a past surgical history that includes Cardiac pacemaker placement (08/22/2012)  Her family history includes Cancer in her brother, mother, and sister; Thyroid cancer in her sister  She was adopted  She  reports that she has never smoked   She has never used smokeless tobacco  She reports that she does not drink alcohol or use drugs   Current Outpatient Medications   Medication Sig Dispense Refill    ALPRAZolam (XANAX) 0 25 mg tablet Take 0 25 mg by mouth daily at bedtime as needed      ALPRAZolam (XANAX) 1 mg tablet       atorvastatin (LIPITOR) 20 mg tablet take 1 tablet by mouth daily 90 tablet 1    carvedilol (COREG) 12 5 mg tablet       escitalopram (LEXAPRO) 20 mg tablet Take 1 tablet by mouth daily  0    fluticasone (FLONASE) 50 mcg/act nasal spray 2 sprays into each nostril daily 16 g 1    levothyroxine 75 mcg tablet take 1 tablet by mouth every morning 90 tablet 1    Omega-3 1000 MG CAPS Take by mouth      sacubitril-valsartan (Entresto) 24-26 MG TABS Take 1 tablet by mouth 2 (two) times a day      traZODone (DESYREL) 150 mg tablet Take 150 mg by mouth daily       Cholecalciferol (VITAMIN D) 2000 units CAPS Take by mouth       No current facility-administered medications for this visit  Current Outpatient Medications on File Prior to Visit   Medication Sig    ALPRAZolam (XANAX) 0 25 mg tablet Take 0 25 mg by mouth daily at bedtime as needed    ALPRAZolam (XANAX) 1 mg tablet     atorvastatin (LIPITOR) 20 mg tablet take 1 tablet by mouth daily    carvedilol (COREG) 12 5 mg tablet     escitalopram (LEXAPRO) 20 mg tablet Take 1 tablet by mouth daily    fluticasone (FLONASE) 50 mcg/act nasal spray 2 sprays into each nostril daily    levothyroxine 75 mcg tablet take 1 tablet by mouth every morning    Omega-3 1000 MG CAPS Take by mouth    sacubitril-valsartan (Entresto) 24-26 MG TABS Take 1 tablet by mouth 2 (two) times a day    traZODone (DESYREL) 150 mg tablet Take 150 mg by mouth daily     Cholecalciferol (VITAMIN D) 2000 units CAPS Take by mouth     No current facility-administered medications on file prior to visit  She is allergic to amoxicillin       Review of Systems   Constitutional: Negative  HENT: Negative  Eyes: Negative  Respiratory: Negative  Cardiovascular: Negative  Gastrointestinal: Negative  Endocrine: Negative  Genitourinary: Negative  Musculoskeletal: Negative  Skin: Negative  Allergic/Immunologic: Negative  Neurological: Negative  Hematological: Negative  Psychiatric/Behavioral: Negative  Objective:      /66   Pulse 66   Temp 97 6 °F (36 4 °C)   Ht 5' (1 524 m)   Wt 76 2 kg (168 lb)   BMI 32 81 kg/m²          Physical Exam  Constitutional:       Appearance: She is obese  HENT:      Head: Normocephalic and atraumatic  Nose: Nose normal       Mouth/Throat:      Mouth: Mucous membranes are moist    Eyes:      Pupils: Pupils are equal, round, and reactive to light  Neck:      Musculoskeletal: Normal range of motion  Cardiovascular:      Rate and Rhythm: Normal rate and regular rhythm  Pulses: Normal pulses  Heart sounds: Normal heart sounds  Pulmonary:      Effort: Pulmonary effort is normal       Breath sounds: Normal breath sounds  Chest:       Musculoskeletal: Normal range of motion  Skin:     General: Skin is warm  Neurological:      General: No focal deficit present  Mental Status: She is alert and oriented to person, place, and time     Psychiatric:         Mood and Affect: Mood normal          Behavior: Behavior normal

## 2021-01-25 NOTE — PROGRESS NOTES
Assessment/Plan:  Patient has a subcutaneous mass over her right shoulder  I offered the excision of the mass  This would be done under IV sedation and local anesthesia at [facility] on February 11, 2021  She signed the consent for the procedure  No problem-specific Assessment & Plan notes found for this encounter  Diagnoses and all orders for this visit:    Lipoma of right upper extremity  -     Ambulatory referral to General Surgery          Subjective:      Patient ID: Jayesh Mason is a 72 y o  female  52-year-old female patient came to my office with complaints of a lump over her right shoulder  She observe did 2 weeks ago  No pain in the lump  She says that the she is having some discomfort  No drainage  The following portions of the patient's history were reviewed and updated as appropriate:   She  has a past medical history of Herpes zoster, Hypothyroidism, and Vertigo  She   Patient Active Problem List    Diagnosis Date Noted    Lipoma of right upper extremity 01/21/2021    Acute pain of left shoulder 11/09/2020    Subacute maxillary sinusitis 11/09/2020    Positive FIT (fecal immunochemical test) 02/27/2020    Encounter for screening mammogram for breast cancer 10/30/2019    Menopause 10/30/2019    Need for hepatitis C screening test 10/08/2019    Needs flu shot 10/30/2018    Lumbar radiculopathy 04/13/2017    Anxiety 09/01/2016    Allergic rhinitis 01/11/2016    Cardiomyopathy [address] [address] ) 05/01/2014    Benign essential hypertension 09/04/2012    Hypercholesterolemia 09/04/2012    Other specified hypothyroidism 09/04/2012     She  has a past surgical history that includes Cardiac pacemaker placement (08/22/2012)  Her family history includes Cancer in her brother, mother, and sister; Thyroid cancer in her sister  She was adopted  She  reports that she has never smoked   She has never used smokeless tobacco  She reports that she does not drink alcohol or use drugs   Current Outpatient Medications   Medication Sig Dispense Refill    ALPRAZolam (XANAX) 0 25 mg tablet Take 0 25 mg by mouth daily at bedtime as needed      ALPRAZolam (XANAX) 1 mg tablet       atorvastatin (LIPITOR) 20 mg tablet take 1 tablet by mouth daily 90 tablet 1    carvedilol (COREG) 12 5 mg tablet       escitalopram (LEXAPRO) 20 mg tablet Take 1 tablet by mouth daily  0    fluticasone (FLONASE) 50 mcg/act nasal spray 2 sprays into each nostril daily 16 g 1    levothyroxine 75 mcg tablet take 1 tablet by mouth every morning 90 tablet 1    Omega-3 1000 MG CAPS Take by mouth      sacubitril-valsartan (Entresto) 24-26 MG TABS Take 1 tablet by mouth 2 (two) times a day      traZODone (DESYREL) 150 mg tablet Take 150 mg by mouth daily       Cholecalciferol (VITAMIN D) 2000 units CAPS Take by mouth       No current facility-administered medications for this visit  Current Outpatient Medications on File Prior to Visit   Medication Sig    ALPRAZolam (XANAX) 0 25 mg tablet Take 0 25 mg by mouth daily at bedtime as needed    ALPRAZolam (XANAX) 1 mg tablet     atorvastatin (LIPITOR) 20 mg tablet take 1 tablet by mouth daily    carvedilol (COREG) 12 5 mg tablet     escitalopram (LEXAPRO) 20 mg tablet Take 1 tablet by mouth daily    fluticasone (FLONASE) 50 mcg/act nasal spray 2 sprays into each nostril daily    levothyroxine 75 mcg tablet take 1 tablet by mouth every morning    Omega-3 1000 MG CAPS Take by mouth    sacubitril-valsartan (Entresto) 24-26 MG TABS Take 1 tablet by mouth 2 (two) times a day    traZODone (DESYREL) 150 mg tablet Take 150 mg by mouth daily     Cholecalciferol (VITAMIN D) 2000 units CAPS Take by mouth     No current facility-administered medications on file prior to visit  She is allergic to amoxicillin       Review of Systems   Constitutional: Negative  HENT: Negative  Eyes: Negative  Respiratory: Negative  Cardiovascular: Negative  Gastrointestinal: Negative  Endocrine: Negative  Genitourinary: Negative  Musculoskeletal: Negative  Skin: Negative  Allergic/Immunologic: Negative  Neurological: Negative  Hematological: Negative  Psychiatric/Behavioral: Negative  Objective:      /66   Pulse 66   Temp 97 6 °F (36 4 °C)   Ht 5' (1 524 m)   Wt 76 2 kg (168 lb)   BMI 32 81 kg/m²          Physical Exam  Constitutional:       Appearance: She is obese  HENT:      Head: Normocephalic and atraumatic  Nose: Nose normal       Mouth/Throat:      Mouth: Mucous membranes are moist    Eyes:      Pupils: Pupils are equal, round, and reactive to light  Neck:      Musculoskeletal: Normal range of motion  Cardiovascular:      Rate and Rhythm: Normal rate and regular rhythm  Pulses: Normal pulses  Heart sounds: Normal heart sounds  Pulmonary:      Effort: Pulmonary effort is normal       Breath sounds: Normal breath sounds  Chest:       Musculoskeletal: Normal range of motion  Skin:     General: Skin is warm  Neurological:      General: No focal deficit present  Mental Status: She is alert and oriented to person, place, and time     Psychiatric:         Mood and Affect: Mood normal          Behavior: Behavior normal

## 2021-01-25 NOTE — H&P (VIEW-ONLY)
Assessment/Plan:  Patient has a subcutaneous mass over her right shoulder  I offered the excision of the mass  This would be done under IV sedation and local anesthesia at [facility] on February 11, 2021  She signed the consent for the procedure  No problem-specific Assessment & Plan notes found for this encounter  Diagnoses and all orders for this visit:    Lipoma of right upper extremity  -     Ambulatory referral to General Surgery          Subjective:      Patient ID: Melo Christine is a 72 y o  female  28-year-old female patient came to my office with complaints of a lump over her right shoulder  She observe did 2 weeks ago  No pain in the lump  She says that the she is having some discomfort  No drainage  The following portions of the patient's history were reviewed and updated as appropriate:   She  has a past medical history of Herpes zoster, Hypothyroidism, and Vertigo  She   Patient Active Problem List    Diagnosis Date Noted    Lipoma of right upper extremity 01/21/2021    Acute pain of left shoulder 11/09/2020    Subacute maxillary sinusitis 11/09/2020    Positive FIT (fecal immunochemical test) 02/27/2020    Encounter for screening mammogram for breast cancer 10/30/2019    Menopause 10/30/2019    Need for hepatitis C screening test 10/08/2019    Needs flu shot 10/30/2018    Lumbar radiculopathy 04/13/2017    Anxiety 09/01/2016    Allergic rhinitis 01/11/2016    Cardiomyopathy [address] [address] ) 05/01/2014    Benign essential hypertension 09/04/2012    Hypercholesterolemia 09/04/2012    Other specified hypothyroidism 09/04/2012     She  has a past surgical history that includes Cardiac pacemaker placement (08/22/2012)  Her family history includes Cancer in her brother, mother, and sister; Thyroid cancer in her sister  She was adopted  She  reports that she has never smoked   She has never used smokeless tobacco  She reports that she does not drink alcohol or use drugs   Current Outpatient Medications   Medication Sig Dispense Refill    ALPRAZolam (XANAX) 0 25 mg tablet Take 0 25 mg by mouth daily at bedtime as needed      ALPRAZolam (XANAX) 1 mg tablet       atorvastatin (LIPITOR) 20 mg tablet take 1 tablet by mouth daily 90 tablet 1    carvedilol (COREG) 12 5 mg tablet       escitalopram (LEXAPRO) 20 mg tablet Take 1 tablet by mouth daily  0    fluticasone (FLONASE) 50 mcg/act nasal spray 2 sprays into each nostril daily 16 g 1    levothyroxine 75 mcg tablet take 1 tablet by mouth every morning 90 tablet 1    Omega-3 1000 MG CAPS Take by mouth      sacubitril-valsartan (Entresto) 24-26 MG TABS Take 1 tablet by mouth 2 (two) times a day      traZODone (DESYREL) 150 mg tablet Take 150 mg by mouth daily       Cholecalciferol (VITAMIN D) 2000 units CAPS Take by mouth       No current facility-administered medications for this visit  Current Outpatient Medications on File Prior to Visit   Medication Sig    ALPRAZolam (XANAX) 0 25 mg tablet Take 0 25 mg by mouth daily at bedtime as needed    ALPRAZolam (XANAX) 1 mg tablet     atorvastatin (LIPITOR) 20 mg tablet take 1 tablet by mouth daily    carvedilol (COREG) 12 5 mg tablet     escitalopram (LEXAPRO) 20 mg tablet Take 1 tablet by mouth daily    fluticasone (FLONASE) 50 mcg/act nasal spray 2 sprays into each nostril daily    levothyroxine 75 mcg tablet take 1 tablet by mouth every morning    Omega-3 1000 MG CAPS Take by mouth    sacubitril-valsartan (Entresto) 24-26 MG TABS Take 1 tablet by mouth 2 (two) times a day    traZODone (DESYREL) 150 mg tablet Take 150 mg by mouth daily     Cholecalciferol (VITAMIN D) 2000 units CAPS Take by mouth     No current facility-administered medications on file prior to visit  She is allergic to amoxicillin       Review of Systems   Constitutional: Negative  HENT: Negative  Eyes: Negative  Respiratory: Negative  Cardiovascular: Negative  Gastrointestinal: Negative  Endocrine: Negative  Genitourinary: Negative  Musculoskeletal: Negative  Skin: Negative  Allergic/Immunologic: Negative  Neurological: Negative  Hematological: Negative  Psychiatric/Behavioral: Negative  Objective:      /66   Pulse 66   Temp 97 6 °F (36 4 °C)   Ht 5' (1 524 m)   Wt 76 2 kg (168 lb)   BMI 32 81 kg/m²          Physical Exam  Constitutional:       Appearance: She is obese  HENT:      Head: Normocephalic and atraumatic  Nose: Nose normal       Mouth/Throat:      Mouth: Mucous membranes are moist    Eyes:      Pupils: Pupils are equal, round, and reactive to light  Neck:      Musculoskeletal: Normal range of motion  Cardiovascular:      Rate and Rhythm: Normal rate and regular rhythm  Pulses: Normal pulses  Heart sounds: Normal heart sounds  Pulmonary:      Effort: Pulmonary effort is normal       Breath sounds: Normal breath sounds  Chest:       Musculoskeletal: Normal range of motion  Skin:     General: Skin is warm  Neurological:      General: No focal deficit present  Mental Status: She is alert and oriented to person, place, and time     Psychiatric:         Mood and Affect: Mood normal          Behavior: Behavior normal

## 2021-01-28 ENCOUNTER — HOSPITAL ENCOUNTER (OUTPATIENT)
Dept: ULTRASOUND IMAGING | Facility: HOSPITAL | Age: 71
Discharge: HOME | End: 2021-01-28
Attending: FAMILY MEDICINE
Payer: COMMERCIAL

## 2021-01-28 DIAGNOSIS — D17.21 LIPOMA OF RIGHT UPPER EXTREMITY: ICD-10-CM

## 2021-01-28 PROCEDURE — 76882 US LMTD JT/FCL EVL NVASC XTR: CPT

## 2021-02-03 ENCOUNTER — TELEPHONE (OUTPATIENT)
Dept: FAMILY MEDICINE CLINIC | Facility: CLINIC | Age: 71
End: 2021-02-03

## 2021-02-03 NOTE — TELEPHONE ENCOUNTER
----- [facility] [place] sent at 2/3/2021 11:58 AM EST -----  Please let the patient know that her ultrasound showed a mass that looks like a lipoma (fatty tissue)  Patient already has the procedure scheduled to remove it with the surgeon

## 2021-02-08 ENCOUNTER — OFFICE VISIT (OUTPATIENT)
Dept: LAB | Facility: HOSPITAL | Age: 71
End: 2021-02-08
Attending: SURGERY
Payer: COMMERCIAL

## 2021-02-08 ENCOUNTER — APPOINTMENT (OUTPATIENT)
Dept: LAB | Facility: HOSPITAL | Age: 71
End: 2021-02-08
Attending: SURGERY
Payer: COMMERCIAL

## 2021-02-08 DIAGNOSIS — Z01.818 ENCOUNTER FOR PREADMISSION TESTING: ICD-10-CM

## 2021-02-08 DIAGNOSIS — D17.21 LIPOMA OF RIGHT UPPER EXTREMITY: ICD-10-CM

## 2021-02-08 LAB
ALBUMIN SERPL BCP-MCNC: 4 G/DL (ref 3.4–4.8)
ALP SERPL-CCNC: 67.2 U/L (ref 35–140)
ALT SERPL W P-5'-P-CCNC: 19 U/L (ref 5–54)
ANION GAP SERPL CALCULATED.3IONS-SCNC: 7 MMOL/L (ref 4–13)
AST SERPL W P-5'-P-CCNC: 26 U/L (ref 15–41)
BASOPHILS # BLD AUTO: 0.03 THOUSANDS/ΜL (ref 0–0.1)
BASOPHILS NFR BLD AUTO: 1 % (ref 0–1)
BILIRUB SERPL-MCNC: 0.59 MG/DL (ref 0.3–1.2)
BUN SERPL-MCNC: 13 MG/DL (ref 6–20)
CALCIUM SERPL-MCNC: 8.9 MG/DL (ref 8.4–10.2)
CHLORIDE SERPL-SCNC: 106 MMOL/L (ref 96–108)
CO2 SERPL-SCNC: 27 MMOL/L (ref 22–33)
CREAT SERPL-MCNC: 0.87 MG/DL (ref 0.4–1.1)
EOSINOPHIL # BLD AUTO: 0.1 THOUSAND/ΜL (ref 0–0.61)
EOSINOPHIL NFR BLD AUTO: 2 % (ref 0–6)
ERYTHROCYTE [DISTWIDTH] IN BLOOD BY AUTOMATED COUNT: 15.7 % (ref 11.6–15.1)
GFR SERPL CREATININE-BSD FRML MDRD: 68 ML/MIN/1.73SQ M
GLUCOSE SERPL-MCNC: 90 MG/DL (ref 65–140)
HCT VFR BLD AUTO: 43.8 % (ref 34.8–46.1)
HGB BLD-MCNC: 13.5 G/DL (ref 11.5–15.4)
IMM GRANULOCYTES # BLD AUTO: 0.01 THOUSAND/UL (ref 0–0.2)
IMM GRANULOCYTES NFR BLD AUTO: 0 % (ref 0–2)
LYMPHOCYTES # BLD AUTO: 2.3 THOUSANDS/ΜL (ref 0.6–4.47)
LYMPHOCYTES NFR BLD AUTO: 37 % (ref 14–44)
MCH RBC QN AUTO: 25.6 PG (ref 26.8–34.3)
MCHC RBC AUTO-ENTMCNC: 30.8 G/DL (ref 31.4–37.4)
MCV RBC AUTO: 83 FL (ref 82–98)
MONOCYTES # BLD AUTO: 0.32 THOUSAND/ΜL (ref 0.17–1.22)
MONOCYTES NFR BLD AUTO: 5 % (ref 4–12)
NEUTROPHILS # BLD AUTO: 3.42 THOUSANDS/ΜL (ref 1.85–7.62)
NEUTS SEG NFR BLD AUTO: 55 % (ref 43–75)
PLATELET # BLD AUTO: 207 THOUSANDS/UL (ref 149–390)
PMV BLD AUTO: 9.5 FL (ref 8.9–12.7)
POTASSIUM SERPL-SCNC: 4.5 MMOL/L (ref 3.5–5)
PROT SERPL-MCNC: 6.8 G/DL (ref 6.4–8.3)
RBC # BLD AUTO: 5.28 MILLION/UL (ref 3.81–5.12)
SODIUM SERPL-SCNC: 140 MMOL/L (ref 133–145)
WBC # BLD AUTO: 6.18 THOUSAND/UL (ref 4.31–10.16)

## 2021-02-08 PROCEDURE — 36415 COLL VENOUS BLD VENIPUNCTURE: CPT

## 2021-02-08 PROCEDURE — 80053 COMPREHEN METABOLIC PANEL: CPT

## 2021-02-08 PROCEDURE — 85025 COMPLETE CBC W/AUTO DIFF WBC: CPT

## 2021-02-08 NOTE — PRE-PROCEDURE INSTRUCTIONS
Pre-Surgery Instructions:   Medication Instructions    ALPRAZolam (XANAX) 0 25 mg tablet May take morning of surgery with sip of water    atorvastatin (LIPITOR) 20 mg tablet May take with sip of water morning of surgery    carvedilol (COREG) 12 5 mg tablet Take morning of surgery with sip of water    escitalopram (LEXAPRO) 20 mg tablet May take morning of surgery with sip of water    fluticasone (FLONASE) 50 mcg/act nasal spray May use morning of surgery if needed    levothyroxine 75 mcg tablet May take with sip of water morning of surgery    Omega-3 1000 MG CAPS HOLD 7 days prior to surgery   sacubitril-valsartan (Entresto) 24-26 MG TABS HOLD morning of surgery  My Surgical Experience    The following information was developed to assist you to prepare for your operation  What do I need to do before coming to the hospital?   Arrange for a responsible person to drive you to and from the hospital    Arrange care for your children at home  Children are not allowed in the recovery areas of the hospital   Plan to wear clothing that is easy to put on and take off  If you are having shoulder surgery, wear a shirt that buttons or zippers in the front  Bathing  o Shower the evening before and the morning of your surgery with an antibacterial soap  Please refer to the Pre Op Showering Instructions for Surgery Patients Sheet   o Remove nail polish and all body piercing jewelry  o Do not shave any body part for at least 24 hours before surgery-this includes face, arms, legs and upper body  Food  o Nothing to eat or drink after midnight the night before your surgery   This includes candy and chewing gum  o Exception: If your surgery is after 12:00pm (noon), you may have clear liquids such as 7-Up®, ginger ale, apple or cranberry juice, Jell-O®, water, or clear broth until 8:00 am  o Do not drink milk or juice with pulp on the morning before surgery  o Do not drink alcohol 24 hours before surgery  Medicine  o Follow instructions you received from your surgeon about which medicines you may take on the day of surgery  o If instructed to take medicine on the morning of surgery, take pills with just a small sip of water  Call your prescribing doctor for specific infroamtion on what to do if you take insulin    What should I bring to the hospital?    Bring:  Brendia Stepan or a walker, if you have them, for foot or knee surgery   A list of the daily medicines, vitamins, minerals, herbals and nutritional supplements you take  Include the dosages of medicines and the time you take them each day   Glasses, dentures or hearing aids   Minimal clothing; you will be wearing hospital sleepwear   Photo ID; required to verify your identity   If you have a Living Will or Power of , bring a copy of the documents   If you have an ostomy, bring an extra pouch and any supplies you use    Do not bring   Medicines or inhalers   Money, valuables or jewelry    What other information should I know about the day of surgery?  Notify your surgeons if you develop a cold, sore throat, cough, fever, rash or any other illness   Report to the Ambulatory Surgical/Same Day Surgery Unit   You will be instructed to stop at Registration only if you have not been pre-registered   Inform your  fi they do not stay that they will be asked by the staff to leave a phone number where they can be reached   Be available to be reached before surgery  In the event the operating room schedule changes, you may be asked to come in earlier or later than expected    *It is important to tell your doctor and others involved in your health care if you are taking or have been taking any non-prescription drugs, vitamins, minerals, herbals or other nutritional supplements  Any of these may interact with some food or medicines and cause a reaction    Reviewed all medications and instructions of medications with patient    Reviewed all showering and COVID visitation policies  Pt is aware of needing transport to and from hospital   Answered all pt questions at this time, pt did verbalize understanding of all instructions discussed

## 2021-02-10 ENCOUNTER — ANESTHESIA EVENT (OUTPATIENT)
Dept: PERIOP | Facility: HOSPITAL | Age: 71
End: 2021-02-10
Payer: COMMERCIAL

## 2021-02-10 NOTE — ANESTHESIA PREPROCEDURE EVALUATION
Procedure:  EXCISION BIOPSY TISSUE LESION/MASS UPPER EXTREMITY (Right Shoulder)    No ECHOs on file, but per outside records:    [address]  - EF 33%, LVH, diastolic dysfunction, moderate TR, PASP 45  ICD placed 2007    Relevant Problems   ANESTHESIA   (+) PONV (postoperative nausea and vomiting)      CARDIO   (+) Benign essential hypertension   (+) Hypercholesterolemia      ENDO   (+) Other specified hypothyroidism      NEURO/PSYCH   (+) Anxiety      Other   (+) Cardiomyopathy ([address] )        Physical Exam    Airway    Mallampati score: I  TM Distance: >3 FB  Neck ROM: full     Dental   upper dentures and lower dentures,     Cardiovascular  Rhythm: regular, Rate: normal, Cardiovascular exam normal    Pulmonary  Pulmonary exam normal Breath sounds clear to auscultation,     Other Findings        Anesthesia Plan  ASA Score- 3     Anesthesia Type- IV sedation with anesthesia with ASA Monitors  Additional Monitors:   Airway Plan:     Comment: Discussed risks/benefits, including medication reactions, awareness, aspiration, and serious/life threatening complications  Plan to maintain native airway with IVGA, monitored with EtCO2  Plan Factors-Exercise tolerance (METS): <4 METS  Patient summary reviewed  Patient instructed to abstain from smoking on day of procedure  Patient did not smoke on day of surgery  Induction- intravenous  Postoperative Plan-     Informed Consent- Anesthetic plan and risks discussed with patient  I personally reviewed this patient with the CRNA  Discussed and agreed on the Anesthesia Plan with the CRNA  Arsalan Mckeon

## 2021-02-11 ENCOUNTER — ANESTHESIA (OUTPATIENT)
Dept: PERIOP | Facility: HOSPITAL | Age: 71
End: 2021-02-11
Payer: COMMERCIAL

## 2021-02-11 ENCOUNTER — HOSPITAL ENCOUNTER (OUTPATIENT)
Facility: HOSPITAL | Age: 71
Setting detail: OUTPATIENT SURGERY
Discharge: HOME | End: 2021-02-11
Attending: SURGERY | Admitting: SURGERY
Payer: COMMERCIAL

## 2021-02-11 VITALS
DIASTOLIC BLOOD PRESSURE: 62 MMHG | HEART RATE: 74 BPM | SYSTOLIC BLOOD PRESSURE: 138 MMHG | WEIGHT: 167 LBS | OXYGEN SATURATION: 96 % | RESPIRATION RATE: 18 BRPM | TEMPERATURE: 98 F | HEIGHT: 60 IN | BODY MASS INDEX: 32.79 KG/M2

## 2021-02-11 VITALS — HEART RATE: 80 BPM

## 2021-02-11 DIAGNOSIS — D17.21 LIPOMA OF RIGHT UPPER EXTREMITY: Primary | ICD-10-CM

## 2021-02-11 PROCEDURE — 88304 TISSUE EXAM BY PATHOLOGIST: CPT | Performed by: PATHOLOGY

## 2021-02-11 PROCEDURE — 23071 EXC SHOULDER LES SC 3 CM/>: CPT | Performed by: SURGERY

## 2021-02-11 RX ORDER — OXYCODONE HYDROCHLORIDE AND ACETAMINOPHEN 5; 325 MG/1; MG/1
1 TABLET ORAL EVERY 8 HOURS PRN
Qty: 10 TABLET | Refills: 0 | Status: SHIPPED | OUTPATIENT
Start: 2021-02-11 | End: 2021-02-21

## 2021-02-11 RX ORDER — KETAMINE HCL IN NACL, ISO-OSM 100MG/10ML
SYRINGE (ML) INJECTION AS NEEDED
Status: DISCONTINUED | OUTPATIENT
Start: 2021-02-11 | End: 2021-02-11

## 2021-02-11 RX ORDER — LIDOCAINE HYDROCHLORIDE 10 MG/ML
0.5 INJECTION, SOLUTION EPIDURAL; INFILTRATION; INTRACAUDAL; PERINEURAL ONCE AS NEEDED
Status: DISCONTINUED | OUTPATIENT
Start: 2021-02-11 | End: 2021-02-11 | Stop reason: HOSPADM

## 2021-02-11 RX ORDER — PROPOFOL 10 MG/ML
INJECTION, EMULSION INTRAVENOUS CONTINUOUS PRN
Status: DISCONTINUED | OUTPATIENT
Start: 2021-02-11 | End: 2021-02-11

## 2021-02-11 RX ORDER — LIDOCAINE HYDROCHLORIDE 10 MG/ML
INJECTION, SOLUTION EPIDURAL; INFILTRATION; INTRACAUDAL; PERINEURAL AS NEEDED
Status: DISCONTINUED | OUTPATIENT
Start: 2021-02-11 | End: 2021-02-11

## 2021-02-11 RX ORDER — SODIUM CHLORIDE, SODIUM LACTATE, POTASSIUM CHLORIDE, CALCIUM CHLORIDE 600; 310; 30; 20 MG/100ML; MG/100ML; MG/100ML; MG/100ML
125 INJECTION, SOLUTION INTRAVENOUS CONTINUOUS
Status: DISCONTINUED | OUTPATIENT
Start: 2021-02-11 | End: 2021-02-11 | Stop reason: HOSPADM

## 2021-02-11 RX ORDER — ONDANSETRON 2 MG/ML
4 INJECTION INTRAMUSCULAR; INTRAVENOUS ONCE AS NEEDED
Status: DISCONTINUED | OUTPATIENT
Start: 2021-02-11 | End: 2021-02-11 | Stop reason: HOSPADM

## 2021-02-11 RX ORDER — PROPOFOL 10 MG/ML
INJECTION, EMULSION INTRAVENOUS AS NEEDED
Status: DISCONTINUED | OUTPATIENT
Start: 2021-02-11 | End: 2021-02-11

## 2021-02-11 RX ORDER — FENTANYL CITRATE/PF 50 MCG/ML
25 SYRINGE (ML) INJECTION
Status: DISCONTINUED | OUTPATIENT
Start: 2021-02-11 | End: 2021-02-11 | Stop reason: HOSPADM

## 2021-02-11 RX ORDER — SODIUM CHLORIDE, SODIUM LACTATE, POTASSIUM CHLORIDE, CALCIUM CHLORIDE 600; 310; 30; 20 MG/100ML; MG/100ML; MG/100ML; MG/100ML
INJECTION, SOLUTION INTRAVENOUS CONTINUOUS PRN
Status: DISCONTINUED | OUTPATIENT
Start: 2021-02-11 | End: 2021-02-11

## 2021-02-11 RX ADMIN — SODIUM CHLORIDE, SODIUM LACTATE, POTASSIUM CHLORIDE, AND CALCIUM CHLORIDE: .6; .31; .03; .02 INJECTION, SOLUTION INTRAVENOUS at 08:39

## 2021-02-11 RX ADMIN — PROPOFOL 30 MG: 10 INJECTION, EMULSION INTRAVENOUS at 08:45

## 2021-02-11 RX ADMIN — LIDOCAINE HYDROCHLORIDE 50 MG: 10 INJECTION, SOLUTION EPIDURAL; INFILTRATION; INTRACAUDAL; PERINEURAL at 08:45

## 2021-02-11 RX ADMIN — PROPOFOL 100 MCG/KG/MIN: 10 INJECTION, EMULSION INTRAVENOUS at 08:45

## 2021-02-11 RX ADMIN — PHENYLEPHRINE HYDROCHLORIDE 200 MCG: 10 INJECTION INTRAVENOUS at 09:09

## 2021-02-11 RX ADMIN — Medication 20 MG: at 08:45

## 2021-02-11 NOTE — DISCHARGE INSTRUCTIONS
Post-Operative Care Instructions      1  General: You may feel pulling sensations around the wound or funny aches and pains around the incisions  This is normal  Even minor surgery is a change in your body and this is your body's reaction to it  If you have had abdominal surgery, it may help to support the incision with a small pillow or blanket for comfort when moving or coughing  2  Wound care: The glue over the incisions will fall off over the next week or two  If you have staples or stitches, they will be removed by the physician at your follow up appointment  3  Showering: You may shower in 48 hours  Do not soak wound in a bath, hot tub, pool, lake, etc  Do not scrub or use exfoliants on the surgical wounds  4  Activity: You may go up and down stairs, walk as much as you are comfortable, but walk at least 3 times each day  If you have had abdominal surgery, do not perform any strenuous exercise or lift anything heavier than 10-15 pounds for at least 3 weeks, unless cleared by your physician  5  Diet: You may resume your regular diet  6  Medications: Resume all of your previous medications, unless told otherwise by the doctor  A good option for pain control is to start with acetaminophen(Tylenol) 650mg and ibuprofen(Advil) 400mg and alternate taking them every 2 hours  If this is not sufficient then you make take the narcotic pain medicine as prescribed  Insure that you do not take more than 4000 mg of Tylenol per day  You do not need to take the narcotic pain medication unless you are having significant pain and discomfort  7  Driving: You will need someone to drive you home on the day of surgery  Do not drive or make any important decisions while on narcotic pain medication or for up to 24 hours after anesthesia for surgery  Generally, you may drive when you're off all narcotic pain medications  8  Upset Stomach: You may take Maalox, Tums, or similar items for an upset stomach   If your narcotic pain medication causes an upset stomach, do not take it on an empty stomach  Try taking it with at least some crackers or toast      9  Constipation: Patients often experienced constipation after surgery  You may take over-the-counter medication for this, such as Metamucil, Senokot, Colace, milk of magnesia, etc  If you experience significant nausea or vomiting after abdominal surgery, call the office before trying any of these medications  10  Call the office: If you are experiencing any of the following: fevers above 101 5°, significant nausea or vomiting, if the wound develops drainage and/or excessive redness around the wound, or if you have significant diarrhea or other worsening symptoms  11  Pain: You may be given a prescription for pain medication   This should be given to you upon discharge from the hospital

## 2021-02-11 NOTE — INTERVAL H&P NOTE
H&P reviewed  After examining the patient I find no changes in the patients condition since the H&P had been written      Vitals:    02/11/21 0741   BP: 141/77   Pulse: 87   Resp: 16   Temp: (!) 96 5 °F (35 8 °C)   SpO2: 99%

## 2021-02-11 NOTE — ANESTHESIA POSTPROCEDURE EVALUATION
Post-Op Assessment Note    CV Status:  Stable  Pain Score: 0    Pain management: adequate     Mental Status:  Awake and sleepy   Hydration Status:  Euvolemic   PONV Controlled:  Controlled   Airway Patency:  Patent      Post Op Vitals Reviewed: Yes      Staff: CRNA         No complications documented      BP (P) 125/58 (02/11/21 0925)    Temp (!) (P) 96 1 °F (35 6 °C) (02/11/21 0925)    Pulse (P) 83 (02/11/21 0925)   Resp (P) 16 (02/11/21 0925)    SpO2 (P) 100 % (02/11/21 0925)

## 2021-02-11 NOTE — OP NOTE
OPERATIVE REPORT  PATIENT NAME: Mary Duke    :  1950  MRN: [id number]  Pt Location: [facility] OR ROOM 01    SURGERY DATE: 2021    Surgeon(s) and Role: Haydee Garcia MD - Primary     * Miranda Call PA-C - Assisting    Preop Diagnosis:  Lipoma of right upper extremity [D17 21]    Post-Op Diagnosis Codes:     * Lipoma of right upper extremity [D17 21]    Procedure:    Excision of soft tissue tumor subcutaneous of right shoulder 6 cm x 5 cm  CPT code 70449    Specimen(s):  ID Type Source Tests Collected by Time Destination   1 : Lipoma Right Shoulder Tissue Soft Tissue, Lipoma TISSUE EXAM Joao Bro MD 2021 [id number]        Estimated Blood Loss:   Minimal    Drains:  * No LDAs found *    Anesthesia Type:   IV Sedation with Anesthesia    Operative Indications:  Lipoma of right upper extremity [D17 21]      Operative Findings:  6 cm x 5 cm subcutaneous soft tissue tumor of the right shoulder  Complications:   None    Procedure and Technique:  The patient was brought to the operating room and was identified correctly by myself and the operating room staff  Anesthesia placed a magnet on her pacemaker  IV sedation was given by anesthesia provider  Parts were prepped and draped in the standard fashion  A time-out was then performed  Infiltration with 1% lidocaine with epinephrine was done around the tumor site as well as over the incision site  An incision was made about 4 cm over the tumor  It was deepened through the subcutaneous tissue  The tumor was then dissected away from the surrounding tissue  The deep portion of the tumor was attached to the shoulder muscle  It was dissected with the help of electrocautery  Once the tumor was removed it was sent for pathology  We irrigated the cavity  Hemostasis was attained using electrocautery  The incision was then closed in 2 layers  The 1st layer was with 3-0 Vicryl subcutaneous in an interrupted fashion    The skin was then closed with 4-0 Monocryl in a running fashion  Exofin was applied  Patient was reversed from anesthesia and was taken to the recovery under stable condition     I was present for the entire procedure, A qualified resident physician was not available and A physician assistant was required during the procedure for retraction tissue handling,dissection and suturing    Patient Disposition:  PACU     SIGNATURE: Delores Camara MD  DATE: February 11, 2021  TIME: 9:17 AM

## 2021-02-26 ENCOUNTER — OFFICE VISIT (OUTPATIENT)
Dept: SURGERY | Facility: CLINIC | Age: 71
End: 2021-02-26

## 2021-02-26 VITALS
TEMPERATURE: 97 F | WEIGHT: 170 LBS | SYSTOLIC BLOOD PRESSURE: 110 MMHG | RESPIRATION RATE: 18 BRPM | HEART RATE: 66 BPM | HEIGHT: 60 IN | BODY MASS INDEX: 33.38 KG/M2 | DIASTOLIC BLOOD PRESSURE: 68 MMHG

## 2021-02-26 DIAGNOSIS — D17.21 LIPOMA OF RIGHT UPPER EXTREMITY: Primary | ICD-10-CM

## 2021-02-26 PROCEDURE — 3008F BODY MASS INDEX DOCD: CPT | Performed by: SURGERY

## 2021-02-26 PROCEDURE — 99024 POSTOP FOLLOW-UP VISIT: CPT | Performed by: SURGERY

## 2021-02-26 RX ORDER — ESZOPICLONE 2 MG/1
2 TABLET, FILM COATED ORAL DAILY PRN
COMMUNITY
End: 2021-06-16 | Stop reason: ALTCHOICE

## 2021-02-26 NOTE — PROGRESS NOTES
Assessment/Plan:  She is doing well  Follow-up p r n  No problem-specific Assessment & Plan notes found for this encounter  Diagnoses and all orders for this visit:    Lipoma of right upper extremity    Other orders  -     eszopiclone (LUNESTA) 2 mg tablet; Take 2 mg by mouth daily as needed          Subjective:      Patient ID: Raleigh Drew is a 72 y o  female  59-year-old female patient who is 2 weeks status post excision of subcutaneous mass right shoulder  Doing well  The following portions of the patient's history were reviewed and updated as appropriate: allergies, current medications, past medical history, past social history, past surgical history and problem list     Review of Systems   All other systems reviewed and are negative          Objective:      /68 (BP Location: Right arm, Patient Position: Sitting, Cuff Size: Adult)   Pulse 66   Temp (!) 97 °F (36 1 °C)   Resp 18   Ht 5' (1 524 m)   Wt 77 1 kg (170 lb)   BMI 33 20 kg/m²          Physical Exam  Skin:     Comments: Her skin incision is completely healed

## 2021-03-18 ENCOUNTER — RA CDI HCC (OUTPATIENT)
Dept: OTHER | Facility: HOSPITAL | Age: 71
End: 2021-03-18

## 2021-03-23 RX ORDER — SACUBITRIL AND VALSARTAN 49; 51 MG/1; MG/1
1 TABLET, FILM COATED ORAL 2 TIMES DAILY
COMMUNITY
Start: 2021-03-22

## 2021-03-26 ENCOUNTER — TELEPHONE (OUTPATIENT)
Dept: FAMILY MEDICINE CLINIC | Facility: CLINIC | Age: 71
End: 2021-03-26

## 2021-03-26 NOTE — TELEPHONE ENCOUNTER
Attempt number 1 to outreach patient for potential plasma donation for COVID-19 patients  VM left with call back number   [phone]

## 2021-04-10 DIAGNOSIS — E78.2 MIXED HYPERLIPIDEMIA: ICD-10-CM

## 2021-04-12 RX ORDER — ATORVASTATIN CALCIUM 20 MG/1
TABLET, FILM COATED ORAL
Qty: 90 TABLET | Refills: 1 | Status: SHIPPED | OUTPATIENT
Start: 2021-04-12 | End: 2021-10-06

## 2021-04-21 ENCOUNTER — VBI (OUTPATIENT)
Dept: ADMINISTRATIVE | Facility: OTHER | Age: 71
End: 2021-04-21

## 2021-05-21 ENCOUNTER — VBI (OUTPATIENT)
Dept: ADMINISTRATIVE | Facility: OTHER | Age: 71
End: 2021-05-21

## 2021-06-01 DIAGNOSIS — E03.8 OTHER SPECIFIED HYPOTHYROIDISM: ICD-10-CM

## 2021-06-01 RX ORDER — LEVOTHYROXINE SODIUM 0.07 MG/1
TABLET ORAL
Qty: 90 TABLET | Refills: 1 | Status: SHIPPED | OUTPATIENT
Start: 2021-06-01 | End: 2021-10-04 | Stop reason: DRUGHIGH

## 2021-06-15 RX ORDER — TRAZODONE HYDROCHLORIDE 150 MG/1
75 TABLET ORAL
COMMUNITY
Start: 2021-05-24 | End: 2021-07-20 | Stop reason: SDUPTHER

## 2021-06-15 RX ORDER — OMEGA-3-ACID ETHYL ESTERS 1 G/1
1 CAPSULE, LIQUID FILLED ORAL 2 TIMES DAILY
COMMUNITY
Start: 2021-04-23

## 2021-06-15 RX ORDER — CEPHALEXIN 250 MG/1
CAPSULE ORAL
COMMUNITY
Start: 2021-05-24 | End: 2021-10-04 | Stop reason: ALTCHOICE

## 2021-06-15 RX ORDER — ESZOPICLONE 3 MG/1
3 TABLET, FILM COATED ORAL
COMMUNITY
Start: 2021-04-19 | End: 2021-06-16 | Stop reason: ALTCHOICE

## 2021-06-16 ENCOUNTER — OFFICE VISIT (OUTPATIENT)
Dept: FAMILY MEDICINE CLINIC | Facility: CLINIC | Age: 71
End: 2021-06-16
Payer: COMMERCIAL

## 2021-06-16 VITALS
HEIGHT: 60 IN | BODY MASS INDEX: 34.16 KG/M2 | SYSTOLIC BLOOD PRESSURE: 120 MMHG | DIASTOLIC BLOOD PRESSURE: 72 MMHG | HEART RATE: 74 BPM | OXYGEN SATURATION: 96 % | RESPIRATION RATE: 16 BRPM | WEIGHT: 174 LBS

## 2021-06-16 DIAGNOSIS — L50.9 HIVES: Primary | ICD-10-CM

## 2021-06-16 DIAGNOSIS — Z12.12 SCREENING FOR COLORECTAL CANCER: ICD-10-CM

## 2021-06-16 DIAGNOSIS — I42.9 CARDIOMYOPATHY, UNSPECIFIED TYPE (HCC): ICD-10-CM

## 2021-06-16 DIAGNOSIS — E83.51 HYPOCALCEMIA: ICD-10-CM

## 2021-06-16 DIAGNOSIS — Z12.11 SCREENING FOR COLORECTAL CANCER: ICD-10-CM

## 2021-06-16 DIAGNOSIS — I10 BENIGN ESSENTIAL HYPERTENSION: ICD-10-CM

## 2021-06-16 DIAGNOSIS — F41.9 ANXIETY: ICD-10-CM

## 2021-06-16 DIAGNOSIS — E03.8 OTHER SPECIFIED HYPOTHYROIDISM: ICD-10-CM

## 2021-06-16 PROBLEM — J01.00 SUBACUTE MAXILLARY SINUSITIS: Status: RESOLVED | Noted: 2020-11-09 | Resolved: 2021-06-16

## 2021-06-16 PROBLEM — M25.512 ACUTE PAIN OF LEFT SHOULDER: Status: RESOLVED | Noted: 2020-11-09 | Resolved: 2021-06-16

## 2021-06-16 PROCEDURE — 1160F RVW MEDS BY RX/DR IN RCRD: CPT | Performed by: FAMILY MEDICINE

## 2021-06-16 PROCEDURE — 3078F DIAST BP <80 MM HG: CPT | Performed by: FAMILY MEDICINE

## 2021-06-16 PROCEDURE — 3074F SYST BP LT 130 MM HG: CPT | Performed by: FAMILY MEDICINE

## 2021-06-16 PROCEDURE — 3008F BODY MASS INDEX DOCD: CPT | Performed by: FAMILY MEDICINE

## 2021-06-16 PROCEDURE — 1036F TOBACCO NON-USER: CPT | Performed by: FAMILY MEDICINE

## 2021-06-16 PROCEDURE — 3288F FALL RISK ASSESSMENT DOCD: CPT | Performed by: FAMILY MEDICINE

## 2021-06-16 PROCEDURE — 1101F PT FALLS ASSESS-DOCD LE1/YR: CPT | Performed by: FAMILY MEDICINE

## 2021-06-16 PROCEDURE — 3725F SCREEN DEPRESSION PERFORMED: CPT | Performed by: FAMILY MEDICINE

## 2021-06-16 PROCEDURE — 99214 OFFICE O/P EST MOD 30 MIN: CPT | Performed by: FAMILY MEDICINE

## 2021-06-16 RX ORDER — OMEGA-3S/DHA/EPA/FISH OIL/D3 300MG-1000
400 CAPSULE ORAL DAILY
COMMUNITY
End: 2021-06-16 | Stop reason: ALTCHOICE

## 2021-06-16 NOTE — PROGRESS NOTES
Assessment/Plan:    Problem List Items Addressed This Visit        Endocrine    Other specified hypothyroidism    Relevant Orders    TSH, 3rd generation   TSH is normal she will continue same dose of levothyroxine       Cardiovascular and Mediastinum    Benign essential hypertension    Relevant Orders    Comprehensive metabolic panel    Lipid panel    Cardiomyopathy ([address] )    Relevant Orders    Lipid panel   follows cardiologist, and has revision of ICD and she is stable       Musculoskeletal and Integument    Hives - Primary   she is getting hives which are pruritic and lip swelling intermittently for last 1 month and she takes Benadryl and hives resolve and the come back again  The only new medication is Entresto in last few months, I discussed with her that she should talk to the cardiologist, could be the cause of her hives is this new medication, in the meantime advised to take loratadine 10 mg daily to suppress any new hives and Benadryl if hives come       Other    Anxiety    Screening for colorectal cancer    Relevant Orders    Cologuard    Hypocalcemia   her last labs shows low calcium, advised to start Caltrate which has calcium and vitamin-D twice a day          Chief Complaint   Patient presents with    Follow-up     BMI Counseling: Body mass index is 33 98 kg/m²  The BMI is above normal  Nutrition recommendations include decreasing portion sizes, encouraging healthy choices of fruits and vegetables, consuming healthier snacks, limiting drinks that contain sugar, moderation in carbohydrate intake and reducing intake of cholesterol  Exercise recommendations include exercising 3-5 times per week  Subjective:   Patient ID: Zuleyka Dasilva is a 72 y o  female  She is here for follow-up, she has revision of her ICD implant by her cardiologist, which is healing well and she is doing fine, she says Donaldelle Stair was started few months ago that is a new medication     she has been noticing hives intermittently on her body arms and face and lip swelling and then she takes Benadryl and that helps to relieve the hives and then intermittently keeps coming, she has no new food no new environment  she has hypothyroid and she is on levothyroxine   she was psychiatrist and she takes Lexapro for anxiety which is under control she says she stop Lunesta as it was not working for her sleep she is also on trazodone    Review of Systems   Constitutional: Negative for activity change, appetite change, chills, fatigue, fever and unexpected weight change  HENT: Negative for congestion, ear discharge, ear pain, nosebleeds, postnasal drip, rhinorrhea, sinus pressure, sneezing, sore throat, trouble swallowing and voice change  Eyes: Negative for photophobia, pain, discharge, redness and itching  Respiratory: Negative for cough, chest tightness, shortness of breath and wheezing  Cardiovascular: Negative for chest pain, palpitations and leg swelling  Gastrointestinal: Negative for abdominal pain, constipation, diarrhea, nausea and vomiting  Endocrine: Negative for polyuria  Genitourinary: Negative for dysuria, frequency and urgency  Musculoskeletal: Negative for arthralgias, back pain, myalgias and neck pain  Skin: Negative for color change, pallor and rash  Allergic/Immunologic: Negative for environmental allergies and food allergies  Neurological: Negative for dizziness, weakness, light-headedness and headaches  Hematological: Negative for adenopathy  Does not bruise/bleed easily  Psychiatric/Behavioral: Negative for behavioral problems  The patient is not nervous/anxious  Objective:  Physical Exam  Vitals and nursing note reviewed  Constitutional:       Appearance: She is well-developed  HENT:      Head: Normocephalic and atraumatic        Right Ear: External ear normal       Left Ear: External ear normal       Nose: Nose normal       Mouth/Throat:      Pharynx: No oropharyngeal exudate  Eyes:      General: No scleral icterus  Right eye: No discharge  Left eye: No discharge  Conjunctiva/sclera: Conjunctivae normal       Pupils: Pupils are equal, round, and reactive to light  Neck:      Thyroid: No thyromegaly  Trachea: No tracheal deviation  Cardiovascular:      Rate and Rhythm: Normal rate and regular rhythm  Heart sounds: Normal heart sounds  No murmur heard  Pulmonary:      Effort: Pulmonary effort is normal  No respiratory distress  Breath sounds: Normal breath sounds  No wheezing or rales  Abdominal:      General: Bowel sounds are normal  There is no distension  Palpations: Abdomen is soft  There is no mass  Tenderness: There is no abdominal tenderness  There is no rebound  Musculoskeletal:         General: Normal range of motion  Cervical back: Normal range of motion and neck supple  Lymphadenopathy:      Cervical: No cervical adenopathy  Skin:     General: Skin is warm  Coloration: Skin is not pale  Findings: No erythema or rash  Comments:  No hives today   Neurological:      Mental Status: She is alert and oriented to person, place, and time  Cranial Nerves: No cranial nerve deficit  Deep Tendon Reflexes: Reflexes are normal and symmetric  Psychiatric:         Behavior: Behavior normal          Thought Content:  Thought content normal          Judgment: Judgment normal             Past Surgical History:   Procedure Laterality Date    CARDIAC PACEMAKER PLACEMENT  08/22/2012    ICD-Biotronik-Lumax    CT EXCISION TUMOR SOFT TISSUE SHOULDER SUBQ 3+CM Right 2/11/2021    Procedure: EXCISION BIOPSY TISSUE LESION/MASS UPPER EXTREMITY;  Surgeon: Joaquin Dudley MD;  Location:  MAIN OR;  Service: General       Family History   Adopted: Yes   Problem Relation Age of Onset    Cancer Mother     Thyroid cancer Sister     Cancer Sister     Cancer Brother          Current Outpatient Medications:    ALPRAZolam (XANAX) 0 25 mg tablet, Take 0 25 mg by mouth as needed Pt states takes this daily, as needed  , Disp: , Rfl:     atorvastatin (LIPITOR) 20 mg tablet, take 1 tablet by mouth once daily, Disp: 90 tablet, Rfl: 1    carvedilol (COREG) 12 5 mg tablet, Take 12 5 mg by mouth 2 (two) times a day , Disp: , Rfl:     escitalopram (LEXAPRO) 20 mg tablet, Take 1 tablet by mouth daily, Disp: , Rfl: 0    fluticasone (FLONASE) 50 mcg/act nasal spray, 2 sprays into each nostril daily (Patient taking differently: 2 sprays into each nostril as needed ), Disp: 16 g, Rfl: 1    levothyroxine 75 mcg tablet, take 1 tablet by mouth every morning, Disp: 90 tablet, Rfl: 1    sacubitril-valsartan (Entresto) 49-51 MG TABS, Take 1 tablet by mouth 2 (two) times a day , Disp: , Rfl:     Calcium Carbonate (Caltrate 600) 1500 (600 Ca) MG TABS, Take 1 tablet by mouth 2 (two) times a day, Disp: 180 tablet, Rfl: 0    cephalexin (KEFLEX) 250 mg capsule, take 2 capsules by mouth twice a day for 4 days, Disp: , Rfl:     omega-3-acid ethyl esters (LOVAZA) 1 g capsule, Take 1 g by mouth 2 (two) times a day, Disp: , Rfl:     traZODone (DESYREL) 150 mg tablet, Take 75 mg by mouth daily at bedtime, Disp: , Rfl:     Allergies   Allergen Reactions    Doxycycline GI Intolerance     Pt states with upset stomach    Amoxicillin GI Intolerance     Upset stomach       Vitals:    06/16/21 0806   BP: 120/72   Pulse: 74   Resp: 16   SpO2: 96%   Weight: 78 9 kg (174 lb)   Height: 5' (1 524 m)

## 2021-06-29 ENCOUNTER — TELEPHONE (OUTPATIENT)
Dept: FAMILY MEDICINE CLINIC | Facility: CLINIC | Age: 71
End: 2021-06-29

## 2021-06-29 NOTE — TELEPHONE ENCOUNTER
Patient came to the window w/ updated ins info    she now has [employer] as of 7/1/21- her psychiatrist is not par w/ that insurance    patient is wondering if Dr Orellana Offer would take over prescribing her trazodone and xanax? Please call her back

## 2021-06-30 ENCOUNTER — TELEPHONE (OUTPATIENT)
Dept: PSYCHIATRY | Facility: CLINIC | Age: 71
End: 2021-06-30

## 2021-06-30 NOTE — TELEPHONE ENCOUNTER
Pt called looking for   She said her insurnce as of 7/1 will be [facility]   I told her that is not in network

## 2021-07-20 DIAGNOSIS — F41.9 ANXIETY: Primary | ICD-10-CM

## 2021-07-20 DIAGNOSIS — G47.00 INSOMNIA, UNSPECIFIED TYPE: ICD-10-CM

## 2021-07-20 NOTE — TELEPHONE ENCOUNTER
Patient stopped by the office requesting refill on medications there were originally prescribed by her psychiatrist   Patient had to change insurances due to provider coverage and now needs to find a new psychiatrist   She stated that she discussed this with you last month and you told her you would refill them in the interim while she is trying to get in with specialist     PDMP:

## 2021-07-21 RX ORDER — TRAZODONE HYDROCHLORIDE 150 MG/1
TABLET ORAL
Qty: 15 TABLET | Refills: 0 | Status: SHIPPED | OUTPATIENT
Start: 2021-07-21 | End: 2022-04-12 | Stop reason: DRUGHIGH

## 2021-07-21 RX ORDER — ALPRAZOLAM 0.25 MG/1
0.25 TABLET ORAL DAILY
Qty: 30 TABLET | Refills: 0 | Status: SHIPPED | OUTPATIENT
Start: 2021-07-21

## 2021-10-04 ENCOUNTER — OFFICE VISIT (OUTPATIENT)
Dept: FAMILY MEDICINE CLINIC | Facility: CLINIC | Age: 71
End: 2021-10-04
Payer: COMMERCIAL

## 2021-10-04 ENCOUNTER — TELEPHONE (OUTPATIENT)
Dept: FAMILY MEDICINE CLINIC | Facility: CLINIC | Age: 71
End: 2021-10-04

## 2021-10-04 VITALS
OXYGEN SATURATION: 98 % | WEIGHT: 171.4 LBS | SYSTOLIC BLOOD PRESSURE: 122 MMHG | HEIGHT: 60 IN | RESPIRATION RATE: 18 BRPM | BODY MASS INDEX: 33.65 KG/M2 | TEMPERATURE: 98.6 F | DIASTOLIC BLOOD PRESSURE: 75 MMHG | HEART RATE: 72 BPM

## 2021-10-04 DIAGNOSIS — J01.00 SUBACUTE MAXILLARY SINUSITIS: ICD-10-CM

## 2021-10-04 DIAGNOSIS — I42.9 CARDIOMYOPATHY, UNSPECIFIED TYPE (HCC): ICD-10-CM

## 2021-10-04 DIAGNOSIS — I10 BENIGN ESSENTIAL HYPERTENSION: ICD-10-CM

## 2021-10-04 DIAGNOSIS — B34.9 VIRAL INFECTION, UNSPECIFIED: Primary | ICD-10-CM

## 2021-10-04 DIAGNOSIS — E03.9 HYPOTHYROIDISM, UNSPECIFIED TYPE: ICD-10-CM

## 2021-10-04 PROCEDURE — 3074F SYST BP LT 130 MM HG: CPT | Performed by: FAMILY MEDICINE

## 2021-10-04 PROCEDURE — 1160F RVW MEDS BY RX/DR IN RCRD: CPT | Performed by: FAMILY MEDICINE

## 2021-10-04 PROCEDURE — 3008F BODY MASS INDEX DOCD: CPT | Performed by: FAMILY MEDICINE

## 2021-10-04 PROCEDURE — 3725F SCREEN DEPRESSION PERFORMED: CPT | Performed by: FAMILY MEDICINE

## 2021-10-04 PROCEDURE — 1036F TOBACCO NON-USER: CPT | Performed by: FAMILY MEDICINE

## 2021-10-04 PROCEDURE — 3078F DIAST BP <80 MM HG: CPT | Performed by: FAMILY MEDICINE

## 2021-10-04 PROCEDURE — 99214 OFFICE O/P EST MOD 30 MIN: CPT | Performed by: FAMILY MEDICINE

## 2021-10-04 RX ORDER — AZITHROMYCIN 250 MG/1
TABLET, FILM COATED ORAL
Qty: 6 TABLET | Refills: 0 | Status: SHIPPED | OUTPATIENT
Start: 2021-10-04 | End: 2021-10-09

## 2021-10-04 RX ORDER — FLUTICASONE PROPIONATE 50 MCG
2 SPRAY, SUSPENSION (ML) NASAL DAILY
Qty: 16 G | Refills: 1 | Status: SHIPPED | OUTPATIENT
Start: 2021-10-04 | End: 2022-04-12 | Stop reason: SDUPTHER

## 2021-10-04 RX ORDER — LEVOTHYROXINE SODIUM 88 UG/1
88 TABLET ORAL DAILY
Qty: 90 TABLET | Refills: 1 | Status: SHIPPED | OUTPATIENT
Start: 2021-10-04 | End: 2021-11-11 | Stop reason: DRUGHIGH

## 2021-10-06 DIAGNOSIS — E78.2 MIXED HYPERLIPIDEMIA: ICD-10-CM

## 2021-10-06 RX ORDER — ATORVASTATIN CALCIUM 20 MG/1
TABLET, FILM COATED ORAL
Qty: 90 TABLET | Refills: 1 | Status: SHIPPED | OUTPATIENT
Start: 2021-10-06 | End: 2022-04-04

## 2021-11-02 ENCOUNTER — TELEPHONE (OUTPATIENT)
Dept: FAMILY MEDICINE CLINIC | Facility: CLINIC | Age: 71
End: 2021-11-02

## 2021-11-10 ENCOUNTER — RA CDI HCC (OUTPATIENT)
Dept: OTHER | Facility: HOSPITAL | Age: 71
End: 2021-11-10

## 2021-11-10 RX ORDER — SACUBITRIL AND VALSARTAN 97; 103 MG/1; MG/1
1 TABLET, FILM COATED ORAL 2 TIMES DAILY
COMMUNITY
Start: 2021-10-06

## 2021-11-11 ENCOUNTER — OFFICE VISIT (OUTPATIENT)
Dept: FAMILY MEDICINE CLINIC | Facility: CLINIC | Age: 71
End: 2021-11-11
Payer: COMMERCIAL

## 2021-11-11 VITALS
DIASTOLIC BLOOD PRESSURE: 68 MMHG | SYSTOLIC BLOOD PRESSURE: 100 MMHG | HEART RATE: 68 BPM | BODY MASS INDEX: 33.77 KG/M2 | HEIGHT: 60 IN | WEIGHT: 172 LBS | RESPIRATION RATE: 16 BRPM

## 2021-11-11 DIAGNOSIS — F41.9 ANXIETY: ICD-10-CM

## 2021-11-11 DIAGNOSIS — I10 BENIGN ESSENTIAL HYPERTENSION: ICD-10-CM

## 2021-11-11 DIAGNOSIS — E03.9 HYPOTHYROIDISM, UNSPECIFIED TYPE: ICD-10-CM

## 2021-11-11 DIAGNOSIS — E78.00 HYPERCHOLESTEROLEMIA: ICD-10-CM

## 2021-11-11 DIAGNOSIS — Z00.00 MEDICARE ANNUAL WELLNESS VISIT, SUBSEQUENT: Primary | ICD-10-CM

## 2021-11-11 DIAGNOSIS — I42.9 CARDIOMYOPATHY, UNSPECIFIED TYPE (HCC): ICD-10-CM

## 2021-11-11 PROBLEM — B34.9 VIRAL INFECTION, UNSPECIFIED: Status: RESOLVED | Noted: 2021-10-04 | Resolved: 2021-11-11

## 2021-11-11 PROBLEM — L50.9 HIVES: Status: RESOLVED | Noted: 2021-06-16 | Resolved: 2021-11-11

## 2021-11-11 PROBLEM — E83.51 HYPOCALCEMIA: Status: RESOLVED | Noted: 2021-06-16 | Resolved: 2021-11-11

## 2021-11-11 PROBLEM — J01.00 SUBACUTE MAXILLARY SINUSITIS: Status: RESOLVED | Noted: 2020-11-09 | Resolved: 2021-11-11

## 2021-11-11 PROCEDURE — 1125F AMNT PAIN NOTED PAIN PRSNT: CPT | Performed by: FAMILY MEDICINE

## 2021-11-11 PROCEDURE — G0439 PPPS, SUBSEQ VISIT: HCPCS | Performed by: FAMILY MEDICINE

## 2021-11-11 PROCEDURE — 3725F SCREEN DEPRESSION PERFORMED: CPT | Performed by: FAMILY MEDICINE

## 2021-11-11 PROCEDURE — 3008F BODY MASS INDEX DOCD: CPT | Performed by: FAMILY MEDICINE

## 2021-11-11 PROCEDURE — 3074F SYST BP LT 130 MM HG: CPT | Performed by: FAMILY MEDICINE

## 2021-11-11 PROCEDURE — 3078F DIAST BP <80 MM HG: CPT | Performed by: FAMILY MEDICINE

## 2021-11-11 PROCEDURE — 1170F FXNL STATUS ASSESSED: CPT | Performed by: FAMILY MEDICINE

## 2021-11-11 PROCEDURE — 1036F TOBACCO NON-USER: CPT | Performed by: FAMILY MEDICINE

## 2021-11-11 PROCEDURE — 1101F PT FALLS ASSESS-DOCD LE1/YR: CPT | Performed by: FAMILY MEDICINE

## 2021-11-11 PROCEDURE — 1160F RVW MEDS BY RX/DR IN RCRD: CPT | Performed by: FAMILY MEDICINE

## 2021-11-11 PROCEDURE — 99214 OFFICE O/P EST MOD 30 MIN: CPT | Performed by: FAMILY MEDICINE

## 2021-11-11 PROCEDURE — 3288F FALL RISK ASSESSMENT DOCD: CPT | Performed by: FAMILY MEDICINE

## 2021-11-11 RX ORDER — LEVOTHYROXINE SODIUM 0.07 MG/1
75 TABLET ORAL
Qty: 90 TABLET | Refills: 3 | Status: SHIPPED | OUTPATIENT
Start: 2021-11-11

## 2022-03-31 DIAGNOSIS — E78.2 MIXED HYPERLIPIDEMIA: ICD-10-CM

## 2022-03-31 NOTE — TELEPHONE ENCOUNTER
[facility] patient is due for a follow up visit and also was to have a TSH done in Dec (order in chart)

## 2022-04-04 RX ORDER — ATORVASTATIN CALCIUM 20 MG/1
TABLET, FILM COATED ORAL
Qty: 90 TABLET | Refills: 1 | Status: SHIPPED | OUTPATIENT
Start: 2022-04-04

## 2022-04-06 ENCOUNTER — RA CDI HCC (OUTPATIENT)
Dept: OTHER | Facility: HOSPITAL | Age: 72
End: 2022-04-06

## 2022-04-06 NOTE — PROGRESS NOTES
[facility]  coding opportunities    [id number] and I11 [id number]  Chart Reviewed number of suggestions sent to Provider: 2     Patients Insurance     Medicare Insurance: [address] [place] [address]

## 2022-04-12 ENCOUNTER — OFFICE VISIT (OUTPATIENT)
Dept: FAMILY MEDICINE CLINIC | Facility: CLINIC | Age: 72
End: 2022-04-12
Payer: COMMERCIAL

## 2022-04-12 VITALS
DIASTOLIC BLOOD PRESSURE: 70 MMHG | HEART RATE: 72 BPM | WEIGHT: 169 LBS | OXYGEN SATURATION: 98 % | BODY MASS INDEX: 33.18 KG/M2 | HEIGHT: 60 IN | RESPIRATION RATE: 16 BRPM | SYSTOLIC BLOOD PRESSURE: 120 MMHG

## 2022-04-12 DIAGNOSIS — Z78.0 MENOPAUSE: ICD-10-CM

## 2022-04-12 DIAGNOSIS — I10 BENIGN ESSENTIAL HYPERTENSION: ICD-10-CM

## 2022-04-12 DIAGNOSIS — I42.9 CARDIOMYOPATHY, UNSPECIFIED TYPE (HCC): Primary | ICD-10-CM

## 2022-04-12 DIAGNOSIS — Z95.0 CARDIAC PACEMAKER: ICD-10-CM

## 2022-04-12 DIAGNOSIS — J30.1 SEASONAL ALLERGIC RHINITIS DUE TO POLLEN: ICD-10-CM

## 2022-04-12 DIAGNOSIS — E03.9 HYPOTHYROIDISM, UNSPECIFIED TYPE: ICD-10-CM

## 2022-04-12 PROCEDURE — 1160F RVW MEDS BY RX/DR IN RCRD: CPT | Performed by: FAMILY MEDICINE

## 2022-04-12 PROCEDURE — 1003F LEVEL OF ACTIVITY ASSESS: CPT | Performed by: FAMILY MEDICINE

## 2022-04-12 PROCEDURE — 1036F TOBACCO NON-USER: CPT | Performed by: FAMILY MEDICINE

## 2022-04-12 PROCEDURE — 99214 OFFICE O/P EST MOD 30 MIN: CPT | Performed by: FAMILY MEDICINE

## 2022-04-12 PROCEDURE — 1101F PT FALLS ASSESS-DOCD LE1/YR: CPT | Performed by: FAMILY MEDICINE

## 2022-04-12 PROCEDURE — 3078F DIAST BP <80 MM HG: CPT | Performed by: FAMILY MEDICINE

## 2022-04-12 PROCEDURE — 3725F SCREEN DEPRESSION PERFORMED: CPT | Performed by: FAMILY MEDICINE

## 2022-04-12 PROCEDURE — 3008F BODY MASS INDEX DOCD: CPT | Performed by: FAMILY MEDICINE

## 2022-04-12 PROCEDURE — 3288F FALL RISK ASSESSMENT DOCD: CPT | Performed by: FAMILY MEDICINE

## 2022-04-12 PROCEDURE — 3074F SYST BP LT 130 MM HG: CPT | Performed by: FAMILY MEDICINE

## 2022-04-12 RX ORDER — TRAZODONE HYDROCHLORIDE 50 MG/1
50 TABLET ORAL
COMMUNITY
Start: 2022-04-11

## 2022-04-12 RX ORDER — FLUTICASONE PROPIONATE 50 MCG
2 SPRAY, SUSPENSION (ML) NASAL DAILY
Qty: 16 G | Refills: 2 | Status: SHIPPED | OUTPATIENT
Start: 2022-04-12

## 2022-04-12 RX ORDER — FLUTICASONE PROPIONATE 50 MCG
2 SPRAY, SUSPENSION (ML) NASAL DAILY
Qty: 16 G | Refills: 1 | Status: SHIPPED | OUTPATIENT
Start: 2022-04-12 | End: 2022-04-12 | Stop reason: DRUGHIGH

## 2022-04-12 RX ORDER — ICOSAPENT ETHYL 1000 MG/1
1 CAPSULE ORAL 2 TIMES DAILY
COMMUNITY
Start: 2022-04-07

## 2022-04-12 NOTE — ASSESSMENT & PLAN NOTE
TSH is normal, labs are reviewed and she will continue levothyroxine 75 mcg and recheck TSH in 6 month

## 2022-04-12 NOTE — PROGRESS NOTES
Assessment/Plan:    Problem List Items Addressed This Visit        Endocrine    Hypothyroidism     TSH is normal, labs are reviewed and she will continue levothyroxine 75 mcg and recheck TSH in 6 month         Relevant Orders    TSH, 3rd generation       Respiratory    Allergic rhinitis     She wants to get to the refill on Flonase as she get seasonal nasal stuffiness         Relevant Medications    fluticasone (FLONASE) 50 mcg/act nasal spray       Cardiovascular and Mediastinum    Benign essential hypertension    Cardiomyopathy ([address] ) - Primary     She has been stable no recent changes she had evaluation by cardiologist 3 days ago            Other    Menopause    Relevant Orders    DXA bone density spine hip and pelvis    Cardiac pacemaker          Return in about 6 months (around 10/12/2022) for Recheck  Chief Complaint   Patient presents with    Follow-up       Subjective:   Patient ID: Elijah Julian is a 80 y o  female  She is here for follow-up, she has a pacemaker, no recent chest pain shortness of breath or leg swelling,   She has hyperlipidemia on Lipitor and she has hypothyroid on levothyroxine 75 mcg  She takes Xanax and Lexapro from the psychiatrist and she is on trazodone 50 mg now the dose was reduced for her psychiatric  HPI    Review of Systems   Constitutional: Negative for activity change, appetite change, chills, fatigue, fever and unexpected weight change  HENT: Negative for congestion, ear discharge, ear pain, nosebleeds, postnasal drip, rhinorrhea, sinus pressure, sneezing, sore throat, trouble swallowing and voice change  Eyes: Negative for photophobia, pain, discharge, redness and itching  Respiratory: Negative for cough, chest tightness, shortness of breath and wheezing  Cardiovascular: Negative for chest pain, palpitations and leg swelling  Gastrointestinal: Negative for abdominal pain, constipation, diarrhea, nausea and vomiting  Endocrine: Negative for polyuria  Genitourinary: Negative for dysuria, frequency and urgency  Musculoskeletal: Negative for arthralgias, back pain, myalgias and neck pain  Skin: Negative for color change, pallor and rash  Allergic/Immunologic: Negative for environmental allergies and food allergies  Neurological: Negative for dizziness, weakness, light-headedness and headaches  Hematological: Negative for adenopathy  Does not bruise/bleed easily  Psychiatric/Behavioral: Negative for behavioral problems  The patient is not nervous/anxious  BMI Counseling: Body mass index is 33 01 kg/m²  The BMI is above normal  Nutrition recommendations include decreasing portion sizes, moderation in carbohydrate intake and reducing intake of cholesterol  Exercise recommendations include exercising 3-5 times per week  Rationale for BMI follow-up plan is due to patient being overweight or obese  Depression Screening and Follow-up Plan: Patient was screened for depression during today's encounter  They screened negative with a PHQ-2 score of 0  Objective:  Physical Exam  Vitals and nursing note reviewed  Constitutional:       Appearance: She is well-developed  She is not ill-appearing  HENT:      Head: Normocephalic and atraumatic  Right Ear: External ear normal       Left Ear: External ear normal       Mouth/Throat:      Mouth: Mucous membranes are moist    Eyes:      General: No scleral icterus  Extraocular Movements: Extraocular movements intact  Conjunctiva/sclera: Conjunctivae normal       Pupils: Pupils are equal, round, and reactive to light  Neck:      Thyroid: No thyromegaly  Cardiovascular:      Rate and Rhythm: Normal rate and regular rhythm  Heart sounds: Normal heart sounds  Pulmonary:      Effort: Pulmonary effort is normal       Breath sounds: Normal breath sounds  No wheezing or rales  Musculoskeletal:      Cervical back: Normal range of motion and neck supple  Right lower leg: No edema  Left lower leg: No edema  Lymphadenopathy:      Cervical: No cervical adenopathy  Skin:     Coloration: Skin is not jaundiced  Findings: No erythema or rash  Neurological:      General: No focal deficit present  Mental Status: She is alert and oriented to person, place, and time  Psychiatric:         Mood and Affect: Mood normal             Past Surgical History:   Procedure Laterality Date    CARDIAC PACEMAKER PLACEMENT  08/22/2012    ICD-Biotronik-Lumax    [place] EXCISION TUMOR SOFT TISSUE SHOULDER SUBQ 3+CM Right 2/11/2021    Procedure: EXCISION BIOPSY TISSUE LESION/MASS UPPER EXTREMITY;  Surgeon: Ramya Loera MD;  Location:  MAIN OR;  Service: General       Family History   Adopted: Yes   Problem Relation Age of Onset    Cancer Mother     Thyroid cancer Sister     Cancer Sister     Cancer Brother          Current Outpatient Medications:     ALPRAZolam (XANAX) 0 25 mg tablet, Take 1 tablet (0 25 mg total) by mouth daily Pt states takes this daily, as needed  , Disp: 30 tablet, Rfl: 0    atorvastatin (LIPITOR) 20 mg tablet, take 1 tablet by mouth once daily, Disp: 90 tablet, Rfl: 1    carvedilol (COREG) 12 5 mg tablet, Take 12 5 mg by mouth 2 (two) times a day , Disp: , Rfl:     escitalopram (LEXAPRO) 20 mg tablet, Take 1 tablet by mouth daily, Disp: , Rfl: 0    Icosapent Ethyl 1 g CAPS, Take 1 capsule by mouth 2 (two) times a day, Disp: , Rfl:     levothyroxine (Euthyrox) 75 mcg tablet, Take 1 tablet (75 mcg total) by mouth daily in the early morning, Disp: 90 tablet, Rfl: 3    omega-3-acid ethyl esters (LOVAZA) 1 g capsule, Take 1 g by mouth 2 (two) times a day, Disp: , Rfl:     sacubitril-valsartan (Entresto)  MG TABS, Take 1 tablet by mouth 2 (two) times a day, Disp: , Rfl:     traZODone (DESYREL) 50 mg tablet, Take 50 mg by mouth daily at bedtime, Disp: , Rfl:     Calcium Carbonate (Caltrate 600) 1500 (600 Ca) MG TABS, Take 1 tablet by mouth 2 (two) times a day, Disp: 180 tablet, Rfl: 0    fluticasone (FLONASE) 50 mcg/act nasal spray, 2 sprays into each nostril daily, Disp: 16 g, Rfl: 2    sacubitril-valsartan (Entresto) 49-51 MG TABS, Take 1 tablet by mouth 2 (two) times a day , Disp: , Rfl:     Allergies   Allergen Reactions    Doxycycline GI Intolerance     Pt states with upset stomach    Amoxicillin GI Intolerance     Upset stomach       Vitals:    04/12/22 1439   BP: 120/70   Pulse: 72   Resp: 16   SpO2: 98%   Weight: 76 7 kg (169 lb)   Height: 5' (1 524 m)

## 2022-06-29 ENCOUNTER — HOSPITAL ENCOUNTER (OUTPATIENT)
Dept: RADIOLOGY | Facility: HOSPITAL | Age: 72
Discharge: HOME | End: 2022-06-29
Attending: FAMILY MEDICINE
Payer: COMMERCIAL

## 2022-06-29 DIAGNOSIS — Z78.0 MENOPAUSE: ICD-10-CM

## 2022-06-29 PROCEDURE — 77080 DXA BONE DENSITY AXIAL: CPT

## 2022-06-30 ENCOUNTER — TELEPHONE (OUTPATIENT)
Dept: FAMILY MEDICINE CLINIC | Facility: CLINIC | Age: 72
End: 2022-06-30

## 2022-06-30 NOTE — RESULT ENCOUNTER NOTE
DEXA scan shows osteopenia, she should continue exercise as much as tolerated and take calcium 1200 mg daily and vitamin-D 1000 unit daily,  please inform the patient

## 2022-06-30 NOTE — TELEPHONE ENCOUNTER
----- Message from Brittany Bell MD sent at 6/30/2022 10:30 AM EDT -----  DEXA scan shows osteopenia, she should continue exercise as much as tolerated and take calcium 1200 mg daily and vitamin-D 1000 unit daily,  please inform the patient

## 2022-07-07 ENCOUNTER — OFFICE VISIT (OUTPATIENT)
Dept: FAMILY MEDICINE CLINIC | Facility: CLINIC | Age: 72
End: 2022-07-07
Payer: COMMERCIAL

## 2022-07-07 VITALS
RESPIRATION RATE: 16 BRPM | BODY MASS INDEX: 33.57 KG/M2 | SYSTOLIC BLOOD PRESSURE: 118 MMHG | WEIGHT: 171 LBS | OXYGEN SATURATION: 97 % | HEART RATE: 74 BPM | DIASTOLIC BLOOD PRESSURE: 76 MMHG | HEIGHT: 60 IN

## 2022-07-07 DIAGNOSIS — E03.9 HYPOTHYROIDISM, UNSPECIFIED TYPE: ICD-10-CM

## 2022-07-07 DIAGNOSIS — E83.51 HYPOCALCEMIA: ICD-10-CM

## 2022-07-07 DIAGNOSIS — Z12.11 SCREEN FOR COLON CANCER: ICD-10-CM

## 2022-07-07 DIAGNOSIS — E78.00 HYPERCHOLESTEROLEMIA: ICD-10-CM

## 2022-07-07 DIAGNOSIS — I10 BENIGN ESSENTIAL HYPERTENSION: ICD-10-CM

## 2022-07-07 DIAGNOSIS — M85.80 OSTEOPENIA, UNSPECIFIED LOCATION: Primary | ICD-10-CM

## 2022-07-07 PROCEDURE — 99214 OFFICE O/P EST MOD 30 MIN: CPT | Performed by: FAMILY MEDICINE

## 2022-07-07 PROCEDURE — 3078F DIAST BP <80 MM HG: CPT | Performed by: FAMILY MEDICINE

## 2022-07-07 PROCEDURE — 1160F RVW MEDS BY RX/DR IN RCRD: CPT | Performed by: FAMILY MEDICINE

## 2022-07-07 PROCEDURE — 3074F SYST BP LT 130 MM HG: CPT | Performed by: FAMILY MEDICINE

## 2022-07-07 NOTE — PROGRESS NOTES
Assessment/Plan:    Problem List Items Addressed This Visit        Endocrine    Hypothyroidism     Continue levothyroxine and order labs for 6 months           Relevant Orders    TSH, 3rd generation       Cardiovascular and Mediastinum    Benign essential hypertension    Relevant Orders    CBC and differential    Comprehensive metabolic panel    Lipid panel    TSH, 3rd generation       Musculoskeletal and Integument    Osteopenia - Primary     Bone density is discussed with her, she will continue taking calcium and vitamin-D daily and will repeat DEXA scan in 2 years              Other    Hypercholesterolemia    Relevant Orders    CBC and differential    Comprehensive metabolic panel    Lipid panel    TSH, 3rd generation    Screen for colon cancer     Discussed with  her about previous fit test was positive and she should get the colonoscopy patient refused, but she is agreeable to recheck on Cologuard  , has  no symptoms           Relevant Orders    Cologuard    Hypocalcemia    Relevant Medications    Calcium Carbonate (Caltrate 600) 1500 (600 Ca) MG TABS          Return in about 6 months (around 1/7/2023)  Chief Complaint   Patient presents with    Follow-up     Review x-rays       Subjective:   Patient ID: Iveth Wilks is a 80 y o  female  Follow-up on bone density, she has osteopenia, she says she is active, she takes calcium and vitamin-D, she denies any headache chest pain shortness of breath, she has cardiomyopathy and follows cardiologist   Blood pressure remains stable, she has slight stuffy nose she use the Flonase   Review of Systems   Constitutional: Negative for activity change, appetite change, chills, fatigue, fever and unexpected weight change  HENT: Negative for congestion, ear discharge, ear pain, nosebleeds, postnasal drip, rhinorrhea, sinus pressure, sneezing, sore throat, trouble swallowing and voice change  Eyes: Negative for photophobia, pain, discharge, redness and itching  Respiratory: Negative for cough, chest tightness, shortness of breath and wheezing  Cardiovascular: Negative for chest pain, palpitations and leg swelling  Gastrointestinal: Negative for abdominal pain, constipation, diarrhea, nausea and vomiting  Endocrine: Negative for polyuria  Genitourinary: Negative for dysuria, frequency and urgency  Musculoskeletal: Negative for arthralgias, back pain, myalgias and neck pain  Skin: Negative for color change, pallor and rash  Allergic/Immunologic: Negative for environmental allergies and food allergies  Neurological: Negative for dizziness, weakness, light-headedness and headaches  Hematological: Negative for adenopathy  Does not bruise/bleed easily  Psychiatric/Behavioral: Negative for behavioral problems  The patient is not nervous/anxious  Objective:  Physical Exam  Vitals and nursing note reviewed  Constitutional:       Appearance: She is well-developed  HENT:      Head: Normocephalic and atraumatic  Right Ear: External ear normal       Left Ear: External ear normal    Eyes:      General: No scleral icterus  Conjunctiva/sclera: Conjunctivae normal       Pupils: Pupils are equal, round, and reactive to light  Neck:      Thyroid: No thyromegaly  Cardiovascular:      Rate and Rhythm: Normal rate and regular rhythm  Heart sounds: Normal heart sounds  No murmur heard  Pulmonary:      Effort: Pulmonary effort is normal       Breath sounds: Normal breath sounds  No wheezing or rales  Abdominal:      General: There is no distension  Palpations: Abdomen is soft  There is no mass  Musculoskeletal:      Cervical back: Normal range of motion and neck supple  Right lower leg: No edema  Left lower leg: No edema  Lymphadenopathy:      Cervical: No cervical adenopathy  Skin:     Findings: No erythema or rash  Neurological:      General: No focal deficit present  Mental Status: She is alert     Psychiatric: Mood and Affect: Mood normal              Past Surgical History:   Procedure Laterality Date    CARDIAC PACEMAKER PLACEMENT  08/22/2012    ICD-Biotronik-Lumax    [place] EXCISION TUMOR SOFT TISSUE SHOULDER SUBQ 3+CM Right 2/11/2021    Procedure: EXCISION BIOPSY TISSUE LESION/MASS UPPER EXTREMITY;  Surgeon: Joaquin Dudley MD;  Location: [facility];  Service: General       Family History   Adopted: Yes   Problem Relation Age of Onset    Cancer Mother     Thyroid cancer Sister     Cancer Sister     Cancer Brother          Current Outpatient Medications:     ALPRAZolam (XANAX) 0 25 mg tablet, Take 1 tablet (0 25 mg total) by mouth daily Pt states takes this daily, as needed  , Disp: 30 tablet, Rfl: 0    atorvastatin (LIPITOR) 20 mg tablet, take 1 tablet by mouth once daily, Disp: 90 tablet, Rfl: 1    Calcium Carbonate (Caltrate 600) 1500 (600 Ca) MG TABS, Take 1 tablet by mouth 2 (two) times a day, Disp: 180 tablet, Rfl: 3    carvedilol (COREG) 12 5 mg tablet, Take 12 5 mg by mouth 2 (two) times a day , Disp: , Rfl:     escitalopram (LEXAPRO) 20 mg tablet, Take 1 tablet by mouth daily, Disp: , Rfl: 0    fluticasone (FLONASE) 50 mcg/act nasal spray, 2 sprays into each nostril daily, Disp: 16 g, Rfl: 2    Icosapent Ethyl 1 g CAPS, Take 1 capsule by mouth 2 (two) times a day, Disp: , Rfl:     levothyroxine (Euthyrox) 75 mcg tablet, Take 1 tablet (75 mcg total) by mouth daily in the early morning, Disp: 90 tablet, Rfl: 3    omega-3-acid ethyl esters (LOVAZA) 1 g capsule, Take 1 g by mouth 2 (two) times a day, Disp: , Rfl:     sacubitril-valsartan (Entresto)  MG TABS, Take 1 tablet by mouth 2 (two) times a day, Disp: , Rfl:     traZODone (DESYREL) 50 mg tablet, Take 50 mg by mouth daily at bedtime, Disp: , Rfl:     sacubitril-valsartan (Entresto) 49-51 MG TABS, Take 1 tablet by mouth 2 (two) times a day  (Patient not taking: Reported on 7/7/2022), Disp: , Rfl:     Allergies   Allergen Reactions    Doxycycline GI Intolerance     Pt states with upset stomach    Amoxicillin GI Intolerance     Upset stomach       Vitals:    07/07/22 1420   BP: 118/76   BP Location: Right arm   Patient Position: Sitting   Cuff Size: Standard   Pulse: 74   Resp: 16   SpO2: 97%   Weight: 77 6 kg (171 lb)   Height: 5' (1 524 m)

## 2022-07-07 NOTE — ASSESSMENT & PLAN NOTE
Discussed with  her about previous fit test was positive and she should get the colonoscopy patient refused, but she is agreeable to recheck on Cologuard  , has  no symptoms

## 2022-07-07 NOTE — ASSESSMENT & PLAN NOTE
Bone density is discussed with her, she will continue taking calcium and vitamin-D daily and will repeat DEXA scan in 2 years

## 2022-07-22 ENCOUNTER — RA CDI HCC (OUTPATIENT)
Dept: OTHER | Facility: HOSPITAL | Age: 72
End: 2022-07-22

## 2022-07-22 NOTE — PROGRESS NOTES
[facility]  coding opportunities       Chart reviewed, no opportunity found: [address]  Patients Insurance     Medicare Insurance: [employer]

## 2022-08-30 ENCOUNTER — TELEPHONE (OUTPATIENT)
Dept: FAMILY MEDICINE CLINIC | Facility: CLINIC | Age: 72
End: 2022-08-30

## 2022-08-30 DIAGNOSIS — R19.5 POSITIVE FIT (FECAL IMMUNOCHEMICAL TEST): Primary | ICD-10-CM

## 2022-08-30 NOTE — PROGRESS NOTES
Positive fit test, previously she has abnormal Cologuard test and she has not gone for colonoscopy yet, will refer her for colonoscopy

## 2022-08-30 NOTE — TELEPHONE ENCOUNTER
Pt had a pos fit test  Pos in 2020, pt was instructed to see GI but never went  I called pt and  stressed the importance of her going to have a colonoscopy

## 2022-09-08 ENCOUNTER — TELEPHONE (OUTPATIENT)
Dept: OTHER | Facility: OTHER | Age: 72
End: 2022-09-08

## 2022-09-08 NOTE — TELEPHONE ENCOUNTER
Caren called today regarding a Positive Stool Test and would like to set up a Colonoscopy Consult  Please call Patient back to Schedule

## 2022-09-14 ENCOUNTER — TELEPHONE (OUTPATIENT)
Dept: GASTROENTEROLOGY | Facility: CLINIC | Age: 72
End: 2022-09-14

## 2022-09-14 ENCOUNTER — OFFICE VISIT (OUTPATIENT)
Dept: GASTROENTEROLOGY | Facility: CLINIC | Age: 72
End: 2022-09-14
Payer: COMMERCIAL

## 2022-09-14 VITALS
HEIGHT: 60 IN | BODY MASS INDEX: 32 KG/M2 | HEART RATE: 74 BPM | SYSTOLIC BLOOD PRESSURE: 106 MMHG | DIASTOLIC BLOOD PRESSURE: 85 MMHG | WEIGHT: 163 LBS

## 2022-09-14 DIAGNOSIS — R19.5 POSITIVE COLORECTAL CANCER SCREENING USING COLOGUARD TEST: Primary | ICD-10-CM

## 2022-09-14 DIAGNOSIS — K62.5 BRBPR (BRIGHT RED BLOOD PER RECTUM): ICD-10-CM

## 2022-09-14 PROCEDURE — 99203 OFFICE O/P NEW LOW 30 MIN: CPT | Performed by: NURSE PRACTITIONER

## 2022-09-14 PROCEDURE — 3074F SYST BP LT 130 MM HG: CPT | Performed by: NURSE PRACTITIONER

## 2022-09-14 PROCEDURE — 3079F DIAST BP 80-89 MM HG: CPT | Performed by: NURSE PRACTITIONER

## 2022-09-14 PROCEDURE — 1160F RVW MEDS BY RX/DR IN RCRD: CPT | Performed by: NURSE PRACTITIONER

## 2022-09-14 NOTE — PROGRESS NOTES
[facility] [address] [facility] - Outpatient Consultation  Tyrell Meier 80 y o  female MRN: [id number]  Encounter: [id number]          ASSESSMENT AND PLAN:      1  Positive colorectal cancer screening using Cologuard test  Patient reports positive Cologuard test   Unable to find report in system  No family history of colon cancer  Patient has never had a colonoscopy done  Patient reports she will be unable to tolerate GoLYTELY bowel prep  -MiraLax Dulcolax bowel prep for colonoscopy  - Colonoscopy; schedule for colonoscopy for further evaluation of positive Cologuard test   -Patient follows with Dr Jayjay Delcid in will need cardiac clearance prior to procedure  2  BRBPR (bright red blood per rectum)  Patient reported intermittent episodes of bright red blood from rectal area when she denied  Patient had recent fecal immunochemical test which was positive for blood  Bright red blood from rectal area may be secondary to hemorrhoids, abdomen, ulceration, or lesion    -Colonoscopy for further evaluation of bright red blood from rectal area  -Patient defers EGD for evaluation positive FIT  Follow-up will be determined by physician after colonoscopy   ______________________________________________________________________    HPI:  Coco Holland is 73-year-old female with past medical history of CAD, hypertension, hyperthyroid, pacemaker placed 2007 who presents to office with positive Cologuard test and positive FIT  Patient denies nausea, vomiting, acid reflux, heartburn, dysphagia, epigastric or abdominal pain  Patient denies blood in stool or black tarry stool  Patient reports intermittent episodes of small amount of blood from rectal area when she wipes  Patient reports bowel patterns are regular  Abdomen exam benign no guarding or tenderness  Patient never had colonoscopy or EGD  Lab work done 05/04/2022 shows hemoglobin 13 6 and within normal limits    Patient follows with Dr Jayjay Delcid cardiologist   Patient does not smoke  No family history of gastric or colon cancer  REVIEW OF SYSTEMS:    CONSTITUTIONAL: Denies any fever, chills, rigors, and weight loss  HEENT: No earache or tinnitus  Denies hearing loss or visual disturbances  CARDIOVASCULAR: No chest pain or palpitations  RESPIRATORY: Denies any cough, hemoptysis, shortness of breath or dyspnea on exertion  GASTROINTESTINAL: As noted in the History of Present Illness  GENITOURINARY: No problems with urination  Denies any hematuria or dysuria  NEUROLOGIC: No dizziness or vertigo, denies headaches  MUSCULOSKELETAL: Denies any muscle or joint pain  SKIN: Denies skin rashes or itching  ENDOCRINE: Denies excessive thirst  Denies intolerance to heat or cold  PSYCHOSOCIAL: Denies depression or anxiety  Denies any recent memory loss  Historical Information   Past Medical History:   Diagnosis Date    Anxiety     Cardiac pacemaker     Has had pacemaker since 2007, new pacemaker implanted this past fall 9/2020   Herpes zoster     onset: 12/6/10    History of urinary tract infection     Pt states with UTI back in December- treated with ATB- no further symptoms at this time      Hyperlipidemia     Hypertension     Hypothyroidism     onset: 1/31/12    PONV (postoperative nausea and vomiting)     Vertigo     onset: 1/31/12     Past Surgical History:   Procedure Laterality Date    CARDIAC PACEMAKER PLACEMENT  08/22/2012    ICD-Biotronik-Lumax    [place] EXCISION TUMOR SOFT TISSUE SHOULDER SUBQ 3+CM Right 2/11/2021    Procedure: EXCISION BIOPSY TISSUE LESION/MASS UPPER EXTREMITY;  Surgeon: Becki Ng MD;  Location:  MAIN OR;  Service: General     Social History   Social History     Substance and Sexual Activity   Alcohol Use No    Comment: Denies      Social History     Substance and Sexual Activity   Drug Use No    Comment: Denies      Social History     Tobacco Use   Smoking Status Never Smoker   Smokeless Tobacco Never Used   Tobacco Comment    Denies      Family History   Adopted: Yes   Problem Relation Age of Onset    Cancer Mother     Thyroid cancer Sister     Cancer Sister     Cancer Brother        Meds/Allergies       Current Outpatient Medications:     ALPRAZolam (XANAX) 0 25 mg tablet    atorvastatin (LIPITOR) 20 mg tablet    Calcium Carbonate (Caltrate 600) 1500 (600 Ca) MG TABS    carvedilol (COREG) 12 5 mg tablet    escitalopram (LEXAPRO) 20 mg tablet    fluticasone (FLONASE) 50 mcg/act nasal spray    Icosapent Ethyl 1 g CAPS    levothyroxine (Euthyrox) 75 mcg tablet    sacubitril-valsartan (Entresto) 49-51 MG TABS    sacubitril-valsartan (Entresto)  MG TABS    traZODone (DESYREL) 50 mg tablet    omega-3-acid ethyl esters (LOVAZA) 1 g capsule    Allergies   Allergen Reactions    Doxycycline GI Intolerance     Pt states with upset stomach    Amoxicillin GI Intolerance     Upset stomach           Objective     Blood pressure 106/85, pulse 74, height 5' (1 524 m), weight 73 9 kg (163 lb)  Body mass index is 31 83 kg/m²  PHYSICAL EXAM:      General Appearance:   Alert, cooperative, no distress   HEENT:   Normocephalic, atraumatic, anicteric      Neck:  Supple, symmetrical, trachea midline   Lungs:   Clear to auscultation bilaterally; no rales, rhonchi or wheezing; respirations unlabored    Heart[de-identified]   Regular rate and rhythm; no murmur, rub, or gallop  Abdomen:   Soft, non-tender, non-distended; normal bowel sounds; no masses, no organomegaly    Genitalia:   Deferred    Rectal:   Deferred    Extremities:  No cyanosis, clubbing or edema    Pulses:  2+ and symmetric    Skin:  No jaundice, rashes, or lesions    Lymph nodes:  No palpable cervical lymphadenopathy        Lab Results:   No visits with results within 1 Day(s) from this visit     Latest known visit with results is:   Admission on 02/11/2021, Discharged on 02/11/2021   Component Date Value    Case Report 02/11/2021 Value:Surgical Pathology Report                         Case: [id number]                                   Authorizing Provider:  Nneka Hamm MD          Collected:           02/11/2021 3418              Ordering Location:     [facility]   Received:            02/11/2021 1257                                     Operating Room                                                               Pathologist:           Edouard Loza MD                                                   Specimen:    Soft Tissue, Lipoma, Lipoma Right Subcuraneous Mass                                        Final Diagnosis 02/11/2021                      Value: This result contains rich text formatting which cannot be displayed here   Additional Information 02/11/2021                      Value: This result contains rich text formatting which cannot be displayed here  Clifm Tejas Gross Description 02/11/2021                      Value: This result contains rich text formatting which cannot be displayed here  Radiology Results:   No results found

## 2022-09-14 NOTE — TELEPHONE ENCOUNTER
Scheduled date of colonoscopy (as of today): 9/29/22  Physician performing colonoscopy: Dr Lance Call   Location of colonoscopy: [facility]  Bowel prep reviewed with patient: Miralax w/ dul   Instructions reviewed with patient by: ls   Clearances:  Will fax cardiac clearance to Dr Meño Nascimento for pt's procedure scheduled on 9/29/22

## 2022-09-14 NOTE — TELEPHONE ENCOUNTER
[facility] Assessment    Name: Omer Evans  YOB: 1950  Last Height: 5' (1 524 m)  Last weight: 73 9 kg (163 lb)  BMI: 31 83 kg/m²  Procedure: Colon  Diagnosis: see order  Date of procedure: 9/29/22  Prep: miralax w/ dul  Responsible : yes  Phone#: [phone]  Name completing form: Micah King  Date form completed: 09/14/22      If the patient answers yes to any of these questions, schedule in a hospital  Are you pregnant: No  Do you rely on a wheelchair for mobility: No  Have you been diagnosed with End Stage Renal Disease (ESRD): No  Do you need oxygen during the day: No  Have you had a heart attack or stroke within the past three months: No  Have you had a seizure within the past three months: No  Have you ever been informed by anesthesia that you have a difficult airway: No  Additional Questions  Have you had any cardiac testing or are under the care of a Cardiologist (see cardiac list): Yes (Comment: Obtain Cardiac Clearance)  Cardiac list:   Do you have an implanted cardiac defibrillator: No (Comment:  This patient should be scheduled in the hospital)    Have any bleeding problems, such as anemia or hemophilia (If patient has H&H result below 8, schedule in hospital   H&H must be within 30 days of procedure): No    Had an organ transplant within the past 3 months: No    Do you have any present infections: No  Do you get short of breath when walking a few blocks: No  Have you been diagnosed with diabetes: No  Comments (provide cardiac provider information if applicable): Dr Wilkes Rater, no blood thinners

## 2022-09-14 NOTE — H&P (VIEW-ONLY)
[address] [facility] - Outpatient Consultation  Champ Meier 80 y o  female MRN: [id number]  Encounter: [id number]          ASSESSMENT AND PLAN:      1  Positive colorectal cancer screening using Cologuard test  Patient reports positive Cologuard test   Unable to find report in system  No family history of colon cancer  Patient has never had a colonoscopy done  Patient reports she will be unable to tolerate GoLYTELY bowel prep  -MiraLax Dulcolax bowel prep for colonoscopy  - Colonoscopy; schedule for colonoscopy for further evaluation of positive Cologuard test   -Patient follows with Dr Jesenia Villa in will need cardiac clearance prior to procedure  2  BRBPR (bright red blood per rectum)  Patient reported intermittent episodes of bright red blood from rectal area when she denied  Patient had recent fecal immunochemical test which was positive for blood  Bright red blood from rectal area may be secondary to hemorrhoids, abdomen, ulceration, or lesion    -Colonoscopy for further evaluation of bright red blood from rectal area  -Patient defers EGD for evaluation positive FIT  Follow-up will be determined by physician after colonoscopy   ______________________________________________________________________    HPI:  Nery Donovan is 43-year-old female with past medical history of CAD, hypertension, hyperthyroid, pacemaker placed 2007 who presents to office with positive Cologuard test and positive FIT  Patient denies nausea, vomiting, acid reflux, heartburn, dysphagia, epigastric or abdominal pain  Patient denies blood in stool or black tarry stool  Patient reports intermittent episodes of small amount of blood from rectal area when she wipes  Patient reports bowel patterns are regular  Abdomen exam benign no guarding or tenderness  Patient never had colonoscopy or EGD  Lab work done 05/04/2022 shows hemoglobin 13 6 and within normal limits    Patient follows with Dr Jesenia Villa cardiologist   Patient does not smoke  No family history of gastric or colon cancer  REVIEW OF SYSTEMS:    CONSTITUTIONAL: Denies any fever, chills, rigors, and weight loss  HEENT: No earache or tinnitus  Denies hearing loss or visual disturbances  CARDIOVASCULAR: No chest pain or palpitations  RESPIRATORY: Denies any cough, hemoptysis, shortness of breath or dyspnea on exertion  GASTROINTESTINAL: As noted in the History of Present Illness  GENITOURINARY: No problems with urination  Denies any hematuria or dysuria  NEUROLOGIC: No dizziness or vertigo, denies headaches  MUSCULOSKELETAL: Denies any muscle or joint pain  SKIN: Denies skin rashes or itching  ENDOCRINE: Denies excessive thirst  Denies intolerance to heat or cold  PSYCHOSOCIAL: Denies depression or anxiety  Denies any recent memory loss  Historical Information   Past Medical History:   Diagnosis Date    Anxiety     Cardiac pacemaker     Has had pacemaker since 2007, new pacemaker implanted this past fall 9/2020   Herpes zoster     onset: 12/6/10    History of urinary tract infection     Pt states with UTI back in December- treated with ATB- no further symptoms at this time      Hyperlipidemia     Hypertension     Hypothyroidism     onset: 1/31/12    PONV (postoperative nausea and vomiting)     Vertigo     onset: 1/31/12     Past Surgical History:   Procedure Laterality Date    CARDIAC PACEMAKER PLACEMENT  08/22/2012    ICD-Biotronik-Lumax    DE EXCISION TUMOR SOFT TISSUE SHOULDER SUBQ 3+CM Right 2/11/2021    Procedure: EXCISION BIOPSY TISSUE LESION/MASS UPPER EXTREMITY;  Surgeon: Nneka Hamm MD;  Location:  MAIN OR;  Service: General     Social History   Social History     Substance and Sexual Activity   Alcohol Use No    Comment: Denies      Social History     Substance and Sexual Activity   Drug Use No    Comment: Denies      Social History     Tobacco Use   Smoking Status Never Smoker   Smokeless Tobacco Never Used   Tobacco Comment    Denies      Family History   Adopted: Yes   Problem Relation Age of Onset    Cancer Mother     Thyroid cancer Sister     Cancer Sister     Cancer Brother        Meds/Allergies       Current Outpatient Medications:     ALPRAZolam (XANAX) 0 25 mg tablet    atorvastatin (LIPITOR) 20 mg tablet    Calcium Carbonate (Caltrate 600) 1500 (600 Ca) MG TABS    carvedilol (COREG) 12 5 mg tablet    escitalopram (LEXAPRO) 20 mg tablet    fluticasone (FLONASE) 50 mcg/act nasal spray    Icosapent Ethyl 1 g CAPS    levothyroxine (Euthyrox) 75 mcg tablet    sacubitril-valsartan (Entresto) 49-51 MG TABS    sacubitril-valsartan (Entresto)  MG TABS    traZODone (DESYREL) 50 mg tablet    omega-3-acid ethyl esters (LOVAZA) 1 g capsule    Allergies   Allergen Reactions    Doxycycline GI Intolerance     Pt states with upset stomach    Amoxicillin GI Intolerance     Upset stomach           Objective     Blood pressure 106/85, pulse 74, height 5' (1 524 m), weight 73 9 kg (163 lb)  Body mass index is 31 83 kg/m²  PHYSICAL EXAM:      General Appearance:   Alert, cooperative, no distress   HEENT:   Normocephalic, atraumatic, anicteric      Neck:  Supple, symmetrical, trachea midline   Lungs:   Clear to auscultation bilaterally; no rales, rhonchi or wheezing; respirations unlabored    Heart[de-identified]   Regular rate and rhythm; no murmur, rub, or gallop  Abdomen:   Soft, non-tender, non-distended; normal bowel sounds; no masses, no organomegaly    Genitalia:   Deferred    Rectal:   Deferred    Extremities:  No cyanosis, clubbing or edema    Pulses:  2+ and symmetric    Skin:  No jaundice, rashes, or lesions    Lymph nodes:  No palpable cervical lymphadenopathy        Lab Results:   No visits with results within 1 Day(s) from this visit     Latest known visit with results is:   Admission on 02/11/2021, Discharged on 02/11/2021   Component Date Value    Case Report 02/11/2021 Value:Surgical Pathology Report                         Case: [id number]                                   Authorizing Provider:  Marlen Zhang MD          Collected:           02/11/2021 [id number]              Ordering Location:     [place] [address]   Received:            02/11/2021 1257                                     Operating Room                                                               Pathologist:           Zara Siemens, MD                                                   Specimen:    Soft Tissue, Lipoma, Lipoma Right Subcuraneous Mass                                        Final Diagnosis 02/11/2021                      Value: This result contains rich text formatting which cannot be displayed here   Additional Information 02/11/2021                      Value: This result contains rich text formatting which cannot be displayed here  Nettie David Gross Description 02/11/2021                      Value: This result contains rich text formatting which cannot be displayed here  Radiology Results:   No results found

## 2022-09-15 NOTE — TELEPHONE ENCOUNTER
Faxed cardiac clearance to Dr Kirsty Rain [phone]  Will call their office in a few days to make sure received [phone]

## 2022-09-16 NOTE — TELEPHONE ENCOUNTER
Received cardiac clearance back from Dr Ila Enamorado  Pt is cleared for procedure, however, it is advised that pt is scheduled at a hospital setting  Also received message from [facility] informing pt needs to go to hospital due to insurance  Will call pt to reschedule

## 2022-09-16 NOTE — TELEPHONE ENCOUNTER
I lmom for pt to please call back to r/s her procedure due to ins being oon at [facility] and because her cardiologist would like her to have done in a hospital setting  Will call pt again in one week if do not hear back from her

## 2022-09-19 NOTE — TELEPHONE ENCOUNTER
Called and lmom asking pt to call back to reschedule procedure at hospital  Will try calling again in a few days  If she calls back, please get her rescheduled  Thank you

## 2022-09-19 NOTE — TELEPHONE ENCOUNTER
Patients GI provider:  Elvia Stratton     Number to return call: [phone]     Reason for call: Pt calling returning a call to reschedule colonoscopy     Scheduled procedure/appointment date if applicable: Apt/procedure n /a

## 2022-09-22 NOTE — TELEPHONE ENCOUNTER
LMOM need to reschedule procedure at hospital  If pt calls back please schedule  I will follow up in a few days

## 2022-09-22 NOTE — TELEPHONE ENCOUNTER
Patient called and rescheduled her colonoscopy for 10/4/22 with Dr Ni Moore  She had a few days to choose from due to transportation by her daughter  Pt states she has prep and instructions  Thank you!

## 2022-09-28 ENCOUNTER — TELEPHONE (OUTPATIENT)
Dept: GASTROENTEROLOGY | Facility: CLINIC | Age: 72
End: 2022-09-28

## 2022-09-28 NOTE — TELEPHONE ENCOUNTER
I lmom confirming pt's colonoscopy scheduled on 10/4/22 at [facility] with Dr Hallie Trivedi  Informed [facility] would be calling day prior with arrival time  Informed of clear liquid diet the day prior as well as the bowel cleansing preparation  Informed would need a  the day of the procedure due to being under sedation  I asked pt to please call back if has not received instructions or if has any questions  Advised pt to contact insurance if has any questions regarding coverage of procedure

## 2022-09-29 ENCOUNTER — TELEPHONE (OUTPATIENT)
Dept: OTHER | Facility: OTHER | Age: 72
End: 2022-09-29

## 2022-09-29 NOTE — TELEPHONE ENCOUNTER
I called and spoke to patient and went over prep instructions  I advised patient she can drink propel and gatorade  Patient verbalized understanding   Thank you

## 2022-09-29 NOTE — TELEPHONE ENCOUNTER
Patient called in prior to her colonoscopy on 10/4/22 inquiring if she can mix her Miralax with Propel water instead of Gatorade  Please follow up with patient

## 2022-10-03 ENCOUNTER — TELEPHONE (OUTPATIENT)
Dept: GASTROENTEROLOGY | Facility: CLINIC | Age: 72
End: 2022-10-03

## 2022-10-03 NOTE — TELEPHONE ENCOUNTER
Patients GI provider:  Dr Anjelica Smith    Number to return call: [phone]    Reason for call: Pt is scheduled for colonoscopy tomorrow and cannot keep prep down       Scheduled procedure/appointment date if applicable: procedure 00/7

## 2022-10-04 ENCOUNTER — HOSPITAL ENCOUNTER (OUTPATIENT)
Dept: GASTROENTEROLOGY | Facility: HOSPITAL | Age: 72
Setting detail: OUTPATIENT SURGERY
Discharge: HOME | End: 2022-10-04
Payer: COMMERCIAL

## 2022-10-04 ENCOUNTER — ANESTHESIA EVENT (OUTPATIENT)
Dept: GASTROENTEROLOGY | Facility: HOSPITAL | Age: 72
End: 2022-10-04

## 2022-10-04 ENCOUNTER — ANESTHESIA (OUTPATIENT)
Dept: GASTROENTEROLOGY | Facility: HOSPITAL | Age: 72
End: 2022-10-04

## 2022-10-04 VITALS
DIASTOLIC BLOOD PRESSURE: 62 MMHG | OXYGEN SATURATION: 98 % | RESPIRATION RATE: 12 BRPM | SYSTOLIC BLOOD PRESSURE: 125 MMHG | TEMPERATURE: 97.7 F | HEART RATE: 75 BPM

## 2022-10-04 DIAGNOSIS — R19.5 POSITIVE COLORECTAL CANCER SCREENING USING COLOGUARD TEST: ICD-10-CM

## 2022-10-04 PROCEDURE — 45380 COLONOSCOPY AND BIOPSY: CPT | Performed by: INTERNAL MEDICINE

## 2022-10-04 PROCEDURE — 88305 TISSUE EXAM BY PATHOLOGIST: CPT | Performed by: PATHOLOGY

## 2022-10-04 PROCEDURE — 45385 COLONOSCOPY W/LESION REMOVAL: CPT | Performed by: INTERNAL MEDICINE

## 2022-10-04 RX ORDER — PROPOFOL 10 MG/ML
INJECTION, EMULSION INTRAVENOUS AS NEEDED
Status: DISCONTINUED | OUTPATIENT
Start: 2022-10-04 | End: 2022-10-04

## 2022-10-04 RX ORDER — SODIUM CHLORIDE, SODIUM LACTATE, POTASSIUM CHLORIDE, CALCIUM CHLORIDE 600; 310; 30; 20 MG/100ML; MG/100ML; MG/100ML; MG/100ML
INJECTION, SOLUTION INTRAVENOUS CONTINUOUS PRN
Status: DISCONTINUED | OUTPATIENT
Start: 2022-10-04 | End: 2022-10-04

## 2022-10-04 RX ADMIN — PROPOFOL 30 MG: 10 INJECTION, EMULSION INTRAVENOUS at 12:04

## 2022-10-04 RX ADMIN — PROPOFOL 50 MG: 10 INJECTION, EMULSION INTRAVENOUS at 12:24

## 2022-10-04 RX ADMIN — PROPOFOL 100 MG: 10 INJECTION, EMULSION INTRAVENOUS at 11:56

## 2022-10-04 RX ADMIN — SODIUM CHLORIDE, SODIUM LACTATE, POTASSIUM CHLORIDE, AND CALCIUM CHLORIDE: .6; .31; .03; .02 INJECTION, SOLUTION INTRAVENOUS at 11:55

## 2022-10-04 RX ADMIN — PROPOFOL 50 MG: 10 INJECTION, EMULSION INTRAVENOUS at 12:08

## 2022-10-04 RX ADMIN — PROPOFOL 50 MG: 10 INJECTION, EMULSION INTRAVENOUS at 12:03

## 2022-10-04 RX ADMIN — PROPOFOL 30 MG: 10 INJECTION, EMULSION INTRAVENOUS at 12:15

## 2022-10-04 RX ADMIN — PROPOFOL 40 MG: 10 INJECTION, EMULSION INTRAVENOUS at 12:20

## 2022-10-04 RX ADMIN — PROPOFOL 50 MG: 10 INJECTION, EMULSION INTRAVENOUS at 11:59

## 2022-10-04 NOTE — ANESTHESIA POSTPROCEDURE EVALUATION
Post-Op Assessment Note    CV Status:  Stable    Pain management: adequate     Mental Status:  Sleepy   Hydration Status:  Euvolemic   PONV Controlled:  Controlled   Airway Patency:  Patent      Post Op Vitals Reviewed: Yes      Staff: CRNA         No complications documented      BP   114/59   Temp     Pulse  89   Resp   20   SpO2   96

## 2022-10-04 NOTE — ANESTHESIA PREPROCEDURE EVALUATION
Procedure:  COLONOSCOPY    Relevant Problems   ANESTHESIA   (+) PONV (postoperative nausea and vomiting)      CARDIO   (+) Benign essential hypertension   (+) Cardiac pacemaker   (+) Hypercholesterolemia      ENDO   (+) Hypothyroidism   (-) Diabetes mellitus, type 2 (HCC)      GI/HEPATIC   (+) BRBPR (bright red blood per rectum)   (-) Gastroesophageal reflux disease      /RENAL (within normal limits)      GYN (within normal limits)      HEMATOLOGY (within normal limits)      MUSCULOSKELETAL (within normal limits)      NEURO/PSYCH   (+) Anxiety      PULMONARY   (-) Asthma   (-) Sleep apnea        Physical Exam    Airway    Mallampati score: II  TM Distance: >3 FB  Neck ROM: full     Dental   upper dentures and lower dentures,     Cardiovascular      Pulmonary      Other Findings        Anesthesia Plan  ASA Score- 4     Anesthesia Type- IV sedation with anesthesia with ASA Monitors  Additional Monitors:   Airway Plan:           Plan Factors-Exercise tolerance (METS): >4 METS  Chart reviewed  EKG reviewed  Existing labs reviewed  Patient summary reviewed  Patient is not a current smoker  Induction- intravenous  Postoperative Plan-     Informed Consent- Anesthetic plan and risks discussed with patient  I personally reviewed this patient with the CRNA  Discussed and agreed on the Anesthesia Plan with the CRNA  Sneha Castellanos

## 2022-10-04 NOTE — PERIOPERATIVE NURSING NOTE
Time out Performed at [id number]  Time Charted in Time Out error   [address] [facility]

## 2022-10-11 PROBLEM — Z12.11 SCREEN FOR COLON CANCER: Status: RESOLVED | Noted: 2019-10-30 | Resolved: 2022-10-11

## 2022-10-12 PROCEDURE — 88305 TISSUE EXAM BY PATHOLOGIST: CPT | Performed by: PATHOLOGY

## 2022-10-12 NOTE — RESULT ENCOUNTER NOTE
Please inform patient that their biopsies were benign    Repeat colonoscopy 3 years due to benign adenomas

## 2022-10-20 ENCOUNTER — OFFICE VISIT (OUTPATIENT)
Dept: FAMILY MEDICINE CLINIC | Facility: CLINIC | Age: 72
End: 2022-10-20
Payer: COMMERCIAL

## 2022-10-20 VITALS
HEART RATE: 71 BPM | OXYGEN SATURATION: 97 % | DIASTOLIC BLOOD PRESSURE: 80 MMHG | HEIGHT: 60 IN | RESPIRATION RATE: 16 BRPM | WEIGHT: 165 LBS | SYSTOLIC BLOOD PRESSURE: 124 MMHG | BODY MASS INDEX: 32.39 KG/M2

## 2022-10-20 DIAGNOSIS — E78.00 HYPERCHOLESTEROLEMIA: ICD-10-CM

## 2022-10-20 DIAGNOSIS — Z23 NEEDS FLU SHOT: ICD-10-CM

## 2022-10-20 DIAGNOSIS — Z95.0 CARDIAC PACEMAKER: ICD-10-CM

## 2022-10-20 DIAGNOSIS — I10 BENIGN ESSENTIAL HYPERTENSION: Primary | ICD-10-CM

## 2022-10-20 DIAGNOSIS — E03.9 HYPOTHYROIDISM, UNSPECIFIED TYPE: ICD-10-CM

## 2022-10-20 DIAGNOSIS — I42.9 CARDIOMYOPATHY, UNSPECIFIED TYPE (HCC): ICD-10-CM

## 2022-10-20 PROBLEM — E78.2 MIXED DYSLIPIDEMIA: Status: ACTIVE | Noted: 2019-10-29

## 2022-10-20 PROBLEM — I50.22 CHRONIC SYSTOLIC CHF (CONGESTIVE HEART FAILURE) (HCC): Status: ACTIVE | Noted: 2021-04-19

## 2022-10-20 PROCEDURE — G0008 ADMIN INFLUENZA VIRUS VAC: HCPCS | Performed by: FAMILY MEDICINE

## 2022-10-20 PROCEDURE — 90662 IIV NO PRSV INCREASED AG IM: CPT | Performed by: FAMILY MEDICINE

## 2022-10-20 PROCEDURE — 99214 OFFICE O/P EST MOD 30 MIN: CPT | Performed by: FAMILY MEDICINE

## 2022-10-20 RX ORDER — TRAZODONE HYDROCHLORIDE 100 MG/1
100 TABLET ORAL
COMMUNITY
Start: 2022-10-06

## 2022-10-20 NOTE — ASSESSMENT & PLAN NOTE
Lab Results   Component Value Date    [id number] 3 10 11/02/2017    TSH 2 36 10/23/2019   Continue same dose of levothyroxine  Recheck labs in 6 months

## 2022-10-20 NOTE — ASSESSMENT & PLAN NOTE
She is doing well, she has pacemaker in the left side, she follows cardiologist she says since she has pacemaker replace she has slight swelling in the left upper arm has been there for 4 years and is not increasing she feels slight discomfort but no acute new changes

## 2022-10-20 NOTE — PROGRESS NOTES
Name: Lupe Matson      : 1950      MRN: [id number]  Encounter Provider: Bharath Saenz MD  Encounter Date: 10/20/2022   Encounter department: [facility]     1  Benign essential hypertension  Assessment & Plan:  Hypertension is stable, continue low-salt diet, continue same medications  2  Needs flu shot  -     influenza vaccine, high-dose, PF 0 7 mL (FLUZONE HIGH-DOSE)    3  Cardiac pacemaker    4  Hypothyroidism, unspecified type  Assessment & Plan:  Lab Results   Component Value Date    [id number] 3 10 2017    TSH 2 36 10/23/2019   Continue same dose of levothyroxine  Recheck labs in 6 months       Orders:  -     TSH, 3rd generation; Future; Expected date: 2023    5  Cardiomyopathy, unspecified type [facility])  Assessment & Plan:  She is doing well, she has pacemaker in the left side, she follows cardiologist she says since she has pacemaker replace she has slight swelling in the left upper arm has been there for 4 years and is not increasing she feels slight discomfort but no acute new changes    Orders:  -     CBC and differential; Future; Expected date: 2023  -     Comprehensive metabolic panel; Future; Expected date: 2023  -     Lipid panel; Future; Expected date: 2023    6  Hypercholesterolemia  Assessment & Plan:  Lab Results   Component Value Date     [address] 10/23/2019   She is doing well with Lipitor      Orders:  -     Lipid panel; Future; Expected date: 2023        Depression Screening and Follow-up Plan: Patient was screened for depression during today's encounter  They screened negative with a PHQ-2 score of 0      Left upper arm mild discomfort and increased size compared to the right arm, since she had change in the pacemaker as there was note of difficulty in changing the pacemaker and leslye muscle cutting was done, discussed with RN could be due to that sometimes he developed lymphedema, but there is no mass palpable in the upper arm and she has normal range of motion,    Subjective     She is here for follow-up, she had her labs she follows cardiologist for cardiomyopathy and congestive heart failure which has been stable, she had a pacemaker and she says it was difficult to put a new pacemaker as cardiologist told her there was some effort involved, since then she has for last few years some discomfort in the left upper arm and slightly increased size of left forearm but there is no new changes no new pain or swelling  She takes levothyroxine and she takes calcium vitamin-D daily she had recent colonoscopy which shows that she had polyps which were removed and advised to have colonoscopy after 3 years,  She is interested in the flu vaccine and she really appreciated that she was in for colonoscopy    Review of Systems   Constitutional: Negative for activity change, appetite change, chills, fatigue, fever and unexpected weight change  HENT: Negative for congestion, ear discharge, ear pain, nosebleeds, postnasal drip, rhinorrhea, sinus pressure, sneezing, sore throat, trouble swallowing and voice change  Eyes: Negative for photophobia, pain, discharge, redness and itching  Respiratory: Negative for cough, chest tightness, shortness of breath and wheezing  Cardiovascular: Negative for chest pain, palpitations and leg swelling  Gastrointestinal: Negative for abdominal pain, constipation, diarrhea, nausea and vomiting  Endocrine: Negative for polyuria  Genitourinary: Negative for dysuria, frequency and urgency  Musculoskeletal: Negative for arthralgias, back pain, myalgias and neck pain  Skin: Negative for color change, pallor and rash  Allergic/Immunologic: Negative for environmental allergies and food allergies  Neurological: Negative for dizziness, weakness, light-headedness and headaches  Hematological: Negative for adenopathy  Does not bruise/bleed easily     Psychiatric/Behavioral: Negative for behavioral problems  The patient is not nervous/anxious  Past Medical History:   Diagnosis Date   • Anxiety    • Cardiac pacemaker     Has had pacemaker since , new pacemaker implanted this past fall 2020  • Herpes zoster     onset: 12/6/10   • History of urinary tract infection     Pt states with UTI back in December- treated with ATB- no further symptoms at this time     • Hyperlipidemia    • Hypertension    • Hypothyroidism     onset: 13   • PONV (postoperative nausea and vomiting)    • Vertigo     onset: 13     Past Surgical History:   Procedure Laterality Date   • CARDIAC PACEMAKER PLACEMENT  2012    ICD-Biotronik-Lumax   •  SECTION     • CA EXCISION TUMOR SOFT TISSUE SHOULDER SUBQ 3+CM Right 2021    Procedure: EXCISION BIOPSY TISSUE LESION/MASS UPPER EXTREMITY;  Surgeon: Karl Maier MD;  Location:  MAIN OR;  Service: General     Family History   Adopted: Yes   Problem Relation Age of Onset   • Cancer Mother    • Thyroid cancer Sister    • Cancer Sister    • Cancer Brother      Social History     Socioeconomic History   • Marital status:      Spouse name: None   • Number of children: 4   • Years of education: None   • Highest education level: None   Occupational History   • None   Tobacco Use   • Smoking status: Never Smoker   • Smokeless tobacco: Never Used   • Tobacco comment: Denies    Vaping Use   • Vaping Use: Never used   Substance and Sexual Activity   • Alcohol use: No     Comment: Denies    • Drug use: No     Comment: Denies    • Sexual activity: Not Currently     Comment: Denies    Other Topics Concern   • None   Social History Narrative    [facility]        Most recent tobacco use screenin2019    Do you currently or have you served in the [facility] [employer]: No    Were you activated, into active duty, as a member of the [employer] or as a Reservist: No    Advance directive: No    Chewing tobacco: none Alcohol intake: None    Live alone or with others: alone     Social Determinants of Health     Financial Resource Strain: Not on file   Food Insecurity: Not on file   Transportation Needs: Not on file   Physical Activity: Not on file   Stress: Not on file   Social Connections: Not on file   Intimate Partner Violence: Not on file   Housing Stability: Not on file     Current Outpatient Medications on File Prior to Visit   Medication Sig   • ALPRAZolam (XANAX) 0 25 mg tablet Take 1 tablet (0 25 mg total) by mouth daily Pt states takes this daily, as needed     • atorvastatin (LIPITOR) 20 mg tablet take 1 tablet by mouth once daily   • carvedilol (COREG) 12 5 mg tablet Take 12 5 mg by mouth 2 (two) times a day    • escitalopram (LEXAPRO) 20 mg tablet Take 1 tablet by mouth daily   • fluticasone (FLONASE) 50 mcg/act nasal spray 2 sprays into each nostril daily   • Icosapent Ethyl 1 g CAPS Take 1 capsule by mouth 2 (two) times a day   • levothyroxine (Euthyrox) 75 mcg tablet Take 1 tablet (75 mcg total) by mouth daily in the early morning   • omega-3-acid ethyl esters (LOVAZA) 1 g capsule Take 1 g by mouth 2 (two) times a day   • sacubitril-valsartan (Entresto)  MG TABS Take 1 tablet by mouth 2 (two) times a day   • traZODone (DESYREL) 100 mg tablet Take 100 mg by mouth daily at bedtime as needed   • Calcium Carbonate (Caltrate 600) 1500 (600 Ca) MG TABS Take 1 tablet by mouth 2 (two) times a day   • [DISCONTINUED] sacubitril-valsartan (Entresto) 49-51 MG TABS Take 1 tablet by mouth 2 (two) times a day (Patient not taking: Reported on 10/20/2022)   • [DISCONTINUED] traZODone (DESYREL) 50 mg tablet Take 50 mg by mouth daily at bedtime (Patient not taking: Reported on 10/20/2022)     Allergies   Allergen Reactions   • Doxycycline GI Intolerance     Pt states with upset stomach   • Amoxicillin GI Intolerance     Upset stomach     Immunization History   Administered Date(s) Administered   • COVID-19 PFIZER VACCINE 0 3 ML IM 07/20/2021, 08/11/2021, 02/18/2022   • INFLUENZA 10/18/2019, 10/03/2021   • Influenza, high dose seasonal 0 7 mL 10/30/2018, 01/15/2021, 10/20/2022   • Pneumococcal Conjugate 13-Valent 04/28/2016   • Pneumococcal Conjugate PCV 7 04/04/2017   • Pneumococcal Polysaccharide PPV23 08/19/2017   • Tdap 01/11/2016   • Zoster Vaccine Recombinant 06/13/2021, 10/03/2021   • influenza, trivalent, adjuvanted 10/18/2019       Objective     /80   Pulse 71   Resp 16   Ht 5' (1 524 m)   Wt 74 8 kg (165 lb)   SpO2 97%   BMI 32 22 kg/m²     Physical Exam  Jordon Gomez MD

## 2022-10-26 DIAGNOSIS — E03.9 HYPOTHYROIDISM, UNSPECIFIED TYPE: ICD-10-CM

## 2022-10-26 RX ORDER — LEVOTHYROXINE SODIUM 0.07 MG/1
TABLET ORAL
Qty: 90 TABLET | Refills: 3 | Status: SHIPPED | OUTPATIENT
Start: 2022-10-26

## 2022-11-08 ENCOUNTER — VBI (OUTPATIENT)
Dept: ADMINISTRATIVE | Facility: OTHER | Age: 72
End: 2022-11-08

## 2022-12-27 ENCOUNTER — TELEPHONE (OUTPATIENT)
Dept: FAMILY MEDICINE CLINIC | Facility: CLINIC | Age: 72
End: 2022-12-27

## 2022-12-27 NOTE — TELEPHONE ENCOUNTER
Patient tested positive for Covid 12/21  She said she improved but her cough is still bad, eyes are itchy, she's fatigue, and has a loss of appetite  She wants to know if there is anything she can take to help

## 2023-01-23 ENCOUNTER — VBI (OUTPATIENT)
Dept: ADMINISTRATIVE | Facility: OTHER | Age: 73
End: 2023-01-23

## 2023-04-17 PROBLEM — K62.5 BRBPR (BRIGHT RED BLOOD PER RECTUM): Status: RESOLVED | Noted: 2022-09-14 | Resolved: 2023-04-17

## 2023-06-08 ENCOUNTER — TELEPHONE (OUTPATIENT)
Dept: FAMILY MEDICINE CLINIC | Facility: CLINIC | Age: 73
End: 2023-06-08

## 2023-06-08 NOTE — TELEPHONE ENCOUNTER
----- Message from Bel Neal MD sent at 6/8/2023 11:07 AM EDT -----  Patient needs follow-up appointment, has  Abnormal blood work, elevated tsh

## 2023-06-09 ENCOUNTER — RA CDI HCC (OUTPATIENT)
Dept: OTHER | Facility: HOSPITAL | Age: 73
End: 2023-06-09

## 2023-06-09 RX ORDER — DEUTETRABENAZINE 12 MG/1
TABLET, COATED ORAL 2 TIMES DAILY
COMMUNITY
Start: 2023-05-22

## 2023-06-09 NOTE — PROGRESS NOTES
[facility]  coding opportunities        I50 42 and I11 0  Chart Reviewed number of suggestions sent to Provider: 2     Patients Insurance     Medicare Insurance: [address] [place] [address]

## 2023-06-12 ENCOUNTER — OFFICE VISIT (OUTPATIENT)
Dept: FAMILY MEDICINE CLINIC | Facility: CLINIC | Age: 73
End: 2023-06-12
Payer: COMMERCIAL

## 2023-06-12 VITALS
HEIGHT: 60 IN | RESPIRATION RATE: 16 BRPM | WEIGHT: 168 LBS | SYSTOLIC BLOOD PRESSURE: 120 MMHG | OXYGEN SATURATION: 97 % | DIASTOLIC BLOOD PRESSURE: 70 MMHG | HEART RATE: 66 BPM | BODY MASS INDEX: 32.98 KG/M2

## 2023-06-12 DIAGNOSIS — F33.41 RECURRENT MAJOR DEPRESSIVE DISORDER, IN PARTIAL REMISSION (HCC): ICD-10-CM

## 2023-06-12 DIAGNOSIS — F19.982 DRUG-INDUCED INSOMNIA (HCC): ICD-10-CM

## 2023-06-12 DIAGNOSIS — E03.9 HYPOTHYROIDISM, UNSPECIFIED TYPE: Primary | ICD-10-CM

## 2023-06-12 DIAGNOSIS — G10 HUNTINGTON DISEASE (HCC): ICD-10-CM

## 2023-06-12 PROBLEM — E78.2 MIXED DYSLIPIDEMIA: Status: ACTIVE | Noted: 2019-10-29

## 2023-06-12 PROBLEM — I11.0 HYPERTENSIVE HEART DISEASE WITH CHRONIC COMBINED SYSTOLIC AND DIASTOLIC CONGESTIVE HEART FAILURE (HCC): Status: ACTIVE | Noted: 2019-10-29

## 2023-06-12 PROBLEM — I50.42 HYPERTENSIVE HEART DISEASE WITH CHRONIC COMBINED SYSTOLIC AND DIASTOLIC CONGESTIVE HEART FAILURE (HCC): Status: ACTIVE | Noted: 2019-10-29

## 2023-06-12 PROCEDURE — 99214 OFFICE O/P EST MOD 30 MIN: CPT | Performed by: FAMILY MEDICINE

## 2023-06-12 RX ORDER — TEMAZEPAM 30 MG/1
30 CAPSULE ORAL
Qty: 7 CAPSULE | Refills: 0 | Status: SHIPPED | OUTPATIENT
Start: 2023-06-12 | End: 2023-06-19 | Stop reason: SDUPTHER

## 2023-06-12 RX ORDER — LEVOTHYROXINE SODIUM 0.07 MG/1
75 TABLET ORAL
Qty: 90 TABLET | Refills: 1 | Status: SHIPPED | OUTPATIENT
Start: 2023-06-12

## 2023-06-12 NOTE — PROGRESS NOTES
Name: Alba Bee      : 1950      MRN: [id number]  Encounter Provider: Sharol Dandy, MD  Encounter Date: 2023   Encounter department: [facility]     1  Hypothyroidism, unspecified type  -     levothyroxine (Euthyrox) 75 mcg tablet; Take 1 tablet (75 mcg total) by mouth daily in the early morning  -     TSH, 3rd generation with Free T4 reflex; Future; Expected date: 2023    2  Recurrent major depressive disorder, in partial remission [facility])  Assessment & Plan:  Depression is stable and she is on trazodone and Lexapro psychiatrist      3  Drug-induced insomnia ([facility] [address] )  Assessment & Plan:  Due to medicine Austedo which was given by psychiatrist for Shoaib as she had tremors on her lips and on her hands and she was diagnosed with this condition but she says she has a significant problem with insomnia due to this medication so she cannot take it so she just stopped and now she will talk to the psychiatrist    Orders:  -     temazepam (RESTORIL) 30 mg capsule; Take 1 capsule (30 mg total) by mouth daily at bedtime as needed for sleep    4  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[facility] [address] )    Diagnosed by psychiatrist which she has P[place]a disease, as she had tremors on lips and hands,  Follow-up 7 weeks as thyroid dose is adjusted       Subjective     She is here for follow-up on hypothyroid, she had been taking levothyroxine 50 mcg and recently had TSH is elevated, she feels more tired, she also complained that she cannot sleep at night because psychiatrist gave her some medicine which was for Shoaib but she felt the side effect was that she could not sleep at nighttime so she stopped taking today that medicine and will talk to the psychiatrist but she needs in the meantime something to help her sleep  She has ICD, no recent chest pain or cardiac problem following cardiologist    Review of Systems   Constitutional: Negative  HENT: Negative  Eyes: Negative  Respiratory: Negative  Cardiovascular: Negative  Gastrointestinal: Negative  Musculoskeletal: Negative  Psychiatric/Behavioral: Positive for sleep disturbance   web site checked   Past Medical History:   Diagnosis Date   • Anxiety    • Cardiac pacemaker     Has had pacemaker since , new pacemaker implanted this past fall 2020  • Herpes zoster     onset: 12/6/10   • History of urinary tract infection     Pt states with UTI back in December- treated with ATB- no further symptoms at this time     • Hyperlipidemia    • Hypertension    • Hypothyroidism     onset: 12   • PONV (postoperative nausea and vomiting)    • Vertigo     onset: 12     Past Surgical History:   Procedure Laterality Date   • CARDIAC PACEMAKER PLACEMENT  2012    ICD-Biotronik-Lumax   •  SECTION     • [place] EXCISION TUMOR SOFT TISSUE SHOULDER SUBQ 3 CM/> Right 2021    Procedure: EXCISION BIOPSY TISSUE LESION/MASS UPPER EXTREMITY;  Surgeon: Ryan Johansen MD;  Location:  MAIN OR;  Service: General     Family History   Adopted: Yes   Problem Relation Age of Onset   • Cancer Mother    • Thyroid cancer Sister    • Cancer Sister    • Cancer Brother      Social History     Socioeconomic History   • Marital status:      Spouse name: None   • Number of children: 4   • Years of education: None   • Highest education level: None   Occupational History   • None   Tobacco Use   • Smoking status: Never   • Smokeless tobacco: Never   • Tobacco comments:     Denies    Vaping Use   • Vaping Use: Never used   Substance and Sexual Activity   • Alcohol use: No     Comment: Denies    • Drug use: No     Comment: Denies    • Sexual activity: Not Currently     Comment: Denies    Other Topics Concern   • None   Social History Narrative    Single as per [employer]        Most recent tobacco use screenin2019    Do you currently or have you served in the [facility]: No    Were you activated, into active duty, as a member of the [employer] or as a Reservist: No    Advance directive: No    Chewing tobacco: none    Alcohol intake: None    Live alone or with others: alone     Social Determinants of Health     Financial Resource Strain: Unknown (4/17/2023)    Overall Financial Resource Strain (CARDIA)    • Difficulty of Paying Living Expenses: Patient refused   Food Insecurity: Not on file   Transportation Needs: Unknown (4/17/2023)    [address] - Transportation    • Lack of Transportation (Medical): Patient refused    • Lack of Transportation (Non-Medical): Patient refused   Physical Activity: Not on file   Stress: Not on file   Social Connections: Not on file   Intimate Partner Violence: Not on file   Housing Stability: Not on file     Current Outpatient Medications on File Prior to Visit   Medication Sig   • ALPRAZolam (XANAX) 0 25 mg tablet Take 1 tablet (0 25 mg total) by mouth daily Pt states takes this daily, as needed     • atorvastatin (LIPITOR) 20 mg tablet Take 1 tablet (20 mg total) by mouth daily   • Calcium Carbonate (Caltrate 600) 1500 (600 Ca) MG TABS Take 1 tablet by mouth 2 (two) times a day   • carvedilol (COREG) 12 5 mg tablet Take 12 5 mg by mouth 2 (two) times a day    • escitalopram (LEXAPRO) 20 mg tablet Take 1 tablet by mouth daily   • fluticasone (FLONASE) 50 mcg/act nasal spray 2 sprays into each nostril daily   • Icosapent Ethyl 1 g CAPS Take 1 capsule by mouth 2 (two) times a day   • omega-3-acid ethyl esters (LOVAZA) 1 g capsule Take 1 g by mouth 2 (two) times a day   • sacubitril-valsartan (Entresto)  MG TABS Take 1 tablet by mouth 2 (two) times a day   • traZODone (DESYREL) 100 mg tablet Take 100 mg by mouth daily at bedtime as needed   • [DISCONTINUED] levothyroxine (Euthyrox) 50 mcg tablet Take 1 tablet (50 mcg total) by mouth daily in the early morning   • Deutetrabenazine (Austedo) 12 MG TABS 2 (two) times a day (Patient not taking: Reported on 6/12/2023)     Allergies Allergen Reactions   • Doxycycline GI Intolerance     Pt states with upset stomach   • Amoxicillin GI Intolerance     Upset stomach     Immunization History   Administered Date(s) Administered   • COVID-19 PFIZER VACCINE 0 3 ML IM 07/20/2021, 08/11/2021, 02/18/2022   • INFLUENZA 10/18/2019, 10/03/2021   • Influenza, high dose seasonal 0 7 mL 10/30/2018, 01/15/2021, 10/20/2022   • Pneumococcal Conjugate 13-Valent 04/28/2016   • Pneumococcal Conjugate PCV 7 04/04/2017   • Pneumococcal Polysaccharide PPV23 08/19/2017   • Tdap 01/11/2016   • Zoster Vaccine Recombinant 06/13/2021, 10/03/2021   • influenza, trivalent, adjuvanted 10/18/2019       Objective     /70   Pulse 66   Resp 16   Ht 5' (1 524 m)   Wt 76 2 kg (168 lb)   SpO2 97%   BMI 32 81 kg/m²     Physical Exam  Vitals and nursing note reviewed  Constitutional:       Appearance: Normal appearance  She is well-developed  Eyes:      Extraocular Movements: Extraocular movements intact  Neck:      Thyroid: No thyromegaly  Cardiovascular:      Rate and Rhythm: Normal rate and regular rhythm  Heart sounds: Normal heart sounds  No murmur heard  Pulmonary:      Effort: Pulmonary effort is normal       Breath sounds: Normal breath sounds  No wheezing or rales  Musculoskeletal:      Cervical back: Normal range of motion and neck supple  Right lower leg: No edema  Left lower leg: No edema  Skin:     Findings: No erythema or rash  Neurological:      General: No focal deficit present  Mental Status: She is alert  Psychiatric:         Mood and Affect: Mood normal      Depression Screening Follow-up Plan: Patient's depression screening was positive with a PHQ-2 score of 0  Their PHQ-9 score was   Continue regular follow-up with their psychologist/therapist/psychiatrist who is managing their mental health condition(s)    Velia Steve MD

## 2023-06-12 NOTE — ASSESSMENT & PLAN NOTE
Due to medicine Austedo which was given by psychiatrist for [place] as she had tremors on her lips and on her hands and she was diagnosed with this condition but she says she has a significant problem with insomnia due to this medication so she cannot take it so she just stopped and now she will talk to the psychiatrist

## 2023-06-16 PROBLEM — Z00.00 MEDICARE ANNUAL WELLNESS VISIT, SUBSEQUENT: Status: RESOLVED | Noted: 2019-10-30 | Resolved: 2023-06-16

## 2023-06-19 DIAGNOSIS — F19.982 DRUG-INDUCED INSOMNIA (HCC): ICD-10-CM

## 2023-06-19 RX ORDER — TEMAZEPAM 30 MG/1
30 CAPSULE ORAL
Qty: 30 CAPSULE | Refills: 0 | Status: SHIPPED | OUTPATIENT
Start: 2023-06-19

## 2023-07-18 DIAGNOSIS — F19.982 DRUG-INDUCED INSOMNIA (HCC): ICD-10-CM

## 2023-07-18 RX ORDER — TEMAZEPAM 30 MG/1
30 CAPSULE ORAL
Qty: 30 CAPSULE | Refills: 0 | Status: CANCELLED | OUTPATIENT
Start: 2023-07-18

## 2023-07-20 ENCOUNTER — TELEPHONE (OUTPATIENT)
Dept: FAMILY MEDICINE CLINIC | Facility: CLINIC | Age: 73
End: 2023-07-20

## 2023-07-20 DIAGNOSIS — F19.982 DRUG-INDUCED INSOMNIA (HCC): ICD-10-CM

## 2023-07-20 RX ORDER — TEMAZEPAM 30 MG/1
30 CAPSULE ORAL
Qty: 30 CAPSULE | Refills: 0 | Status: SHIPPED | OUTPATIENT
Start: 2023-07-20

## 2023-07-20 RX ORDER — TEMAZEPAM 30 MG/1
30 CAPSULE ORAL
Qty: 30 CAPSULE | Refills: 0 | OUTPATIENT
Start: 2023-07-20

## 2023-08-03 ENCOUNTER — OFFICE VISIT (OUTPATIENT)
Dept: FAMILY MEDICINE CLINIC | Facility: CLINIC | Age: 73
End: 2023-08-03
Payer: COMMERCIAL

## 2023-08-03 VITALS
HEART RATE: 70 BPM | DIASTOLIC BLOOD PRESSURE: 60 MMHG | SYSTOLIC BLOOD PRESSURE: 98 MMHG | HEIGHT: 60 IN | WEIGHT: 171 LBS | BODY MASS INDEX: 33.57 KG/M2 | OXYGEN SATURATION: 97 % | RESPIRATION RATE: 16 BRPM

## 2023-08-03 DIAGNOSIS — Z11.59 NEED FOR HEPATITIS C SCREENING TEST: ICD-10-CM

## 2023-08-03 DIAGNOSIS — F33.41 RECURRENT MAJOR DEPRESSIVE DISORDER, IN PARTIAL REMISSION (HCC): ICD-10-CM

## 2023-08-03 DIAGNOSIS — E03.9 HYPOTHYROIDISM, UNSPECIFIED TYPE: Primary | ICD-10-CM

## 2023-08-03 DIAGNOSIS — E78.00 HYPERCHOLESTEROLEMIA: ICD-10-CM

## 2023-08-03 PROCEDURE — 99214 OFFICE O/P EST MOD 30 MIN: CPT | Performed by: FAMILY MEDICINE

## 2023-08-03 RX ORDER — LEVOTHYROXINE SODIUM 0.07 MG/1
75 TABLET ORAL
Qty: 90 TABLET | Refills: 3 | Status: SHIPPED | OUTPATIENT
Start: 2023-08-03

## 2023-08-03 RX ORDER — ALPRAZOLAM 0.25 MG/1
0.25 TABLET ORAL DAILY PRN
COMMUNITY
Start: 2023-07-24

## 2023-08-03 NOTE — PROGRESS NOTES
Name: Mary Miller      : 1950      MRN: [id number]  Encounter Provider: Aditya Schmidt MD  Encounter Date: 8/3/2023   Encounter department: [address] [place] [address]     1. Hypothyroidism, unspecified type  Assessment & Plan:  TSH is normal, she will continue levothyroxine the same dose, her dose was adjusted on last time, will repeat labs in 6 months    Orders:  -     levothyroxine (Euthyrox) 75 mcg tablet; Take 1 tablet (75 mcg total) by mouth daily in the early morning  -     CBC and differential; Future; Expected date: 2024  -     Comprehensive metabolic panel; Future; Expected date: 2024  -     Lipid panel; Future; Expected date: 2024  -     TSH, 3rd generation; Future; Expected date: 2024    2. Need for hepatitis C screening test    3. Hypercholesterolemia  Assessment & Plan:  She is on Lipitor 20 mg    Orders:  -     CBC and differential; Future; Expected date: 2024  -     Comprehensive metabolic panel; Future; Expected date: 2024  -     Lipid panel; Future; Expected date: 2024    4.  Recurrent major depressive disorder, in partial remission ([address])  Assessment & Plan:  Stable on Lexapro and trazodone, discussed with her to talk to the psychiatrist as she is on Xanax which she should not be taking as she is taking temazepam for sleep, because these are the same class of medication, and if trazodone is not too effective she can talk to her psychiatrist to reduce the dose or stop that medication and continue Lexapro             Subjective     She follows cardiologist and psychiatrist, she is on trazodone and Lexapro from psychiatrist, she states trazodone does not help much and she does not sleep well so temazepam is working, she says she does not take Xanax on regular basis, if she needs it she take only in the morning,  She follows cardiologist and heart has been stable no recent leg edema she likes to eat carbs    Review of Systems Constitutional: Negative for activity change, appetite change, chills, fatigue, fever and unexpected weight change. HENT: Negative for congestion, ear discharge, ear pain, nosebleeds, postnasal drip, rhinorrhea, sinus pressure, sneezing, sore throat, trouble swallowing and voice change. Eyes: Negative for photophobia, pain, discharge, redness and itching. Respiratory: Negative for cough, chest tightness, shortness of breath and wheezing. Cardiovascular: Negative for chest pain, palpitations and leg swelling. Gastrointestinal: Negative for abdominal pain, constipation, diarrhea, nausea and vomiting. Endocrine: Negative for polyuria. Genitourinary: Negative for dysuria, frequency and urgency. Musculoskeletal: Negative for arthralgias, back pain, myalgias and neck pain. Skin: Negative for color change, pallor and rash. Allergic/Immunologic: Negative for environmental allergies and food allergies. Neurological: Negative for dizziness, weakness, light-headedness and headaches. Hematological: Negative for adenopathy. Does not bruise/bleed easily. Psychiatric/Behavioral: Negative for behavioral problems, decreased concentration and sleep disturbance. The patient is not nervous/anxious. Past Medical History:   Diagnosis Date   • Anxiety    • Cardiac pacemaker     Has had pacemaker since , new pacemaker implanted this past fall 2020. • Herpes zoster     onset: 12/6/10   • History of urinary tract infection     Pt states with UTI back in December- treated with ATB- no further symptoms at this time.    • Hyperlipidemia    • Hypertension    • Hypothyroidism     onset: 12   • PONV (postoperative nausea and vomiting)    • Vertigo     onset: 12     Past Surgical History:   Procedure Laterality Date   • CARDIAC PACEMAKER PLACEMENT  2012    ICD-Biotronik-Lumax   •  SECTION     • [place] EXCISION TUMOR SOFT TISSUE SHOULDER SUBQ 3 CM/> Right 2021    Procedure: EXCISION BIOPSY TISSUE LESION/MASS UPPER EXTREMITY;  Surgeon: Lavonne Nazario MD;  Location:  MAIN OR;  Service: General     Family History   Adopted: Yes   Problem Relation Age of Onset   • Cancer Mother    • Thyroid cancer Sister    • Cancer Sister    • Cancer Brother      Social History     Socioeconomic History   • Marital status:      Spouse name: None   • Number of children: 4   • Years of education: None   • Highest education level: None   Occupational History   • None   Tobacco Use   • Smoking status: Never   • Smokeless tobacco: Never   • Tobacco comments:     Denies    Vaping Use   • Vaping Use: Never used   Substance and Sexual Activity   • Alcohol use: No     Comment: Denies    • Drug use: No     Comment: Denies    • Sexual activity: Not Currently     Comment: Denies    Other Topics Concern   • None   Social History Narrative    Single as per [employer]        Most recent tobacco use screenin2019    Do you currently or have you served in the [address] [place] [address] [employer]: No    Were you activated, into active duty, as a member of the [employer] or as a Reservist: No    Advance directive: No    Chewing tobacco: none    Alcohol intake: None    Live alone or with others: alone     Social Determinants of Health     Financial Resource Strain: Unknown (2023)    Overall Financial Resource Strain (CARDIA)    • Difficulty of Paying Living Expenses: Patient refused   Food Insecurity: Not on file   Transportation Needs: Unknown (2023)    [facility] Transportation    • Lack of Transportation (Medical): Patient refused    • Lack of Transportation (Non-Medical): Patient refused   Physical Activity: Not on file   Stress: Not on file   Social Connections: Not on file   Intimate Partner Violence: Not on file   Housing Stability: Not on file     Current Outpatient Medications on File Prior to Visit   Medication Sig   • ALPRAZolam (XANAX) 0.25 mg tablet Take 0.25 mg by mouth daily as needed   • atorvastatin (LIPITOR) 20 mg tablet Take 1 tablet (20 mg total) by mouth daily   • carvedilol (COREG) 12.5 mg tablet Take 12.5 mg by mouth 2 (two) times a day    • Deutetrabenazine (Austedo) 12 MG TABS 2 (two) times a day   • escitalopram (LEXAPRO) 20 mg tablet Take 1 tablet by mouth daily   • fluticasone (FLONASE) 50 mcg/act nasal spray 2 sprays into each nostril daily   • Icosapent Ethyl 1 g CAPS Take 1 capsule by mouth 2 (two) times a day   • omega-3-acid ethyl esters (LOVAZA) 1 g capsule Take 1 g by mouth 2 (two) times a day   • sacubitril-valsartan (Entresto)  MG TABS Take 1 tablet by mouth 2 (two) times a day   • temazepam (RESTORIL) 30 mg capsule Take 1 capsule (30 mg total) by mouth daily at bedtime as needed for sleep   • traZODone (DESYREL) 100 mg tablet Take 100 mg by mouth daily at bedtime as needed   • [DISCONTINUED] levothyroxine (Euthyrox) 75 mcg tablet Take 1 tablet (75 mcg total) by mouth daily in the early morning   • Calcium Carbonate (Caltrate 600) 1500 (600 Ca) MG TABS Take 1 tablet by mouth 2 (two) times a day     Allergies   Allergen Reactions   • Doxycycline GI Intolerance     Pt states with upset stomach   • Amoxicillin GI Intolerance     Upset stomach     Immunization History   Administered Date(s) Administered   • COVID-19 PFIZER VACCINE 0.3 ML IM 07/20/2021, 08/11/2021, 02/18/2022   • INFLUENZA 10/18/2019, 10/03/2021   • Influenza, high dose seasonal 0.7 mL 10/30/2018, 01/15/2021, 10/20/2022   • Pneumococcal Conjugate 13-Valent 04/28/2016   • Pneumococcal Conjugate PCV 7 04/04/2017   • Pneumococcal Polysaccharide PPV23 08/19/2017   • Tdap 01/11/2016   • Zoster Vaccine Recombinant 06/13/2021, 10/03/2021   • influenza, trivalent, adjuvanted 10/30/2018, 10/18/2019, 01/15/2021, 10/20/2022       Objective     BP 98/60   Pulse 70   Resp 16   Ht 5' (1.524 m)   Wt 77.6 kg (171 lb)   SpO2 97%   BMI 33.40 kg/m²     Physical Exam  Vitals and nursing note reviewed.    Constitutional: Appearance: Normal appearance. She is well-developed. She is not ill-appearing. Eyes:      Extraocular Movements: Extraocular movements intact. Neck:      Thyroid: No thyromegaly. Cardiovascular:      Rate and Rhythm: Normal rate and regular rhythm. Heart sounds: Normal heart sounds. No murmur heard. Pulmonary:      Effort: Pulmonary effort is normal.      Breath sounds: Normal breath sounds. No wheezing or rales. Abdominal:      General: Bowel sounds are normal.      Palpations: Abdomen is soft. Tenderness: There is no abdominal tenderness. Musculoskeletal:      Cervical back: Normal range of motion and neck supple. Right lower leg: No edema. Left lower leg: No edema. Skin:     Findings: No erythema or rash. Neurological:      General: No focal deficit present. Mental Status: She is alert. Depression Screening Follow-up Plan: Patient's depression screening was positive with a PHQ-2 score of . Their PHQ-9 score was 0. Continue regular follow-up with their psychologist/therapist/psychiatrist who is managing their mental health condition(s).   Elizabeth Wagner MD

## 2023-08-03 NOTE — ASSESSMENT & PLAN NOTE
Stable on Lexapro and trazodone, discussed with her to talk to the psychiatrist as she is on Xanax which she should not be taking as she is taking temazepam for sleep, because these are the same class of medication, and if trazodone is not too effective she can talk to her psychiatrist to reduce the dose or stop that medication and continue Lexapro

## 2023-08-03 NOTE — ASSESSMENT & PLAN NOTE
TSH is normal, she will continue levothyroxine the same dose, her dose was adjusted on last time, will repeat labs in 6 months

## 2023-10-30 ENCOUNTER — OFFICE VISIT (OUTPATIENT)
Dept: FAMILY MEDICINE CLINIC | Facility: CLINIC | Age: 73
End: 2023-10-30
Payer: COMMERCIAL

## 2023-10-30 VITALS
WEIGHT: 176 LBS | OXYGEN SATURATION: 99 % | DIASTOLIC BLOOD PRESSURE: 70 MMHG | SYSTOLIC BLOOD PRESSURE: 105 MMHG | HEIGHT: 60 IN | HEART RATE: 68 BPM | BODY MASS INDEX: 34.55 KG/M2

## 2023-10-30 DIAGNOSIS — E03.9 HYPOTHYROIDISM, UNSPECIFIED TYPE: Primary | ICD-10-CM

## 2023-10-30 DIAGNOSIS — I50.22 CHRONIC SYSTOLIC CHF (CONGESTIVE HEART FAILURE) (HCC): ICD-10-CM

## 2023-10-30 DIAGNOSIS — F33.41 RECURRENT MAJOR DEPRESSIVE DISORDER, IN PARTIAL REMISSION (HCC): ICD-10-CM

## 2023-10-30 DIAGNOSIS — I10 BENIGN ESSENTIAL HYPERTENSION: ICD-10-CM

## 2023-10-30 PROCEDURE — 99214 OFFICE O/P EST MOD 30 MIN: CPT | Performed by: FAMILY MEDICINE

## 2023-10-30 RX ORDER — DOXEPIN HYDROCHLORIDE 10 MG/1
CAPSULE ORAL
COMMUNITY
Start: 2023-10-12

## 2023-10-30 RX ORDER — LEVOTHYROXINE SODIUM 88 UG/1
88 TABLET ORAL DAILY
Qty: 90 TABLET | Refills: 0 | Status: SHIPPED | OUTPATIENT
Start: 2023-10-30

## 2023-10-30 NOTE — ASSESSMENT & PLAN NOTE
As TSH is high, will increase the levothyroxine dose to 88 mcg and recheck TSH in 6 weeks and then recheck in 6 months

## 2023-10-30 NOTE — PROGRESS NOTES
Name: Winnie Mayorga      : 1950      MRN: [id number]  Encounter Provider: Cindy Myers MD  Encounter Date: 10/30/2023   Encounter department: [address] [place] [address]     1. Hypothyroidism, unspecified type  Assessment & Plan:  As TSH is high, will increase the levothyroxine dose to 88 mcg and recheck TSH in 6 weeks and then recheck in 6 months    Orders:  -     levothyroxine (Euthyrox) 88 mcg tablet; Take 1 tablet (88 mcg total) by mouth daily  -     TSH, 3rd generation with Free T4 reflex; Future; Expected date: 2023  -     CBC and differential; Future; Expected date: 2024  -     Comprehensive metabolic panel; Future; Expected date: 2024  -     Lipid panel; Future; Expected date: 2024  -     TSH, 3rd generation; Future; Expected date: 2024    2. Benign essential hypertension  -     CBC and differential; Future; Expected date: 2024  -     Comprehensive metabolic panel; Future; Expected date: 2024  -     Lipid panel; Future; Expected date: 2024    3. Chronic systolic CHF (congestive heart failure) ([address])    4. Recurrent major depressive disorder, in partial remission ([address])  Assessment & Plan:  : Continue same medication and follow psychiatrist             Subjective     Follow-up on labs, she has this cardiomyopathy and history of congestive heart failure, she follows her cardiologist overall stable no leg edema no excessive weight gain, denies any headache chest pain or shortness of breath, taking all her medications.   She is on levothyroxine 75 mcg and she says she does not feel fatigue,  She is on statins for high cholesterol she tries to eat healthy and remains active  She has insomnia and she takes temazepam, she says psychiatrist gave her doxepin Lexapro and alprazolam occasional for anxiety, she says she stopped the trazodone    Depression Screening Follow-up Plan: Patient's depression screening was positive with a PHQ-2 score of . Their PHQ-9 score was . Continue regular follow-up with their psychologist/therapist/psychiatrist who is managing their mental health condition(s). Review of Systems   Constitutional:  Negative for activity change, appetite change, chills, fatigue, fever and unexpected weight change. HENT:  Negative for congestion, ear discharge, ear pain, nosebleeds, postnasal drip, rhinorrhea, sinus pressure, sneezing, sore throat, trouble swallowing and voice change. Eyes:  Negative for photophobia, pain, discharge, redness and itching. Respiratory:  Negative for cough, chest tightness, shortness of breath and wheezing. Cardiovascular:  Negative for chest pain, palpitations and leg swelling. Gastrointestinal:  Negative for abdominal pain, constipation, diarrhea, nausea and vomiting. Endocrine: Negative for polyuria. Genitourinary:  Negative for dysuria, frequency and urgency. Musculoskeletal:  Negative for arthralgias, back pain, myalgias and neck pain. Skin:  Negative for color change, pallor and rash. Allergic/Immunologic: Negative for environmental allergies and food allergies. Neurological:  Negative for dizziness, weakness, light-headedness and headaches. Hematological:  Negative for adenopathy. Does not bruise/bleed easily. Psychiatric/Behavioral:  Negative for behavioral problems. The patient is not nervous/anxious. Past Medical History:   Diagnosis Date   • Anxiety    • Cardiac pacemaker     Has had pacemaker since 2007, new pacemaker implanted this past fall 9/2020. • Herpes zoster     onset: 12/6/10   • History of urinary tract infection     Pt states with UTI back in December- treated with ATB- no further symptoms at this time.    • Hyperlipidemia    • Hypertension    • Hypothyroidism     onset: 1/31/12   • PONV (postoperative nausea and vomiting)    • Vertigo     onset: 1/31/12     Past Surgical History:   Procedure Laterality Date   • CARDIAC PACEMAKER PLACEMENT 2012    ICD-[facility]   •  SECTION     • AL EXCISION TUMOR SOFT TISSUE SHOULDER SUBQ 3 CM/> Right 2021    Procedure: EXCISION BIOPSY TISSUE LESION/MASS UPPER EXTREMITY;  Surgeon: Torsten Fung MD;  Location:  MAIN OR;  Service: General     Family History   Adopted: Yes   Problem Relation Age of Onset   • Cancer Mother    • Thyroid cancer Sister    • Cancer Sister    • Cancer Brother      Social History     Socioeconomic History   • Marital status:      Spouse name: Not on file   • Number of children: 4   • Years of education: Not on file   • Highest education level: Not on file   Occupational History   • Not on file   Tobacco Use   • Smoking status: Never   • Smokeless tobacco: Never   • Tobacco comments:     Denies    Vaping Use   • Vaping Use: Never used   Substance and Sexual Activity   • Alcohol use: No     Comment: Denies    • Drug use: No     Comment: Denies    • Sexual activity: Not Currently     Comment: Denies    Other Topics Concern   • Not on file   Social History Narrative    Single as per [facility] [place]        Most recent tobacco use screenin2019    Do you currently or have you served in the [address] [place] [address] [place]: No    Were you activated, into active duty, as a member of the [employer] or as a Reservist: No    Advance directive: No    Chewing tobacco: none    Alcohol intake: None    Live alone or with others: alone     Social Determinants of Health     Financial Resource Strain: Unknown (2023)    Overall Financial Resource Strain (CARDIA)    • Difficulty of Paying Living Expenses: Patient refused   Food Insecurity: Not on file   Transportation Needs: Unknown (2023)    [facility] Transportation    • Lack of Transportation (Medical): Patient refused    • Lack of Transportation (Non-Medical): Patient refused   Physical Activity: Not on file   Stress: Not on file   Social Connections: Not on file   Intimate Partner Violence: Not on file   Housing Stability: Not on file     Current Outpatient Medications on File Prior to Visit   Medication Sig   • ALPRAZolam (XANAX) 0.25 mg tablet Take 0.25 mg by mouth daily as needed   • atorvastatin (LIPITOR) 20 mg tablet Take 1 tablet (20 mg total) by mouth daily   • carvedilol (COREG) 12.5 mg tablet Take 12.5 mg by mouth 2 (two) times a day    • Deutetrabenazine (Austedo) 12 MG TABS 2 (two) times a day   • doxepin (SINEquan) 10 mg capsule TAKE 1 CAPSULE BY MOUTH AT BEDTIME.  TAKE WITH TEMAZEPAM   • escitalopram (LEXAPRO) 20 mg tablet Take 1 tablet by mouth daily   • fluticasone (FLONASE) 50 mcg/act nasal spray 2 sprays into each nostril daily   • Icosapent Ethyl 1 g CAPS Take 1 capsule by mouth 2 (two) times a day   • omega-3-acid ethyl esters (LOVAZA) 1 g capsule Take 1 g by mouth 2 (two) times a day   • sacubitril-valsartan (Entresto)  MG TABS Take 1 tablet by mouth 2 (two) times a day   • temazepam (RESTORIL) 30 mg capsule Take 1 capsule (30 mg total) by mouth daily at bedtime as needed for sleep   • [DISCONTINUED] levothyroxine (Euthyrox) 75 mcg tablet Take 1 tablet (75 mcg total) by mouth daily in the early morning   • Calcium Carbonate (Caltrate 600) 1500 (600 Ca) MG TABS Take 1 tablet by mouth 2 (two) times a day   • [DISCONTINUED] traZODone (DESYREL) 100 mg tablet Take 100 mg by mouth daily at bedtime as needed (Patient not taking: Reported on 10/30/2023)     Allergies   Allergen Reactions   • Doxycycline GI Intolerance     Pt states with upset stomach   • Amoxicillin GI Intolerance     Upset stomach     Immunization History   Administered Date(s) Administered   • COVID-19 PFIZER VACCINE 0.3 ML IM 07/20/2021, 08/11/2021, 02/18/2022   • INFLUENZA 10/18/2019, 10/03/2021   • Influenza, high dose seasonal 0.7 mL 10/30/2018, 01/15/2021, 10/20/2022   • Pneumococcal Conjugate 13-Valent 04/28/2016   • Pneumococcal Conjugate PCV 7 04/04/2017   • Pneumococcal Polysaccharide PPV23 08/19/2017   • Tdap 01/11/2016   • Zoster Vaccine Recombinant 06/13/2021, 10/03/2021   • influenza, trivalent, adjuvanted 10/30/2018, 10/18/2019, 01/15/2021, 10/20/2022       Objective     /70   Pulse 68   Ht 5' (1.524 m)   Wt 79.8 kg (176 lb)   SpO2 99%   BMI 34.37 kg/m²     Physical Exam  Vitals and nursing note reviewed. Constitutional:       Appearance: Normal appearance. She is well-developed. She is not ill-appearing. Eyes:      Extraocular Movements: Extraocular movements intact. Neck:      Thyroid: No thyromegaly. Cardiovascular:      Rate and Rhythm: Normal rate and regular rhythm. Heart sounds: Normal heart sounds. No murmur heard. Pulmonary:      Effort: Pulmonary effort is normal.      Breath sounds: Normal breath sounds. No wheezing or rales. Abdominal:      General: There is no distension. Tenderness: There is no abdominal tenderness. Musculoskeletal:      Cervical back: Normal range of motion and neck supple. Right lower leg: No edema. Left lower leg: No edema. Skin:     Coloration: Skin is not jaundiced. Findings: No erythema or rash. Neurological:      General: No focal deficit present. Mental Status: She is alert.    Psychiatric:         Mood and Affect: Mood normal.       Madelin Churchill MD

## 2023-12-26 ENCOUNTER — TELEPHONE (OUTPATIENT)
Dept: FAMILY MEDICINE CLINIC | Facility: CLINIC | Age: 73
End: 2023-12-26

## 2023-12-26 DIAGNOSIS — E03.9 HYPOTHYROIDISM, UNSPECIFIED TYPE: Primary | ICD-10-CM

## 2023-12-26 RX ORDER — LEVOTHYROXINE SODIUM 0.07 MG/1
75 TABLET ORAL
Qty: 90 TABLET | Refills: 1 | Status: SHIPPED | OUTPATIENT
Start: 2023-12-26

## 2023-12-26 NOTE — TELEPHONE ENCOUNTER
----- Message from Raiza Alvarado MD sent at 12/26/2023  8:37 AM EST -----  Please inform the patient, her labs are reviewed and thyroid dose is readjusted and sent the new prescription for levothyroxine 75 mcg and recheck TSH after 6 weeks and I placed the order.

## 2024-02-21 PROBLEM — Z11.59 NEED FOR HEPATITIS C SCREENING TEST: Status: RESOLVED | Noted: 2019-10-08 | Resolved: 2024-02-21

## 2024-03-18 DIAGNOSIS — E78.2 MIXED HYPERLIPIDEMIA: ICD-10-CM

## 2024-03-18 RX ORDER — ATORVASTATIN CALCIUM 20 MG/1
20 TABLET, FILM COATED ORAL DAILY
Qty: 90 TABLET | Refills: 1 | Status: SHIPPED | OUTPATIENT
Start: 2024-03-18

## 2024-03-18 NOTE — TELEPHONE ENCOUNTER
Reason for call:   [x] Refill   [] Prior Auth  [] Other:     Office:   [x] PCP/Provider -   [] Specialty/Provider -     Medication: Lipitor    Dose/Frequency: 20 mg     Quantity: #90    Pharmacy: [facility] [address]    Does the patient have enough for 3 days?   [] Yes   [x] No - Send as HP to POD

## 2024-04-24 LAB
ALBUMIN SERPL-MCNC: 4.1 G/DL (ref 3.5–5.7)
ALP SERPL-CCNC: 67 U/L (ref 35–120)
ALT SERPL-CCNC: 11 U/L
ANION GAP SERPL CALCULATED.3IONS-SCNC: 9 MMOL/L (ref 3–11)
AST SERPL-CCNC: 15 U/L
BASOPHILS # BLD AUTO: 0 THOU/CMM (ref 0–0.1)
BASOPHILS NFR BLD AUTO: 1 %
BILIRUB SERPL-MCNC: 0.9 MG/DL (ref 0.2–1)
BUN SERPL-MCNC: 12 MG/DL (ref 7–25)
CALCIUM SERPL-MCNC: 9.4 MG/DL (ref 8.5–10.1)
CHLORIDE SERPL-SCNC: 107 MMOL/L (ref 100–109)
CO2 SERPL-SCNC: 28 MMOL/L (ref 21–31)
CREAT SERPL-MCNC: 0.85 MG/DL (ref 0.4–1.1)
CYTOLOGY CMNT CVX/VAG CYTO-IMP: ABNORMAL
DIFFERENTIAL METHOD BLD: ABNORMAL
EOSINOPHIL # BLD AUTO: 0.1 THOU/CMM (ref 0–0.5)
EOSINOPHIL NFR BLD AUTO: 2 %
ERYTHROCYTE [DISTWIDTH] IN BLOOD BY AUTOMATED COUNT: 14.2 % (ref 12–16)
GFR/BSA.PRED SERPLBLD CYS-BASED-ARV: 72 ML/MIN/{1.73_M2}
GLUCOSE SERPL-MCNC: 105 MG/DL (ref 65–99)
HCT VFR BLD AUTO: 42.9 % (ref 35–43)
HGB BLD-MCNC: 14.2 G/DL (ref 11.5–14.5)
LYMPHOCYTES # BLD AUTO: 2.1 THOU/CMM (ref 1–3)
LYMPHOCYTES NFR BLD AUTO: 32 %
MCH RBC QN AUTO: 28.7 PG (ref 26–34)
MCHC RBC AUTO-ENTMCNC: 33.2 G/DL (ref 32–37)
MCV RBC AUTO: 87 FL (ref 80–100)
MONOCYTES # BLD AUTO: 0.4 THOU/CMM (ref 0.3–1)
MONOCYTES NFR BLD AUTO: 6 %
NEUTROPHILS # BLD AUTO: 4 THOU/CMM (ref 1.8–7.8)
NEUTROPHILS NFR BLD AUTO: 59 %
PLATELET # BLD AUTO: 218 THOU/CMM (ref 140–350)
PMV BLD REES-ECKER: 8.6 FL (ref 7.5–11.3)
POTASSIUM SERPL-SCNC: 4.2 MMOL/L (ref 3.5–5.2)
PROT SERPL-MCNC: 6.7 G/DL (ref 6.3–8.3)
RBC # BLD AUTO: 4.96 MILL/CMM (ref 3.7–4.7)
SODIUM SERPL-SCNC: 144 MMOL/L (ref 135–145)
TSH SERPL-ACNC: 0.68 UIU/ML (ref 0.45–5.33)
WBC # BLD AUTO: 6.6 THOU/CMM (ref 4–10)

## 2024-04-25 ENCOUNTER — TELEPHONE (OUTPATIENT)
Dept: FAMILY MEDICINE CLINIC | Facility: CLINIC | Age: 74
End: 2024-04-25

## 2024-04-25 LAB
CHOLEST SERPL-MCNC: 133 MG/DL
CHOLEST/HDLC SERPL: 3 {RATIO}
HDLC SERPL-MCNC: 45 MG/DL (ref 23–92)
LDLC SERPL CALC-MCNC: 70 MG/DL
NONHDLC SERPL-MCNC: 88 MG/DL
TRIGL SERPL-MCNC: 89 MG/DL

## 2024-04-25 NOTE — TELEPHONE ENCOUNTER
----- Message from Raiza Alvarado MD sent at 4/25/2024  1:00 PM EDT -----  Patient needs follow-up appointment, 6-month follow-up is due

## 2024-04-29 ENCOUNTER — OFFICE VISIT (OUTPATIENT)
Dept: FAMILY MEDICINE CLINIC | Facility: CLINIC | Age: 74
End: 2024-04-29
Payer: COMMERCIAL

## 2024-04-29 VITALS
HEIGHT: 60 IN | SYSTOLIC BLOOD PRESSURE: 130 MMHG | WEIGHT: 186 LBS | BODY MASS INDEX: 36.52 KG/M2 | OXYGEN SATURATION: 96 % | HEART RATE: 72 BPM | DIASTOLIC BLOOD PRESSURE: 70 MMHG | RESPIRATION RATE: 16 BRPM

## 2024-04-29 DIAGNOSIS — I50.22 CHRONIC SYSTOLIC CHF (CONGESTIVE HEART FAILURE) (HCC): ICD-10-CM

## 2024-04-29 DIAGNOSIS — Z00.00 MEDICARE ANNUAL WELLNESS VISIT, SUBSEQUENT: Primary | ICD-10-CM

## 2024-04-29 DIAGNOSIS — F19.982 DRUG-INDUCED INSOMNIA (HCC): ICD-10-CM

## 2024-04-29 DIAGNOSIS — G10 HUNTINGTON DISEASE (HCC): ICD-10-CM

## 2024-04-29 DIAGNOSIS — I10 BENIGN ESSENTIAL HYPERTENSION: ICD-10-CM

## 2024-04-29 DIAGNOSIS — I47.20 VT (VENTRICULAR TACHYCARDIA) (HCC): ICD-10-CM

## 2024-04-29 DIAGNOSIS — E66.01 SEVERE OBESITY (HCC): ICD-10-CM

## 2024-04-29 DIAGNOSIS — R82.90 ABNORMAL URINE FINDINGS: ICD-10-CM

## 2024-04-29 DIAGNOSIS — N39.0 URINARY TRACT INFECTION WITHOUT HEMATURIA, SITE UNSPECIFIED: ICD-10-CM

## 2024-04-29 DIAGNOSIS — E03.9 HYPOTHYROIDISM, UNSPECIFIED TYPE: ICD-10-CM

## 2024-04-29 DIAGNOSIS — E78.00 HYPERCHOLESTEROLEMIA: ICD-10-CM

## 2024-04-29 DIAGNOSIS — F33.41 RECURRENT MAJOR DEPRESSIVE DISORDER, IN PARTIAL REMISSION (HCC): ICD-10-CM

## 2024-04-29 DIAGNOSIS — Z78.0 MENOPAUSE: ICD-10-CM

## 2024-04-29 LAB
SL AMB  POCT GLUCOSE, UA: NORMAL
SL AMB LEUKOCYTE ESTERASE,UA: 500
SL AMB POCT BILIRUBIN,UA: NORMAL
SL AMB POCT BLOOD,UA: 50
SL AMB POCT CLARITY,UA: CLEAR
SL AMB POCT COLOR,UA: YELLOW
SL AMB POCT KETONES,UA: NORMAL
SL AMB POCT NITRITE,UA: NORMAL
SL AMB POCT PH,UA: 6
SL AMB POCT SPECIFIC GRAVITY,UA: 1.02
SL AMB POCT URINE PROTEIN: NORMAL
SL AMB POCT UROBILINOGEN: 1

## 2024-04-29 PROCEDURE — 99214 OFFICE O/P EST MOD 30 MIN: CPT | Performed by: FAMILY MEDICINE

## 2024-04-29 PROCEDURE — 81003 URINALYSIS AUTO W/O SCOPE: CPT | Performed by: FAMILY MEDICINE

## 2024-04-29 PROCEDURE — G0439 PPPS, SUBSEQ VISIT: HCPCS | Performed by: FAMILY MEDICINE

## 2024-04-29 RX ORDER — CEPHALEXIN 500 MG/1
500 CAPSULE ORAL EVERY 8 HOURS SCHEDULED
Qty: 21 CAPSULE | Refills: 0 | Status: SHIPPED | OUTPATIENT
Start: 2024-04-29 | End: 2024-05-06

## 2024-04-29 RX ORDER — ORLISTAT 120 MG/1
120 CAPSULE ORAL
Qty: 90 CAPSULE | Refills: 2 | Status: SHIPPED | OUTPATIENT
Start: 2024-04-29

## 2024-04-29 NOTE — ASSESSMENT & PLAN NOTE
She says she has no symptoms, no shakiness and the medicine which was given by psychiatrist, was giving her more side effect so she stopped taking it

## 2024-04-29 NOTE — ASSESSMENT & PLAN NOTE
Lab Results   Component Value Date    [id number] 3.10 11/02/2017    TSH 0.68 04/24/2024   Continue same dose of levothyroxine

## 2024-04-29 NOTE — ASSESSMENT & PLAN NOTE
She has persistently revealed 500 leukocytes in urine, will start her on cephalexin, she is asymptomatic, urine is sent for the culture

## 2024-04-29 NOTE — ASSESSMENT & PLAN NOTE
Wt Readings from Last 3 Encounters:   04/29/24 84.4 kg (186 lb)   10/30/23 79.8 kg (176 lb)   08/03/23 77.6 kg (171 lb)     Follows cardiologist her weight is not going down, her cardiologist referred her for weight management but she does not want to go there she had recent echocardiogram and she is stable

## 2024-04-29 NOTE — PROGRESS NOTES
Assessment and Plan:     Problem List Items Addressed This Visit        Cardiovascular and Mediastinum    Benign essential hypertension     Blood pressure remained stable, continue same medications         Relevant Orders    CBC and differential    Comprehensive metabolic panel    Lipid panel    TSH, 3rd generation    Chronic systolic CHF (congestive heart failure) (HCC)     Wt Readings from Last 3 Encounters:   04/29/24 84.4 kg (186 lb)   10/30/23 79.8 kg (176 lb)   08/03/23 77.6 kg (171 lb)     Follows cardiologist her weight is not going down, her cardiologist referred her for weight management but she does not want to go there she had recent echocardiogram and she is stable               VT (ventricular tachycardia) (HCC)     Resolved, she has ICD follows cardiologist            Endocrine    Hypothyroidism     Lab Results   Component Value Date    [id number] 3.10 11/02/2017    TSH 0.68 04/24/2024   Continue same dose of levothyroxine           Relevant Orders    TSH, 3rd generation       Nervous and Auditory    RESOLVED: Dougherty disease (HCC)     She says she has no symptoms, no shakiness and the medicine which was given by psychiatrist, was giving her more side effect so she stopped taking it         Relevant Medications    orlistat (XENICAL) 120 MG capsule       Genitourinary    Urinary tract infection without hematuria    Relevant Medications    cephalexin (KEFLEX) 500 mg capsule       Behavioral Health    Recurrent major depressive disorder, in partial remission (HCC)     Well-controlled on follow psychiatrist         Relevant Medications    orlistat (XENICAL) 120 MG capsule       Obstetrics/Gynecology    Menopause    Relevant Orders    DXA bone density spine hip and pelvis       Neurology/Sleep    Drug-induced insomnia (HCC)       Other    Hypercholesterolemia    Relevant Orders    CBC and differential    Comprehensive metabolic panel    Lipid panel    TSH, 3rd generation    Medicare annual wellness  visit, subsequent - Primary    Abnormal urine findings     She has persistently revealed 500 leukocytes in urine, will start her on cephalexin, she is asymptomatic, urine is sent for the culture         Relevant Orders    POCT urine dip (Completed)    BMI 36.0-36.9,adult    Relevant Medications    orlistat (XENICAL) 120 MG capsule    RESOLVED: Severe obesity (HCC)        Preventive health issues were discussed with patient, and age appropriate screening tests were ordered as noted in patient's After Visit Summary.  Personalized health advice and appropriate referrals for health education or preventive services given if needed, as noted in patient's After Visit Summary.     History of Present Illness:     Patient presents for a Medicare Wellness Visit    AWV, and follow-up.  She has been seeing cardiologist and doing fine, he referred her to weight management but she says she cannot go all the way to that place,  She wants to take some medicine for weight loss,  She had blood work and urine test she does not have any urinary symptoms, she has no vaginal discharge or bleeding,  She refused to go for breast cancer screening as she has ICD and she does not want to mess up with her wires       Patient Care Team:  Raiza Alvarado MD as PCP - General  MD Yadiel Gutiérrez MD     Review of Systems:     Review of Systems   Constitutional:  Negative for chills and fever.   HENT:  Negative for ear pain and sore throat.    Eyes:  Negative for pain and visual disturbance.   Respiratory:  Negative for cough and shortness of breath.    Cardiovascular:  Negative for chest pain and palpitations.   Gastrointestinal:  Negative for abdominal pain and vomiting.   Genitourinary:  Negative for dysuria and hematuria.   Musculoskeletal:  Negative for arthralgias and back pain.   Skin:  Negative for color change and rash.   Neurological:  Negative for seizures and syncope.   All other systems reviewed and are negative.        Problem List:     Patient Active Problem List   Diagnosis   • Allergic rhinitis   • Anxiety   • Benign essential hypertension   • Cardiomyopathy (HCC)   • Hypercholesterolemia   • Hypothyroidism   • Lumbar radiculopathy   • Needs flu shot   • Medicare annual wellness visit, subsequent   • Menopause   • Urinary tract infection without hematuria   • Positive colorectal cancer screening using Cologuard test   • Lipoma of right upper extremity   • PONV (postoperative nausea and vomiting)   • Cardiac pacemaker   • Hypocalcemia   • Osteopenia   • Chronic systolic CHF (congestive heart failure) ([facility])   • History of cardiac catheterization   • Mixed hyperlipidemia   • Hypertensive heart disease with chronic combined systolic and diastolic congestive heart failure ([facility])   • Mixed dyslipidemia   • Recurrent major depressive disorder, in partial remission ([facility])   • Drug-induced insomnia ([facility])   • VT (ventricular tachycardia) ([facility])   • Abnormal urine findings   • BMI 36.0-36.9,adult      Past Medical and Surgical History:     Past Medical History:   Diagnosis Date   • Anxiety    • Cardiac pacemaker     Has had pacemaker since , new pacemaker implanted this past 2020.   • Herpes zoster     onset: 12/6/10   • History of urinary tract infection     Pt states with UTI back in December- treated with ATB- no further symptoms at this time.   • Hyperlipidemia    • Hypertension    • Hypothyroidism     onset: 13   • PONV (postoperative nausea and vomiting)    • Vertigo     onset: 13     Past Surgical History:   Procedure Laterality Date   • CARDIAC PACEMAKER PLACEMENT  2012    ICD-[facility]   •  SECTION     • RI EXCISION TUMOR SOFT TISSUE SHOULDER SUBQ 3 CM/> Right 2021    Procedure: EXCISION BIOPSY TISSUE LESION/MASS UPPER EXTREMITY;  Surgeon: Jose Chaudhary MD;  Location:  MAIN OR;  Service: General      Family History:     Family History   Adopted: Yes   Problem Relation Age of Onset    • Cancer Mother    • Thyroid cancer Sister    • Cancer Sister    • Cancer Brother       Social History:     Social History     Socioeconomic History   • Marital status:      Spouse name: None   • Number of children: 4   • Years of education: None   • Highest education level: None   Occupational History   • None   Tobacco Use   • Smoking status: Never   • Smokeless tobacco: Never   • Tobacco comments:     Denies    Vaping Use   • Vaping status: Never Used   Substance and Sexual Activity   • Alcohol use: No     Comment: Denies    • Drug use: No     Comment: Denies    • Sexual activity: Not Currently     Comment: Denies    Other Topics Concern   • None   Social History Narrative    Single as per [employer]        Most recent tobacco use screenin2019    Do you currently or have you served in the [employer]: No    Were you activated, into active duty, as a member of the [employer] or as a Reservist: No    Advance directive: No    Chewing tobacco: none    Alcohol intake: None    Live alone or with others: alone     Social Determinants of Health     Financial Resource Strain: Unknown (2023)    Overall Financial Resource Strain (CARDIA)    • Difficulty of Paying Living Expenses: Patient declined   Food Insecurity: Not on file   Transportation Needs: Unknown (2023)    PRAPARE - Transportation    • Lack of Transportation (Medical): Patient declined    • Lack of Transportation (Non-Medical): Patient declined   Physical Activity: Not on file   Stress: Not on file   Social Connections: Not on file   Intimate Partner Violence: Not on file   Housing Stability: Not on file      Medications and Allergies:     Current Outpatient Medications   Medication Sig Dispense Refill   • cephalexin (KEFLEX) 500 mg capsule Take 1 capsule (500 mg total) by mouth every 8 (eight) hours for 7 days 21 capsule 0   • orlistat (XENICAL) 120 MG capsule Take 1 capsule (120 mg total) by mouth 3 (three) times a day with  meals 90 capsule 2   • ALPRAZolam (XANAX) 0.25 mg tablet Take 0.25 mg by mouth daily as needed     • atorvastatin (LIPITOR) 20 mg tablet Take 1 tablet (20 mg total) by mouth daily 90 tablet 1   • Calcium Carbonate (Caltrate 600) 1500 (600 Ca) MG TABS Take 1 tablet by mouth 2 (two) times a day 180 tablet 3   • carvedilol (COREG) 12.5 mg tablet Take 12.5 mg by mouth 2 (two) times a day      • doxepin (SINEquan) 10 mg capsule TAKE 1 CAPSULE BY MOUTH AT BEDTIME. TAKE WITH TEMAZEPAM     • escitalopram (LEXAPRO) 20 mg tablet Take 1 tablet by mouth daily  0   • fluticasone (FLONASE) 50 mcg/act nasal spray 2 sprays into each nostril daily 16 g 2   • Icosapent Ethyl 1 g CAPS Take 1 capsule by mouth 2 (two) times a day     • levothyroxine (Euthyrox) 75 mcg tablet Take 1 tablet (75 mcg total) by mouth daily in the early morning 90 tablet 1   • omega-3-acid ethyl esters (LOVAZA) 1 g capsule Take 1 g by mouth 2 (two) times a day     • sacubitril-valsartan (Entresto)  MG TABS Take 1 tablet by mouth 2 (two) times a day       No current facility-administered medications for this visit.     Allergies   Allergen Reactions   • Doxycycline GI Intolerance     Pt states with upset stomach   • Amoxicillin GI Intolerance     Upset stomach      Immunizations:     Immunization History   Administered Date(s) Administered   • COVID-19 PFIZER VACCINE 0.3 ML IM 07/20/2021, 08/11/2021, 02/18/2022   • INFLUENZA 10/18/2019, 10/03/2021   • Influenza, high dose seasonal 0.7 mL 10/30/2018, 01/15/2021, 10/20/2022   • Pneumococcal Conjugate 13-Valent 04/28/2016   • Pneumococcal Conjugate PCV 7 04/04/2017   • Pneumococcal Polysaccharide PPV23 08/19/2017   • Tdap 01/11/2016   • Zoster Vaccine Recombinant 06/13/2021, 10/03/2021   • influenza, trivalent, adjuvanted 10/30/2018, 10/18/2019, 01/15/2021, 10/20/2022      Health Maintenance:         Topic Date Due   • Breast Cancer Screening: Mammogram  07/02/2020   • Colorectal Cancer Screening  10/03/2025    • Hepatitis C Screening  Completed         Topic Date Due   • COVID-19 Vaccine (4 - 2023-24 season) 09/01/2023      Medicare Screening Tests and Risk Assessments:     Myra is here for her Subsequent Wellness visit.     Health Risk Assessment:   Patient rates overall health as good. Patient feels that their physical health rating is same. Patient is satisfied with their life. Eyesight was rated as same. Hearing was rated as same. Patient feels that their emotional and mental health rating is slightly better. Patients states they are never, rarely angry. Patient states they are sometimes unusually tired/fatigued. Pain experienced in the last 7 days has been none.     Depression Screening:   PHQ-9 Score: 0      Fall Risk Screening:   In the past year, patient has experienced: no history of falling in past year      Home Safety:  Patient does not have trouble with stairs inside or outside of their home. Patient has working smoke alarms and has working carbon monoxide detector. Home safety hazards include: none.     Nutrition:   Current diet is Regular.     Medications:   Patient is not currently taking any over-the-counter supplements. Patient is able to manage medications.     Activities of Daily Living (ADLs)/Instrumental Activities of Daily Living (IADLs):   Walk and transfer into and out of bed and chair?: Yes  Dress and groom yourself?: Yes    Bathe or shower yourself?: Yes    Feed yourself? Yes  Do your laundry/housekeeping?: Yes  Manage your money, pay your bills and track your expenses?: Yes  Make your own meals?: Yes    Do your own shopping?: Yes    Previous Hospitalizations:   Any hospitalizations or ED visits within the last 12 months?: No      Advance Care Planning:   Living will: No    Durable POA for healthcare: No    Advanced directive: No    Advanced directive counseling given: Yes      Cognitive Screening:   Provider or family/friend/caregiver concerned regarding cognition?: No    PREVENTIVE  SCREENINGS      Cardiovascular Screening:    General: Screening Not Indicated and History Lipid Disorder      Diabetes Screening:     General: Screening Current      Colorectal Cancer Screening:     General: Screening Current      Breast Cancer Screening:     General: Screening Current and Patient Declines      Cervical Cancer Screening:    General: Screening Not Indicated      Osteoporosis Screening:    General: Risks and Benefits Discussed    Due for: Bone Density Ultrasound      Abdominal Aortic Aneurysm (AAA) Screening:        General: Screening Not Indicated      Lung Cancer Screening:     General: Screening Not Indicated      Hepatitis C Screening:    General: Screening Current    Screening, Brief Intervention, and Referral to Treatment (SBIRT)    Screening  Typical number of drinks in a day: 0  Typical number of drinks in a week: 0  Interpretation: Low risk drinking behavior.    Other Counseling Topics:   Calcium and vitamin D intake and regular weightbearing exercise.     No results found.     Physical Exam:     /70   Pulse 72   Resp 16   Ht 5' (1.524 m)   Wt 84.4 kg (186 lb)   SpO2 96%   BMI 36.33 kg/m²     Physical Exam  Vitals and nursing note reviewed.   Constitutional:       General: She is not in acute distress.     Appearance: She is well-developed. She is obese. She is not ill-appearing.   HENT:      Head: Normocephalic and atraumatic.      Right Ear: Tympanic membrane normal.      Left Ear: Tympanic membrane normal.   Eyes:      Conjunctiva/sclera: Conjunctivae normal.   Cardiovascular:      Rate and Rhythm: Normal rate and regular rhythm.      Heart sounds: No murmur heard.  Pulmonary:      Effort: Pulmonary effort is normal. No respiratory distress.      Breath sounds: Normal breath sounds.   Abdominal:      General: Abdomen is flat.      Palpations: Abdomen is soft.      Tenderness: There is no abdominal tenderness.   Musculoskeletal:         General: No swelling.      Cervical back:  Neck supple.   Skin:     General: Skin is warm and dry.      Capillary Refill: Capillary refill takes less than 2 seconds.   Neurological:      General: No focal deficit present.      Mental Status: She is alert.      Gait: Gait normal.   Psychiatric:         Mood and Affect: Mood normal.          Raiza Alvarado MD

## 2024-04-29 NOTE — PATIENT INSTRUCTIONS
Medicare Preventive Visit Patient Instructions  Thank you for completing your Welcome to Medicare Visit or Medicare Annual Wellness Visit today. Your next wellness visit will be due in one year (4/30/2025).  The screening/preventive services that you may require over the next 5-10 years are detailed below. Some tests may not apply to you based off risk factors and/or age. Screening tests ordered at today's visit but not completed yet may show as past due. Also, please note that scanned in results may not display below.  Preventive Screenings:  Service Recommendations Previous Testing/Comments   Colorectal Cancer Screening  * Colonoscopy    * Fecal Occult Blood Test (FOBT)/Fecal Immunochemical Test (FIT)  * Fecal DNA/Cologuard Test  * Flexible Sigmoidoscopy Age: 45-75 years old   Colonoscopy: every 10 years (may be performed more frequently if at higher risk)  OR  FOBT/FIT: every 1 year  OR  Cologuard: every 3 years  OR  Sigmoidoscopy: every 5 years  Screening may be recommended earlier than age 45 if at higher risk for colorectal cancer. Also, an individualized decision between you and your healthcare provider will decide whether screening between the ages of 76-85 would be appropriate. Colonoscopy: 10/04/2022  FOBT/FIT: Not on file  Cologuard: Not on file  Sigmoidoscopy: Not on file          Breast Cancer Screening Age: 40+ years old  Frequency: every 1-2 years  Not required if history of left and right mastectomy Mammogram: 07/02/2019        Cervical Cancer Screening Between the ages of 21-29, pap smear recommended once every 3 years.   Between the ages of 30-65, can perform pap smear with HPV co-testing every 5 years.   Recommendations may differ for women with a history of total hysterectomy, cervical cancer, or abnormal pap smears in past. Pap Smear: Not on file        Hepatitis C Screening Once for adults born between 1945 and 1965  More frequently in patients at high risk for Hepatitis C Hep C Antibody:  11/11/2020        Diabetes Screening 1-2 times per year if you're at risk for diabetes or have pre-diabetes Fasting glucose: No results in last 5 years (No results in last 5 years)  A1C: 5.4 % (4/8/2024)      Cholesterol Screening Once every 5 years if you don't have a lipid disorder. May order more often based on risk factors. Lipid panel: 10/23/2023          Other Preventive Screenings Covered by Medicare:  Abdominal Aortic Aneurysm (AAA) Screening: covered once if your at risk. You're considered to be at risk if you have a family history of AAA.  Lung Cancer Screening: covers low dose CT scan once per year if you meet all of the following conditions: (1) Age 55-77; (2) No signs or symptoms of lung cancer; (3) Current smoker or have quit smoking within the last 15 years; (4) You have a tobacco smoking history of at least 20 pack years (packs per day multiplied by number of years you smoked); (5) You get a written order from a healthcare provider.  Glaucoma Screening: covered annually if you're considered high risk: (1) You have diabetes OR (2) Family history of glaucoma OR (3)  aged 50 and older OR (4)  American aged 65 and older  Osteoporosis Screening: covered every 2 years if you meet one of the following conditions: (1) You're estrogen deficient and at risk for osteoporosis based off medical history and other findings; (2) Have a vertebral abnormality; (3) On glucocorticoid therapy for more than 3 months; (4) Have primary hyperparathyroidism; (5) On osteoporosis medications and need to assess response to drug therapy.   Last bone density test (DXA Scan): 06/29/2022.  HIV Screening: covered annually if you're between the age of 15-65. Also covered annually if you are younger than 15 and older than 65 with risk factors for HIV infection. For pregnant patients, it is covered up to 3 times per pregnancy.    Immunizations:  Immunization Recommendations   Influenza Vaccine Annual influenza  vaccination during flu season is recommended for all persons aged >= 6 months who do not have contraindications   Pneumococcal Vaccine   * Pneumococcal conjugate vaccine = PCV13 (Prevnar 13), PCV15 (Vaxneuvance), PCV20 (Prevnar 20)  * Pneumococcal polysaccharide vaccine = PPSV23 (Pneumovax) Adults 19-65 yo with certain risk factors or if 65+ yo  If never received any pneumonia vaccine: recommend Prevnar 20 (PCV20)  Give PCV20 if previously received 1 dose of PCV13 or PPSV23   Hepatitis B Vaccine 3 dose series if at intermediate or high risk (ex: diabetes, end stage renal disease, liver disease)   Respiratory syncytial virus (RSV) Vaccine - COVERED BY MEDICARE PART D  * RSVPreF3 (Arexvy) CDC recommends that adults 60 years of age and older may receive a single dose of RSV vaccine using shared clinical decision-making (SCDM)   Tetanus (Td) Vaccine - COST NOT COVERED BY MEDICARE PART B Following completion of primary series, a booster dose should be given every 10 years to maintain immunity against tetanus. Td may also be given as tetanus wound prophylaxis.   Tdap Vaccine - COST NOT COVERED BY MEDICARE PART B Recommended at least once for all adults. For pregnant patients, recommended with each pregnancy.   Shingles Vaccine (Shingrix) - COST NOT COVERED BY MEDICARE PART B  2 shot series recommended in those 19 years and older who have or will have weakened immune systems or those 50 years and older     Health Maintenance Due:      Topic Date Due   • Breast Cancer Screening: Mammogram  07/02/2020   • Colorectal Cancer Screening  10/03/2025   • Hepatitis C Screening  Completed     Immunizations Due:      Topic Date Due   • COVID-19 Vaccine (4 - 2023-24 season) 09/01/2023     Advance Directives   What are advance directives?  Advance directives are legal documents that state your wishes and plans for medical care. These plans are made ahead of time in case you lose your ability to make decisions for yourself. Advance  directives can apply to any medical decision, such as the treatments you want, and if you want to donate organs.   What are the types of advance directives?  There are many types of advance directives, and each state has rules about how to use them. You may choose a combination of any of the following:  Living will:  This is a written record of the treatment you want. You can also choose which treatments you do not want, which to limit, and which to stop at a certain time. This includes surgery, medicine, IV fluid, and tube feedings.   Durable power of  for healthcare (DPAHC):  This is a written record that states who you want to make healthcare choices for you when you are unable to make them for yourself. This person, called a proxy, is usually a family member or a friend. You may choose more than 1 proxy.  Do not resuscitate (DNR) order:  A DNR order is used in case your heart stops beating or you stop breathing. It is a request not to have certain forms of treatment, such as CPR. A DNR order may be included in other types of advance directives.  Medical directive:  This covers the care that you want if you are in a coma, near death, or unable to make decisions for yourself. You can list the treatments you want for each condition. Treatment may include pain medicine, surgery, blood transfusions, dialysis, IV or tube feedings, and a ventilator (breathing machine).  Values history:  This document has questions about your views, beliefs, and how you feel and think about life. This information can help others choose the care that you would choose.  Why are advance directives important?  An advance directive helps you control your care. Although spoken wishes may be used, it is better to have your wishes written down. Spoken wishes can be misunderstood, or not followed. Treatments may be given even if you do not want them. An advance directive may make it easier for your family to make difficult choices about  your care.   Weight Management   Why it is important to manage your weight:  Being overweight increases your risk of health conditions such as heart disease, high blood pressure, type 2 diabetes, and certain types of cancer. It can also increase your risk for osteoarthritis, sleep apnea, and other respiratory problems. Aim for a slow, steady weight loss. Even a small amount of weight loss can lower your risk of health problems.  How to lose weight safely:  A safe and healthy way to lose weight is to eat fewer calories and get regular exercise. You can lose up about 1 pound a week by decreasing the number of calories you eat by 500 calories each day.   Healthy meal plan for weight management:  A healthy meal plan includes a variety of foods, contains fewer calories, and helps you stay healthy. A healthy meal plan includes the following:  Eat whole-grain foods more often.  A healthy meal plan should contain fiber. Fiber is the part of grains, fruits, and vegetables that is not broken down by your body. Whole-grain foods are healthy and provide extra fiber in your diet. Some examples of whole-grain foods are whole-wheat breads and pastas, oatmeal, brown rice, and bulgur.  Eat a variety of vegetables every day.  Include dark, leafy greens such as spinach, kale, sergio greens, and mustard greens. Eat yellow and orange vegetables such as carrots, sweet potatoes, and winter squash.   Eat a variety of fruits every day.  Choose fresh or canned fruit (canned in its own juice or light syrup) instead of juice. Fruit juice has very little or no fiber.  Eat low-fat dairy foods.  Drink fat-free (skim) milk or 1% milk. Eat fat-free yogurt and low-fat cottage cheese. Try low-fat cheeses such as mozzarella and other reduced-fat cheeses.  Choose meat and other protein foods that are low in fat.  Choose beans or other legumes such as split peas or lentils. Choose fish, skinless poultry (chicken or turkey), or lean cuts of red meat  (beef or pork). Before you cook meat or poultry, cut off any visible fat.   Use less fat and oil.  Try baking foods instead of frying them. Add less fat, such as margarine, sour cream, regular salad dressing and mayonnaise to foods. Eat fewer high-fat foods. Some examples of high-fat foods include french fries, doughnuts, ice cream, and cakes.  Eat fewer sweets.  Limit foods and drinks that are high in sugar. This includes candy, cookies, regular soda, and sweetened drinks.  Exercise:  Exercise at least 30 minutes per day on most days of the week. Some examples of exercise include walking, biking, dancing, and swimming. You can also fit in more physical activity by taking the stairs instead of the elevator or parking farther away from stores. Ask your healthcare provider about the best exercise plan for you.      © Copyright [employer] 2018 Information is for End User's use only and may not be sold, redistributed or otherwise used for commercial purposes. All illustrations and images included in CareNotes® are the copyrighted property of [employer].D.A[employer]M., Inc[employer] or [employer]

## 2024-05-06 ENCOUNTER — TELEPHONE (OUTPATIENT)
Dept: FAMILY MEDICINE CLINIC | Facility: CLINIC | Age: 74
End: 2024-05-06

## 2024-05-06 NOTE — RESULT ENCOUNTER NOTE
Please inform the patient her urine culture is positive for infection and she is taking cephalexin as I prescribed her on last visit, she should finish that antibiotic

## 2024-05-06 NOTE — TELEPHONE ENCOUNTER
----- Message from Raiza Alvarado MD sent at 5/6/2024 12:54 PM EDT -----  Please inform the patient her urine culture is positive for infection and she is taking cephalexin as I prescribed her on last visit, she should finish that antibiotic

## 2024-05-29 PROBLEM — N39.0 URINARY TRACT INFECTION WITHOUT HEMATURIA: Status: RESOLVED | Noted: 2019-11-19 | Resolved: 2024-05-29

## 2024-05-29 PROBLEM — Z00.00 MEDICARE ANNUAL WELLNESS VISIT, SUBSEQUENT: Status: RESOLVED | Noted: 2019-10-30 | Resolved: 2024-05-29

## 2024-06-13 ENCOUNTER — OFFICE VISIT (OUTPATIENT)
Dept: FAMILY MEDICINE CLINIC | Facility: CLINIC | Age: 74
End: 2024-06-13
Payer: COMMERCIAL

## 2024-06-13 VITALS
HEART RATE: 64 BPM | HEIGHT: 60 IN | OXYGEN SATURATION: 96 % | BODY MASS INDEX: 36.32 KG/M2 | WEIGHT: 185 LBS | SYSTOLIC BLOOD PRESSURE: 125 MMHG | DIASTOLIC BLOOD PRESSURE: 78 MMHG

## 2024-06-13 DIAGNOSIS — I50.22 CHRONIC SYSTOLIC CHF (CONGESTIVE HEART FAILURE) (HCC): ICD-10-CM

## 2024-06-13 DIAGNOSIS — I10 BENIGN ESSENTIAL HYPERTENSION: Primary | ICD-10-CM

## 2024-06-13 DIAGNOSIS — F33.41 RECURRENT MAJOR DEPRESSIVE DISORDER, IN PARTIAL REMISSION (HCC): ICD-10-CM

## 2024-06-13 DIAGNOSIS — E03.9 HYPOTHYROIDISM, UNSPECIFIED TYPE: ICD-10-CM

## 2024-06-13 PROCEDURE — 99214 OFFICE O/P EST MOD 30 MIN: CPT | Performed by: FAMILY MEDICINE

## 2024-06-13 PROCEDURE — G2211 COMPLEX E/M VISIT ADD ON: HCPCS | Performed by: FAMILY MEDICINE

## 2024-06-13 RX ORDER — TEMAZEPAM 30 MG/1
30 CAPSULE ORAL
COMMUNITY
Start: 2024-05-01

## 2024-06-13 NOTE — ASSESSMENT & PLAN NOTE
Wt Readings from Last 3 Encounters:   06/13/24 83.9 kg (185 lb)   04/29/24 84.4 kg (186 lb)   10/30/23 79.8 kg (176 lb)   His cardiologist overall stable on Entresto

## 2024-06-13 NOTE — PROGRESS NOTES
Ambulatory Visit  Name: Myra Meier      : 1950      MRN: [id number]  Encounter Provider: Raiza Alvarado MD  Encounter Date: 2024   Encounter department: [facility]    Assessment & Plan   1. Benign essential hypertension  Assessment & Plan:  Stable blood pressure and recheck heart rate is normal  2. Chronic systolic CHF (congestive heart failure) (HCC)  Assessment & Plan:  Wt Readings from Last 3 Encounters:   24 83.9 kg (185 lb)   24 84.4 kg (186 lb)   10/30/23 79.8 kg (176 lb)   His cardiologist overall stable on Entresto          3. Hypothyroidism, unspecified type  Assessment & Plan:  TSH is normal continue levothyroxine same dose and recheck labs in . Recurrent major depressive disorder, in partial remission (HCC)  Assessment & Plan:  Follow psychiatrist and stable         History of Present Illness     Follow-up on her labs, she had labs yesterday, she is hypothyroid, cardiomyopathy, congestive heart failure everything stable blood pressure remains okay she is trying to eat healthy, she was given orlistat but was very expensive so she did not take it.  Denies any leg swelling or chest pain,      Review of Systems   Constitutional:  Negative for activity change, appetite change, chills, fatigue, fever and unexpected weight change.   HENT:  Negative for congestion, ear discharge, ear pain, nosebleeds, postnasal drip, rhinorrhea, sinus pressure, sneezing, sore throat, trouble swallowing and voice change.    Eyes:  Negative for photophobia, pain, discharge, redness and itching.   Respiratory:  Negative for cough, chest tightness, shortness of breath and wheezing.    Cardiovascular:  Negative for chest pain, palpitations and leg swelling.   Gastrointestinal:  Negative for abdominal pain, constipation, diarrhea, nausea and vomiting.   Endocrine: Negative for polyuria.   Genitourinary:  Negative for dysuria, frequency and urgency.   Musculoskeletal:   Negative for arthralgias, back pain, myalgias and neck pain.   Skin:  Negative for color change, pallor and rash.   Allergic/Immunologic: Negative for environmental allergies and food allergies.   Neurological:  Negative for dizziness, weakness, light-headedness and headaches.   Hematological:  Negative for adenopathy. Does not bruise/bleed easily.   Psychiatric/Behavioral:  Negative for behavioral problems. The patient is not nervous/anxious.      Past Medical History:   Diagnosis Date   • Anxiety    • Cardiac pacemaker     Has had pacemaker since , new pacemaker implanted this past fall 2020.   • Herpes zoster     onset: 12/6/10   • History of urinary tract infection     Pt states with UTI back in December- treated with ATB- no further symptoms at this time.   • Hyperlipidemia    • Hypertension    • Hypothyroidism     onset: 13   • PONV (postoperative nausea and vomiting)    • Vertigo     onset: 13     Past Surgical History:   Procedure Laterality Date   • CARDIAC PACEMAKER PLACEMENT  2012    ICD-[facility]   •  SECTION     • TX EXCISION TUMOR SOFT TISSUE SHOULDER SUBQ 3 CM/> Right 2021    Procedure: EXCISION BIOPSY TISSUE LESION/MASS UPPER EXTREMITY;  Surgeon: Jose Chaudhary MD;  Location: [facility] OR;  Service: General     Family History   Adopted: Yes   Problem Relation Age of Onset   • Cancer Mother    • Thyroid cancer Sister    • Cancer Sister    • Cancer Brother      Social History     Tobacco Use   • Smoking status: Never   • Smokeless tobacco: Never   • Tobacco comments:     Denies    Vaping Use   • Vaping status: Never Used   Substance and Sexual Activity   • Alcohol use: No     Comment: Denies    • Drug use: No     Comment: Denies    • Sexual activity: Not Currently     Comment: Denies      Current Outpatient Medications on File Prior to Visit   Medication Sig   • ALPRAZolam (XANAX) 0.25 mg tablet Take 0.25 mg by mouth daily as needed   • atorvastatin (LIPITOR) 20  mg tablet Take 1 tablet (20 mg total) by mouth daily   • carvedilol (COREG) 12.5 mg tablet Take 12.5 mg by mouth 2 (two) times a day    • doxepin (SINEquan) 10 mg capsule TAKE 1 CAPSULE BY MOUTH AT BEDTIME. TAKE WITH TEMAZEPAM   • escitalopram (LEXAPRO) 20 mg tablet Take 1 tablet by mouth daily   • fluticasone (FLONASE) 50 mcg/act nasal spray 2 sprays into each nostril daily   • Icosapent Ethyl 1 g CAPS Take 1 capsule by mouth 2 (two) times a day   • levothyroxine (Euthyrox) 75 mcg tablet Take 1 tablet (75 mcg total) by mouth daily in the early morning   • omega-3-acid ethyl esters (LOVAZA) 1 g capsule Take 1 g by mouth 2 (two) times a day   • sacubitril-valsartan (Entresto)  MG TABS Take 1 tablet by mouth 2 (two) times a day   • temazepam (RESTORIL) 30 mg capsule Take 30 mg by mouth daily at bedtime   • [DISCONTINUED] orlistat (XENICAL) 120 MG capsule Take 1 capsule (120 mg total) by mouth 3 (three) times a day with meals   • Calcium Carbonate (Caltrate 600) 1500 (600 Ca) MG TABS Take 1 tablet by mouth 2 (two) times a day     Allergies   Allergen Reactions   • Doxycycline GI Intolerance     Pt states with upset stomach   • Amoxicillin GI Intolerance     Upset stomach     Immunization History   Administered Date(s) Administered   • COVID-19 PFIZER VACCINE 0.3 ML IM 07/20/2021, 08/11/2021, 02/18/2022   • INFLUENZA 10/18/2019, 10/03/2021, 09/07/2023   • Influenza, high dose seasonal 0.7 mL 10/30/2018, 01/15/2021, 10/20/2022   • Pneumococcal Conjugate 13-Valent 04/28/2016   • Pneumococcal Conjugate PCV 7 04/04/2017   • Pneumococcal Polysaccharide PPV23 08/19/2017   • Tdap 01/11/2016   • Zoster Vaccine Recombinant 06/13/2021, 10/03/2021   • influenza, trivalent, adjuvanted 10/30/2018, 10/18/2019, 01/15/2021, 10/20/2022     Objective     /78   Pulse 64   Ht 5' (1.524 m)   Wt 83.9 kg (185 lb)   SpO2 96%   BMI 36.13 kg/m²     Physical Exam  Vitals and nursing note reviewed.   Constitutional:        Appearance: Normal appearance. She is well-developed. She is not ill-appearing.   HENT:      Head: Normocephalic and atraumatic.      Mouth/Throat:      Mouth: Mucous membranes are moist.   Eyes:      General: No scleral icterus.        Right eye: No discharge.         Left eye: No discharge.      Extraocular Movements: Extraocular movements intact.      Conjunctiva/sclera: Conjunctivae normal.   Neck:      Thyroid: No thyromegaly.      Trachea: No tracheal deviation.   Cardiovascular:      Rate and Rhythm: Normal rate and regular rhythm.      Heart sounds: Normal heart sounds. No murmur heard.  Pulmonary:      Effort: No respiratory distress.      Breath sounds: Normal breath sounds. No wheezing or rales.   Musculoskeletal:         General: Normal range of motion.      Cervical back: Normal range of motion and neck supple.      Right lower leg: No edema.      Left lower leg: No edema.   Lymphadenopathy:      Cervical: No cervical adenopathy.   Skin:     General: Skin is warm.      Coloration: Skin is not pale.      Findings: No erythema or rash.   Neurological:      Mental Status: She is alert and oriented to person, place, and time.      Cranial Nerves: No cranial nerve deficit.      Deep Tendon Reflexes: Reflexes are normal and symmetric.   Psychiatric:         Behavior: Behavior normal.     Depression Screening Follow-up Plan: Patient's depression screening was positive with a PHQ-2 score of . Their PHQ-9 score was 0. Continue regular follow-up with their psychologist/therapist/psychiatrist who is managing their mental health condition(s).  Administrative Statements

## 2024-07-26 RX ORDER — ACETAMINOPHEN 160 MG
2000 TABLET,DISINTEGRATING ORAL DAILY
COMMUNITY
Start: 2024-06-06

## 2024-08-01 ENCOUNTER — VBI (OUTPATIENT)
Dept: ADMINISTRATIVE | Facility: OTHER | Age: 74
End: 2024-08-01

## 2024-08-01 NOTE — TELEPHONE ENCOUNTER
08/01/24 3:04 PM     Chart reviewed for Mammogram ; nothing is submitted to the patient's insurance at this time.     Ana Vale   [facility]

## 2024-09-19 ENCOUNTER — HOSPITAL ENCOUNTER (OUTPATIENT)
Dept: RADIOLOGY | Facility: HOSPITAL | Age: 74
Discharge: HOME | End: 2024-09-19
Attending: FAMILY MEDICINE
Payer: COMMERCIAL

## 2024-09-19 ENCOUNTER — HOSPITAL ENCOUNTER (OUTPATIENT)
Dept: RADIOLOGY | Facility: HOSPITAL | Age: 74
End: 2024-09-19
Payer: COMMERCIAL

## 2024-09-19 VITALS — HEIGHT: 59 IN | WEIGHT: 180 LBS | BODY MASS INDEX: 36.29 KG/M2

## 2024-09-19 DIAGNOSIS — Z78.0 MENOPAUSE: ICD-10-CM

## 2024-09-19 PROCEDURE — 77080 DXA BONE DENSITY AXIAL: CPT

## 2024-09-24 ENCOUNTER — TELEPHONE (OUTPATIENT)
Dept: FAMILY MEDICINE CLINIC | Facility: CLINIC | Age: 74
End: 2024-09-24

## 2024-09-24 NOTE — TELEPHONE ENCOUNTER
----- Message from Raiza Alvarado MD sent at 9/24/2024 12:38 PM EDT -----  Please inform the patient DEXA scan shows osteopenia, she has some weakness in the bones, she will continue calcium 1200 mg daily and vitamin D 1000 unit daily and do regular weightbearing exercise and next DEXA will be in 2 years

## 2024-11-13 LAB
ALBUMIN SERPL-MCNC: 3.9 G/DL (ref 3.5–5.7)
ALP SERPL-CCNC: 62 U/L (ref 35–120)
ALT SERPL-CCNC: 15 U/L
ANION GAP SERPL CALCULATED.3IONS-SCNC: 8 MMOL/L (ref 3–11)
AST SERPL-CCNC: 19 U/L
BASOPHILS # BLD AUTO: 0 THOU/CMM (ref 0–0.1)
BASOPHILS NFR BLD AUTO: 1 %
BILIRUB SERPL-MCNC: 0.7 MG/DL (ref 0.2–1)
BUN SERPL-MCNC: 13 MG/DL (ref 7–25)
CALCIUM SERPL-MCNC: 8.8 MG/DL (ref 8.5–10.5)
CHLORIDE SERPL-SCNC: 106 MMOL/L (ref 100–109)
CHOLEST SERPL-MCNC: 145 MG/DL
CHOLEST/HDLC SERPL: 3.1 {RATIO}
CO2 SERPL-SCNC: 29 MMOL/L (ref 21–31)
CREAT SERPL-MCNC: 0.88 MG/DL (ref 0.4–1.1)
CYTOLOGY CMNT CVX/VAG CYTO-IMP: ABNORMAL
DIFFERENTIAL METHOD BLD: ABNORMAL
EOSINOPHIL # BLD AUTO: 0.2 THOU/CMM (ref 0–0.5)
EOSINOPHIL NFR BLD AUTO: 3 %
ERYTHROCYTE [DISTWIDTH] IN BLOOD BY AUTOMATED COUNT: 14.3 % (ref 12–16)
GFR/BSA.PRED SERPLBLD CYS-BASED-ARV: 69 ML/MIN/{1.73_M2}
GLUCOSE SERPL-MCNC: 104 MG/DL (ref 65–99)
HCT VFR BLD AUTO: 42.5 % (ref 35–43)
HDLC SERPL-MCNC: 47 MG/DL (ref 23–92)
HGB BLD-MCNC: 14.3 G/DL (ref 11.5–14.5)
LDLC SERPL CALC-MCNC: 75 MG/DL
LYMPHOCYTES # BLD AUTO: 2.2 THOU/CMM (ref 1–3)
LYMPHOCYTES NFR BLD AUTO: 36 %
MCH RBC QN AUTO: 29.1 PG (ref 26–34)
MCHC RBC AUTO-ENTMCNC: 33.7 G/DL (ref 32–37)
MCV RBC AUTO: 86 FL (ref 80–100)
MONOCYTES # BLD AUTO: 0.2 THOU/CMM (ref 0.3–1)
MONOCYTES NFR BLD AUTO: 4 %
NEUTROPHILS # BLD AUTO: 3.4 THOU/CMM (ref 1.8–7.8)
NEUTROPHILS NFR BLD AUTO: 56 %
NONHDLC SERPL-MCNC: 98 MG/DL
PLATELET # BLD AUTO: 203 THOU/CMM (ref 140–350)
PMV BLD REES-ECKER: 8 FL (ref 7.5–11.3)
POTASSIUM SERPL-SCNC: 4.5 MMOL/L (ref 3.5–5.2)
PROT SERPL-MCNC: 6.4 G/DL (ref 6.3–8.3)
RBC # BLD AUTO: 4.92 MILL/CMM (ref 3.7–4.7)
SODIUM SERPL-SCNC: 143 MMOL/L (ref 135–145)
TRIGL SERPL-MCNC: 114 MG/DL
TSH SERPL-ACNC: 4.53 UIU/ML (ref 0.45–5.33)
WBC # BLD AUTO: 6.1 THOU/CMM (ref 4–10)

## 2024-11-14 ENCOUNTER — RESULTS FOLLOW-UP (OUTPATIENT)
Dept: FAMILY MEDICINE CLINIC | Facility: CLINIC | Age: 74
End: 2024-11-14

## 2024-11-19 ENCOUNTER — RESULTS FOLLOW-UP (OUTPATIENT)
Dept: FAMILY MEDICINE CLINIC | Facility: CLINIC | Age: 74
End: 2024-11-19

## 2024-12-09 ENCOUNTER — OFFICE VISIT (OUTPATIENT)
Dept: FAMILY MEDICINE CLINIC | Facility: CLINIC | Age: 74
End: 2024-12-09
Payer: COMMERCIAL

## 2024-12-09 VITALS
HEIGHT: 59 IN | RESPIRATION RATE: 16 BRPM | WEIGHT: 193 LBS | OXYGEN SATURATION: 96 % | SYSTOLIC BLOOD PRESSURE: 120 MMHG | DIASTOLIC BLOOD PRESSURE: 70 MMHG | HEART RATE: 72 BPM | BODY MASS INDEX: 38.91 KG/M2

## 2024-12-09 DIAGNOSIS — R73.01 IMPAIRED FASTING GLUCOSE: ICD-10-CM

## 2024-12-09 DIAGNOSIS — I50.22 CHRONIC SYSTOLIC CHF (CONGESTIVE HEART FAILURE) (HCC): ICD-10-CM

## 2024-12-09 DIAGNOSIS — I42.9 CARDIOMYOPATHY, UNSPECIFIED TYPE (HCC): ICD-10-CM

## 2024-12-09 DIAGNOSIS — E03.9 HYPOTHYROIDISM, UNSPECIFIED TYPE: Primary | ICD-10-CM

## 2024-12-09 PROCEDURE — G2211 COMPLEX E/M VISIT ADD ON: HCPCS | Performed by: FAMILY MEDICINE

## 2024-12-09 PROCEDURE — 99214 OFFICE O/P EST MOD 30 MIN: CPT | Performed by: FAMILY MEDICINE

## 2024-12-09 RX ORDER — LEVOTHYROXINE SODIUM 75 UG/1
75 TABLET ORAL
Qty: 100 TABLET | Refills: 1 | Status: SHIPPED | OUTPATIENT
Start: 2024-12-09

## 2024-12-09 NOTE — PROGRESS NOTES
Name: Myra Meier      : 1950      MRN: [id number]  Encounter Provider: Raiza Alvarado MD  Encounter Date: 2024   Encounter department: [facility]    Assessment & Plan  Chronic systolic CHF (congestive heart failure) (HCC)  Wt Readings from Last 3 Encounters:   24 87.5 kg (193 lb)   24 81.6 kg (180 lb)   24 83.9 kg (185 lb)   Stable , was cardiologist and form is filled for handicap sticker, as she says she get easily tired after walking especially if she has to walk fast but there is no chest pain or shortness of breath on rest                 Cardiomyopathy, unspecified type (HCC)  Stable and follows cardiologist and on Entresto       Hypothyroidism, unspecified type    Orders:    levothyroxine (Euthyrox) 75 mcg tablet; Take 1 tablet (75 mcg total) by mouth daily in the early morning    TSH, 3rd generation; Future    Impaired fasting glucose  Advised to have low-carb diet and low sugar diet, she needs to lose weight  Orders:    Hemoglobin A1C; Future    Lab Results   Component Value Date    HGBA1C 5.4 2024            History of Present Illness     Follow-up, she had labs, she has cardiomyopathy, congestive heart failure and stable on medications, taking medication as advised, she has a pacemaker, she needs a form to be filled for handicap sticker which she has been using for many years, due to her heart condition, says when she has to walk fast then she get easily tired, no recent chest pain or shortness of breath      Review of Systems   Constitutional: Negative.    HENT: Negative.     Eyes: Negative.    Respiratory: Negative.     Cardiovascular: Negative.    Gastrointestinal: Negative.    Musculoskeletal: Negative.    Neurological: Negative.    Psychiatric/Behavioral: Negative.       Past Medical History:   Diagnosis Date    Anxiety     Cardiac pacemaker     Has had pacemaker since , new pacemaker implanted this past fall 2020.    Herpes zoster      onset: 12/6/10    History of urinary tract infection     Pt states with UTI back in December- treated with ATB- no further symptoms at this time.    Hyperlipidemia     Hypertension     Hypothyroidism     onset: 12    PONV (postoperative nausea and vomiting)     Vertigo     onset: 12     Past Surgical History:   Procedure Laterality Date    CARDIAC PACEMAKER PLACEMENT  2012    ICD-Biotronik-Lumax     SECTION      VT EXCISION TUMOR SOFT TISSUE SHOULDER SUBQ 3 CM/> Right 2021    Procedure: EXCISION BIOPSY TISSUE LESION/MASS UPPER EXTREMITY;  Surgeon: Jose Chaudhary MD;  Location:  MAIN OR;  Service: General     Family History   Adopted: Yes   Problem Relation Age of Onset    Cancer Mother     Thyroid cancer Sister     Cancer Sister     Cancer Brother      Social History     Tobacco Use    Smoking status: Never    Smokeless tobacco: Never    Tobacco comments:     Denies    Vaping Use    Vaping status: Never Used   Substance and Sexual Activity    Alcohol use: No     Comment: Denies     Drug use: No     Comment: Denies     Sexual activity: Not Currently     Comment: Denies      Current Outpatient Medications on File Prior to Visit   Medication Sig    ALPRAZolam (XANAX) 0.25 mg tablet Take 0.25 mg by mouth daily as needed    atorvastatin (LIPITOR) 20 mg tablet Take 1 tablet (20 mg total) by mouth daily    Calcium Carbonate (Caltrate 600) 1500 (600 Ca) MG TABS Take 1 tablet by mouth 2 (two) times a day    carvedilol (COREG) 12.5 mg tablet Take 12.5 mg by mouth 2 (two) times a day     Cholecalciferol (Vitamin D3) 50 MCG (2000 UT) capsule Take 2,000 Units by mouth daily    doxepin (SINEquan) 10 mg capsule TAKE 1 CAPSULE BY MOUTH AT BEDTIME. TAKE WITH TEMAZEPAM    escitalopram (LEXAPRO) 20 mg tablet Take 1 tablet by mouth daily    fluticasone (FLONASE) 50 mcg/act nasal spray 2 sprays into each nostril daily    Icosapent Ethyl 1 g CAPS Take 1 capsule by mouth 2 (two) times a day     "omega-3-acid ethyl esters (LOVAZA) 1 g capsule Take 1 g by mouth 2 (two) times a day    sacubitril-valsartan (Entresto)  MG TABS Take 1 tablet by mouth 2 (two) times a day    temazepam (RESTORIL) 30 mg capsule Take 30 mg by mouth daily at bedtime    [DISCONTINUED] levothyroxine (Euthyrox) 75 mcg tablet Take 1 tablet (75 mcg total) by mouth daily in the early morning     Allergies   Allergen Reactions    Doxycycline GI Intolerance     Pt states with upset stomach    Amoxicillin GI Intolerance     Upset stomach     Immunization History   Administered Date(s) Administered    COVID-19 PFIZER VACCINE 0.3 ML IM 07/20/2021, 08/11/2021, 02/18/2022    INFLUENZA 10/18/2019, 10/03/2021, 09/07/2023    Influenza, high dose seasonal 0.7 mL 10/30/2018, 01/15/2021, 10/20/2022    Pneumococcal Conjugate 13-Valent 04/28/2016    Pneumococcal Conjugate PCV 7 04/04/2017    Pneumococcal Polysaccharide PPV23 08/19/2017    Tdap 01/11/2016    Zoster Vaccine Recombinant 06/13/2021, 10/03/2021    influenza, trivalent, adjuvanted 10/30/2018, 10/18/2019, 01/15/2021, 10/20/2022     Objective   /70   Pulse 72   Resp 16   Ht 4' 11\" (1.499 m)   Wt 87.5 kg (193 lb)   SpO2 96%   BMI 38.98 kg/m²     Physical Exam  Vitals and nursing note reviewed.   Constitutional:       Appearance: She is obese.   Cardiovascular:      Rate and Rhythm: Normal rate.      Heart sounds: No murmur heard.  Pulmonary:      Effort: Pulmonary effort is normal.   Abdominal:      General: There is no distension.   Musculoskeletal:      Cervical back: Normal range of motion.   Neurological:      General: No focal deficit present.   Psychiatric:         Mood and Affect: Mood normal.         "

## 2024-12-09 NOTE — ASSESSMENT & PLAN NOTE
Orders:    levothyroxine (Euthyrox) 75 mcg tablet; Take 1 tablet (75 mcg total) by mouth daily in the early morning    TSH, 3rd generation; Future

## 2024-12-09 NOTE — ASSESSMENT & PLAN NOTE
Wt Readings from Last 3 Encounters:   12/09/24 87.5 kg (193 lb)   09/19/24 81.6 kg (180 lb)   06/13/24 83.9 kg (185 lb)   Stable , was cardiologist and form is filled for handicap sticker, as she says she get easily tired after walking especially if she has to walk fast but there is no chest pain or shortness of breath on rest

## 2025-01-03 ENCOUNTER — HOSPITAL ENCOUNTER (EMERGENCY)
Facility: HOSPITAL | Age: 75
Discharge: HOME | End: 2025-01-03
Attending: EMERGENCY MEDICINE
Payer: COMMERCIAL

## 2025-01-03 VITALS
DIASTOLIC BLOOD PRESSURE: 70 MMHG | TEMPERATURE: 99.9 F | RESPIRATION RATE: 18 BRPM | SYSTOLIC BLOOD PRESSURE: 117 MMHG | OXYGEN SATURATION: 94 % | HEART RATE: 97 BPM

## 2025-01-03 DIAGNOSIS — J18.9 PNEUMONIA OF RIGHT LOWER LOBE DUE TO INFECTIOUS ORGANISM: Primary | ICD-10-CM

## 2025-01-03 LAB
BASE EX.OXY STD BLDV CALC-SCNC: 82.3 % (ref 60–80)
BASE EXCESS BLDV CALC-SCNC: -1 MMOL/L
HCO3 BLDV-SCNC: 23.3 MMOL/L (ref 24–30)
O2 CT BLDV-SCNC: 17.9 ML/DL
PCO2 BLDV: 37.9 MM HG (ref 42–50)
PH BLDV: 7.41 [PH] (ref 7.3–7.4)
PO2 BLDV: 47 MM HG (ref 35–45)

## 2025-01-03 PROCEDURE — 94640 AIRWAY INHALATION TREATMENT: CPT

## 2025-01-03 PROCEDURE — 96365 THER/PROPH/DIAG IV INF INIT: CPT

## 2025-01-03 PROCEDURE — 36415 COLL VENOUS BLD VENIPUNCTURE: CPT | Performed by: EMERGENCY MEDICINE

## 2025-01-03 PROCEDURE — 82805 BLOOD GASES W/O2 SATURATION: CPT | Performed by: EMERGENCY MEDICINE

## 2025-01-03 PROCEDURE — 99285 EMERGENCY DEPT VISIT HI MDM: CPT

## 2025-01-03 PROCEDURE — 99284 EMERGENCY DEPT VISIT MOD MDM: CPT | Performed by: EMERGENCY MEDICINE

## 2025-01-03 PROCEDURE — 96367 TX/PROPH/DG ADDL SEQ IV INF: CPT

## 2025-01-03 RX ORDER — AZITHROMYCIN 250 MG/1
250 TABLET, FILM COATED ORAL EVERY 24 HOURS
Qty: 4 TABLET | Refills: 0 | Status: SHIPPED | OUTPATIENT
Start: 2025-01-03 | End: 2025-01-07

## 2025-01-03 RX ORDER — IPRATROPIUM BROMIDE AND ALBUTEROL SULFATE 2.5; .5 MG/3ML; MG/3ML
3 SOLUTION RESPIRATORY (INHALATION) ONCE
Status: COMPLETED | OUTPATIENT
Start: 2025-01-03 | End: 2025-01-03

## 2025-01-03 RX ORDER — CEFPODOXIME PROXETIL 200 MG/1
200 TABLET, FILM COATED ORAL 2 TIMES DAILY
Qty: 14 TABLET | Refills: 0 | Status: SHIPPED | OUTPATIENT
Start: 2025-01-03 | End: 2025-01-10

## 2025-01-03 RX ORDER — ALBUTEROL SULFATE 90 UG/1
4 INHALANT RESPIRATORY (INHALATION) ONCE
Status: COMPLETED | OUTPATIENT
Start: 2025-01-03 | End: 2025-01-03

## 2025-01-03 RX ORDER — CEFTRIAXONE 1 G/50ML
1000 INJECTION, SOLUTION INTRAVENOUS ONCE
Status: COMPLETED | OUTPATIENT
Start: 2025-01-03 | End: 2025-01-03

## 2025-01-03 RX ADMIN — ALBUTEROL SULFATE 4 PUFF: 108 AEROSOL, METERED RESPIRATORY (INHALATION) at 17:25

## 2025-01-03 RX ADMIN — AZITHROMYCIN MONOHYDRATE 500 MG: 500 INJECTION, POWDER, LYOPHILIZED, FOR SOLUTION INTRAVENOUS at 16:17

## 2025-01-03 RX ADMIN — IPRATROPIUM BROMIDE AND ALBUTEROL SULFATE 3 ML: .5; 3 SOLUTION RESPIRATORY (INHALATION) at 15:34

## 2025-01-03 RX ADMIN — CEFTRIAXONE 1000 MG: 1 INJECTION, SOLUTION INTRAVENOUS at 15:34

## 2025-01-03 NOTE — ED PROVIDER NOTES
Time reflects when diagnosis was documented in both MDM as applicable and the Disposition within this note       Time User Action Codes Description Comment    1/3/2025  4:58 PM Roel Enciso Add [J18.9] Pneumonia of right lower lobe due to infectious organism           ED Disposition       ED Disposition   Discharge    Condition   Stable    Date/Time   Fri Mir 3, 2025  5:01 PM    Comment   Myra Meier discharge to home/self care.                   Assessment & Plan       Medical Decision Making  Pneumonia: Low risk and appropriate for outpatient treatment.  I did discuss indications to return to [facility].  Shared decision making utilized.    Amount and/or Complexity of Data Reviewed  Labs: ordered.  Radiology:      Details: Reviewed from care    Risk  Prescription drug management.        ED Course as of 01/04/25 0058   Fri Jan 03, 2025   1527 Curb 65 score of 1 on the basis of age.  PSI score likely to be III although CXR review and pH still pending.  Will treat with IV antibiotics per urgent center care plan.  Will follow up-to-date community acquired pneumonia treatment algorithm and due to age use dual agent of rocephin and azithromycin.   1542 CXR reviewed with right lower lobe infiltrate and no effusion.   1545 pO2 on VBG is 47, so on ABG value definitely > 60.  PSI score 73, placing patient in category III.  Will utilized shared decision making.   1657 Patient reports feeling somewhat improved after nebulizer.  On repeat pulmonary exam, she has improved wheezing and lung base on right side but still mild residual wheezing remains.  Shared decision making utilized, patient strongly prefers to go home but return precautions discussed.       Medications   cefTRIAXone (ROCEPHIN) IVPB (premix in dextrose) 1,000 mg 50 mL (0 mg Intravenous Stopped 1/3/25 1617)   azithromycin (ZITHROMAX) 500 mg in sodium chloride 0.9% 250mL IVPB 500 mg (0 mg Intravenous Stopped 1/3/25 1725)   ipratropium-albuterol  (DUO-NEB) 0.5-2.5 mg/3 mL inhalation solution 3 mL (3 mL Nebulization Given 1/3/25 1534)   albuterol (PROVENTIL HFA,VENTOLIN HFA) inhaler 4 puff (4 puffs Inhalation Given 1/3/25 1725)       ED Risk Strat Scores                          SBIRT 20yo+      Flowsheet Row Most Recent Value   Initial Alcohol Screen: US AUDIT-C     1. How often do you have a drink containing alcohol? 0 Filed at: 2025 960   2. How many drinks containing alcohol do you have on a typical day you are drinking?  0 Filed at: 2025 9544   3b. FEMALE Any Age, or MALE 65+: How often do you have 4 or more drinks on one occassion? 0 Filed at: 2025   Audit-C Score 0 Filed at: 2025   SHABNAM: How many times in the past year have you...    Used an illegal drug or used a prescription medication for non-medical reasons? Never Filed at: 2025 847                            History of Present Illness       Chief Complaint   Patient presents with    Shortness of Breath     Patient referred by [facility] due to Pneumonia. Pt c/o shortness of breath, headache, nausea, and productive cough that started Tuesday.        Past Medical History:   Diagnosis Date    Anxiety     Cardiac pacemaker     Has had pacemaker since , new pacemaker implanted this past 2020.    Herpes zoster     onset: 12/6/10    History of urinary tract infection     Pt states with UTI back in December- treated with ATB- no further symptoms at this time.    Hyperlipidemia     Hypertension     Hypothyroidism     onset: 12    PONV (postoperative nausea and vomiting)     Vertigo     onset: 12      Past Surgical History:   Procedure Laterality Date    CARDIAC PACEMAKER PLACEMENT  2012    ICD-Biotronik-Lumax     SECTION      FL EXCISION TUMOR SOFT TISSUE SHOULDER SUBQ 3 CM/> Right 2021    Procedure: EXCISION BIOPSY TISSUE LESION/MASS UPPER EXTREMITY;  Surgeon: Jose Chaudhary MD;  Location:  MAIN OR;  Service: General       Family History   Adopted: Yes   Problem Relation Age of Onset    Cancer Mother     Thyroid cancer Sister     Cancer Sister     Cancer Brother       Social History     Tobacco Use    Smoking status: Never    Smokeless tobacco: Never    Tobacco comments:     Denies    Vaping Use    Vaping status: Never Used   Substance Use Topics    Alcohol use: No     Comment: Denies     Drug use: No     Comment: Denies       E-Cigarette/Vaping    E-Cigarette Use Never User       E-Cigarette/Vaping Substances      I have reviewed and agree with the history as documented.     Patient reports approximately 1 week history of muscle aches, nasal congestion, cough, and fevers.  She was seen in an urgent care center today and had a chest x-ray that she was told shows pneumonia in addition to having lab work.  She was referred to the emergency department for treatment.  Although triage she lists shortness of breath is a chief complaint, patient tells me she is not actually feeling short of breath including when she walks.  She notes her cough is her worst complaint.      [facility]  She had labs drawn today showing: covid and flu: negative, WBC 7.3, Hb 14.8, Pl 140, Na 139, K 3.8, Cl 103, ionized Ca 1.13, CO2 22, Glucose 117, BUN 11, Cr 0.9      Shortness of Breath      Review of Systems   Respiratory:  Positive for shortness of breath.    All other systems reviewed and are negative.          Objective       ED Triage Vitals   Temperature Pulse Blood Pressure Respirations SpO2 Patient Position - Orthostatic VS   01/03/25 1456 01/03/25 1456 01/03/25 1456 01/03/25 1456 01/03/25 1456 01/03/25 1456   99.9 °F (37.7 °C) 99 161/97 18 95 % Lying      Temp Source Heart Rate Source BP Location FiO2 (%) Pain Score    01/03/25 1456 01/03/25 1456 01/03/25 1456 -- 01/03/25 1729    Oral Monitor Left arm  No Pain      Vitals      Date and Time Temp Pulse SpO2 Resp BP Pain Score FACES Pain Rating User   01/03/25  1729 -- 97 94 % 18 117/70 No Pain -- FN   01/03/25 1547 -- 97 98 % 18 -- -- -- FN   01/03/25 1456 99.9 °F (37.7 °C) 99 95 % 18 161/97 -- -- DG            Physical Exam  Vitals and nursing note reviewed.   Constitutional:       General: She is not in acute distress.     Appearance: She is well-developed.   HENT:      Head: Normocephalic and atraumatic.   Eyes:      Conjunctiva/sclera: Conjunctivae normal.   Cardiovascular:      Rate and Rhythm: Normal rate and regular rhythm.      Heart sounds: No murmur heard.  Pulmonary:      Effort: Pulmonary effort is normal. No respiratory distress.      Breath sounds: Examination of the right-lower field reveals wheezing. Wheezing present.   Abdominal:      Palpations: Abdomen is soft.      Tenderness: There is no abdominal tenderness.   Musculoskeletal:         General: No swelling.      Cervical back: Neck supple.      Right lower leg: No edema.      Left lower leg: No edema.   Skin:     General: Skin is warm and dry.      Capillary Refill: Capillary refill takes less than 2 seconds.   Neurological:      General: No focal deficit present.      Mental Status: She is alert.   Psychiatric:         Mood and Affect: Mood normal.         Results Reviewed       Procedure Component Value Units Date/Time    Blood gas, venous [[id number]]  (Abnormal) Collected: 01/03/25 1540    Lab Status: Final result Specimen: Blood from Arm, Right Updated: 01/03/25 1545     pH, Teofilo 7.407     pCO2, Teofilo 37.9 mm Hg      pO2, Teofilo 47.0 mm Hg      HCO3, Teofilo 23.3 mmol/L      Base Excess, Teofilo -1.0 mmol/L      O2 Content, Teofilo 17.9 ml/dL      O2 HGB, VENOUS 82.3 %             No orders to display       Procedures    ED Medication and Procedure Management   Prior to Admission Medications   Prescriptions Last Dose Informant Patient Reported? Taking?   ALPRAZolam (XANAX) 0.25 mg tablet   Yes No   Sig: Take 0.25 mg by mouth daily as needed   Calcium Carbonate (Caltrate 600) 1500 (600 Ca) MG TABS  Self No No   Sig:  Take 1 tablet by mouth 2 (two) times a day   Cholecalciferol (Vitamin D3) 50 MCG (2000 UT) capsule   Yes No   Sig: Take 2,000 Units by mouth daily   Icosapent Ethyl 1 g CAPS  Self Yes No   Sig: Take 1 capsule by mouth 2 (two) times a day   atorvastatin (LIPITOR) 20 mg tablet   No No   Sig: Take 1 tablet (20 mg total) by mouth daily   carvedilol (COREG) 12.5 mg tablet  Self Yes No   Sig: Take 12.5 mg by mouth 2 (two) times a day    doxepin (SINEquan) 10 mg capsule   Yes No   Sig: TAKE 1 CAPSULE BY MOUTH AT BEDTIME. TAKE WITH TEMAZEPAM   escitalopram (LEXAPRO) 20 mg tablet  Self Yes No   Sig: Take 1 tablet by mouth daily   fluticasone (FLONASE) 50 mcg/act nasal spray  Self No No   Si sprays into each nostril daily   levothyroxine (Euthyrox) 75 mcg tablet   No No   Sig: Take 1 tablet (75 mcg total) by mouth daily in the early morning   omega-3-acid ethyl esters (LOVAZA) 1 g capsule  Self Yes No   Sig: Take 1 g by mouth 2 (two) times a day   sacubitril-valsartan (Entresto)  MG TABS  Self Yes No   Sig: Take 1 tablet by mouth 2 (two) times a day   temazepam (RESTORIL) 30 mg capsule   Yes No   Sig: Take 30 mg by mouth daily at bedtime      Facility-Administered Medications: None     Discharge Medication List as of 1/3/2025  5:01 PM        START taking these medications    Details   azithromycin (ZITHROMAX) 250 mg tablet Take 1 tablet (250 mg total) by mouth every 24 hours for 4 days, Starting Fri 1/3/2025, Until 2025, Normal      cefpodoxime (VANTIN) 200 mg tablet Take 1 tablet (200 mg total) by mouth 2 (two) times a day for 7 days, Starting Fri 1/3/2025, Until Fri 1/10/2025, Normal           CONTINUE these medications which have NOT CHANGED    Details   ALPRAZolam (XANAX) 0.25 mg tablet Take 0.25 mg by mouth daily as needed, Starting 2023, Historical Med      atorvastatin (LIPITOR) 20 mg tablet Take 1 tablet (20 mg total) by mouth daily, Starting Mon 3/18/2024, Normal      Calcium Carbonate  (Caltrate 600) 1500 (600 Ca) MG TABS Take 1 tablet by mouth 2 (two) times a day, Starting Thu 7/7/2022, Until Mon 6/12/2023, Normal      carvedilol (COREG) 12.5 mg tablet Take 12.5 mg by mouth 2 (two) times a day , Starting Tue 1/30/2018, Historical Med      Cholecalciferol (Vitamin D3) 50 MCG (2000 UT) capsule Take 2,000 Units by mouth daily, Starting Thu 6/6/2024, Historical Med      doxepin (SINEquan) 10 mg capsule TAKE 1 CAPSULE BY MOUTH AT BEDTIME. TAKE WITH TEMAZEPAM, Historical Med      escitalopram (LEXAPRO) 20 mg tablet Take 1 tablet by mouth daily, Starting Mon 12/31/2018, Historical Med      fluticasone (FLONASE) 50 mcg/act nasal spray 2 sprays into each nostril daily, Starting Tue 4/12/2022, Normal      Icosapent Ethyl 1 g CAPS Take 1 capsule by mouth 2 (two) times a day, Starting Thu 4/7/2022, Historical Med      levothyroxine (Euthyrox) 75 mcg tablet Take 1 tablet (75 mcg total) by mouth daily in the early morning, Starting Mon 12/9/2024, Normal      omega-3-acid ethyl esters (LOVAZA) 1 g capsule Take 1 g by mouth 2 (two) times a day, Starting Fri 4/23/2021, Historical Med      sacubitril-valsartan (Entresto)  MG TABS Take 1 tablet by mouth 2 (two) times a day, Starting Wed 10/6/2021, Historical Med      temazepam (RESTORIL) 30 mg capsule Take 30 mg by mouth daily at bedtime, Starting Wed 5/1/2024, Historical Med           No discharge procedures on file.  ED SEPSIS DOCUMENTATION   Time reflects when diagnosis was documented in both MDM as applicable and the Disposition within this note       Time User Action Codes Description Comment    1/3/2025  4:58 PM Roel Enciso Add [J18.9] Pneumonia of right lower lobe due to infectious organism                  Roel Enciso MD  01/04/25 0058

## 2025-01-06 ENCOUNTER — VBI (OUTPATIENT)
Dept: FAMILY MEDICINE CLINIC | Facility: CLINIC | Age: 75
End: 2025-01-06

## 2025-01-06 NOTE — TELEPHONE ENCOUNTER
01/06/25 11:07 AM    Patient contacted post ED visit, phone outreaches were unsuccessful; patient does not have MyChart, a MyChart letter has not been sent.     Thank you.  Karyn Schmitt MA  [facility]

## 2025-01-09 ENCOUNTER — OFFICE VISIT (OUTPATIENT)
Dept: FAMILY MEDICINE CLINIC | Facility: CLINIC | Age: 75
End: 2025-01-09
Payer: COMMERCIAL

## 2025-01-09 VITALS
DIASTOLIC BLOOD PRESSURE: 78 MMHG | HEIGHT: 59 IN | OXYGEN SATURATION: 98 % | HEART RATE: 75 BPM | WEIGHT: 190 LBS | BODY MASS INDEX: 38.3 KG/M2 | RESPIRATION RATE: 18 BRPM | SYSTOLIC BLOOD PRESSURE: 120 MMHG | TEMPERATURE: 98 F

## 2025-01-09 DIAGNOSIS — J18.9 PNEUMONIA OF RIGHT LOWER LOBE DUE TO INFECTIOUS ORGANISM: Primary | ICD-10-CM

## 2025-01-09 DIAGNOSIS — I47.20 VT (VENTRICULAR TACHYCARDIA) (HCC): ICD-10-CM

## 2025-01-09 DIAGNOSIS — E66.01 OBESITY, MORBID (HCC): ICD-10-CM

## 2025-01-09 DIAGNOSIS — I50.22 CHRONIC SYSTOLIC CHF (CONGESTIVE HEART FAILURE) (HCC): ICD-10-CM

## 2025-01-09 DIAGNOSIS — F33.41 RECURRENT MAJOR DEPRESSIVE DISORDER, IN PARTIAL REMISSION (HCC): ICD-10-CM

## 2025-01-09 DIAGNOSIS — F19.982 DRUG-INDUCED INSOMNIA (HCC): ICD-10-CM

## 2025-01-09 PROCEDURE — 99214 OFFICE O/P EST MOD 30 MIN: CPT | Performed by: FAMILY MEDICINE

## 2025-01-09 NOTE — ASSESSMENT & PLAN NOTE
Right lower lobe pneumonia, being treated with double antibiotic Zithromax and Vantin, will repeat chest x-ray in 4 weeks  She is up-to-date on pneumonia vaccine  Orders:  •  XR chest pa and lateral; Future

## 2025-01-09 NOTE — PROGRESS NOTES
Name: Myra Meier      : 1950      MRN: [id number]  Encounter Provider: Raiza Alvarado MD  Encounter Date: 2025   Encounter department: [facility]    Assessment & Plan  Pneumonia of right lower lobe due to infectious organism  Right lower lobe pneumonia, being treated with double antibiotic Zithromax and Vantin, will repeat chest x-ray in 4 weeks  She is up-to-date on pneumonia vaccine  Orders:  •  XR chest pa and lateral; Future    Drug-induced insomnia (HCC)  Sleeping fine now       Chronic systolic CHF (congestive heart failure) (HCC)  Wt Readings from Last 3 Encounters:   25 86.2 kg (190 lb)   24 87.5 kg (193 lb)   24 81.6 kg (180 lb)                  Recurrent major depressive disorder, in partial remission (HCC)  Depression is stable ,being treated for pneumonia now       VT (ventricular tachycardia) (HCC)  Heart rate is normal       Obesity, morbid (HCC)  Weight has been stable, 190 pounds            History of Present Illness     She went to urgent care and was sent to the ER as she was having low pulse ox, cough and pneumonia on chest x-ray, in the ER she was given Rocephin and azithromycin injectable and discharged her on oral medications Zithromax and cefpodoxime, she feels much better, cough is dry and clear mucus sometimes, sleeping better, no shortness of breath has been using inhaler twice a day,      Review of Systems   Constitutional: Negative.  Negative for chills, fatigue and fever.   HENT: Negative.     Eyes: Negative.    Respiratory:  Positive for cough.    Cardiovascular: Negative.    Gastrointestinal: Negative.    Musculoskeletal: Negative.    Hematological: Negative.    Psychiatric/Behavioral: Negative.       Past Medical History:   Diagnosis Date   • Anxiety    • Cardiac pacemaker     Has had pacemaker since , new pacemaker implanted this past fall 2020.   • Herpes zoster     onset: 12/6/10   • History of urinary tract infection      Pt states with UTI back in December- treated with ATB- no further symptoms at this time.   • Hyperlipidemia    • Hypertension    • Hypothyroidism     onset: 12   • PONV (postoperative nausea and vomiting)    • Vertigo     onset: 12     Past Surgical History:   Procedure Laterality Date   • CARDIAC PACEMAKER PLACEMENT  2012    ICD-Biotronik-Lumax   •  SECTION     • NM EXCISION TUMOR SOFT TISSUE SHOULDER SUBQ 3 CM/> Right 2021    Procedure: EXCISION BIOPSY TISSUE LESION/MASS UPPER EXTREMITY;  Surgeon: Jose Chaudhary MD;  Location:  MAIN OR;  Service: General     Family History   Adopted: Yes   Problem Relation Age of Onset   • Cancer Mother    • Thyroid cancer Sister    • Cancer Sister    • Cancer Brother      Social History     Tobacco Use   • Smoking status: Never   • Smokeless tobacco: Never   • Tobacco comments:     Denies    Vaping Use   • Vaping status: Never Used   Substance and Sexual Activity   • Alcohol use: No     Comment: Denies    • Drug use: No     Comment: Denies    • Sexual activity: Not Currently     Comment: Denies      Current Outpatient Medications on File Prior to Visit   Medication Sig   • ALPRAZolam (XANAX) 0.25 mg tablet Take 0.25 mg by mouth daily as needed   • atorvastatin (LIPITOR) 20 mg tablet Take 1 tablet (20 mg total) by mouth daily   • Calcium Carbonate (Caltrate 600) 1500 (600 Ca) MG TABS Take 1 tablet by mouth 2 (two) times a day   • carvedilol (COREG) 12.5 mg tablet Take 12.5 mg by mouth 2 (two) times a day    • cefpodoxime (VANTIN) 200 mg tablet Take 1 tablet (200 mg total) by mouth 2 (two) times a day for 7 days   • Cholecalciferol (Vitamin D3) 50 MCG (2000 UT) capsule Take 2,000 Units by mouth daily   • doxepin (SINEquan) 10 mg capsule TAKE 1 CAPSULE BY MOUTH AT BEDTIME. TAKE WITH TEMAZEPAM   • escitalopram (LEXAPRO) 20 mg tablet Take 1 tablet by mouth daily   • fluticasone (FLONASE) 50 mcg/act nasal spray 2 sprays into each nostril daily   •  "Icosapent Ethyl 1 g CAPS Take 1 capsule by mouth 2 (two) times a day   • levothyroxine (Euthyrox) 75 mcg tablet Take 1 tablet (75 mcg total) by mouth daily in the early morning   • omega-3-acid ethyl esters (LOVAZA) 1 g capsule Take 1 g by mouth 2 (two) times a day   • sacubitril-valsartan (Entresto)  MG TABS Take 1 tablet by mouth 2 (two) times a day   • temazepam (RESTORIL) 30 mg capsule Take 30 mg by mouth daily at bedtime     Allergies   Allergen Reactions   • Doxycycline GI Intolerance     Pt states with upset stomach   • Amoxicillin GI Intolerance     Upset stomach     Immunization History   Administered Date(s) Administered   • COVID-19 PFIZER VACCINE 0.3 ML IM 07/20/2021, 08/11/2021, 02/18/2022   • INFLUENZA 10/18/2019, 10/03/2021, 09/07/2023   • Influenza, high dose seasonal 0.7 mL 10/30/2018, 01/15/2021, 10/20/2022   • Pneumococcal Conjugate 13-Valent 04/28/2016   • Pneumococcal Conjugate PCV 7 04/04/2017   • Pneumococcal Polysaccharide PPV23 08/19/2017   • Tdap 01/11/2016   • Zoster Vaccine Recombinant 06/13/2021, 10/03/2021   • influenza, trivalent, adjuvanted 10/30/2018, 10/18/2019, 01/15/2021, 10/20/2022     Objective   /78   Pulse 75   Temp 98 °F (36.7 °C)   Resp 18   Ht 4' 11\" (1.499 m)   Wt 86.2 kg (190 lb)   SpO2 98%   BMI 38.38 kg/m²     Physical Exam  Vitals and nursing note reviewed.   Constitutional:       Appearance: Normal appearance.   Eyes:      Extraocular Movements: Extraocular movements intact.   Cardiovascular:      Rate and Rhythm: Normal rate.      Heart sounds: No murmur heard.  Pulmonary:      Effort: Pulmonary effort is normal.   Musculoskeletal:      Cervical back: Normal range of motion.         "

## 2025-01-09 NOTE — ASSESSMENT & PLAN NOTE
Wt Readings from Last 3 Encounters:   01/09/25 86.2 kg (190 lb)   12/09/24 87.5 kg (193 lb)   09/19/24 81.6 kg (180 lb)

## 2025-02-08 PROBLEM — J18.9 PNEUMONIA OF RIGHT LOWER LOBE DUE TO INFECTIOUS ORGANISM: Status: RESOLVED | Noted: 2025-01-09 | Resolved: 2025-02-08

## 2025-05-02 ENCOUNTER — VBI (OUTPATIENT)
Dept: ADMINISTRATIVE | Facility: OTHER | Age: 75
End: 2025-05-02

## 2025-05-02 NOTE — TELEPHONE ENCOUNTER
05/02/25 3:17 PM     Chart reviewed for Mammogram ; nothing is submitted to the patient's insurance at this time.     Ana Vale   [facility]

## 2025-06-09 LAB
EST. AVERAGE GLUCOSE BLD GHB EST-MCNC: 120 MG/DL
HBA1C MFR BLD: 5.8 %
TSH SERPL-ACNC: 0.98 UIU/ML (ref 0.45–5.33)

## 2025-06-16 ENCOUNTER — OFFICE VISIT (OUTPATIENT)
Dept: FAMILY MEDICINE CLINIC | Facility: CLINIC | Age: 75
End: 2025-06-16
Payer: COMMERCIAL

## 2025-06-16 VITALS
SYSTOLIC BLOOD PRESSURE: 120 MMHG | OXYGEN SATURATION: 96 % | RESPIRATION RATE: 16 BRPM | HEART RATE: 78 BPM | BODY MASS INDEX: 37.7 KG/M2 | HEIGHT: 59 IN | WEIGHT: 187 LBS | DIASTOLIC BLOOD PRESSURE: 70 MMHG

## 2025-06-16 DIAGNOSIS — J30.89 SEASONAL ALLERGIC RHINITIS DUE TO OTHER ALLERGIC TRIGGER: ICD-10-CM

## 2025-06-16 DIAGNOSIS — R73.01 IMPAIRED FASTING GLUCOSE: ICD-10-CM

## 2025-06-16 DIAGNOSIS — I50.22 CHRONIC SYSTOLIC CHF (CONGESTIVE HEART FAILURE) (HCC): ICD-10-CM

## 2025-06-16 DIAGNOSIS — J30.1 SEASONAL ALLERGIC RHINITIS DUE TO POLLEN: ICD-10-CM

## 2025-06-16 DIAGNOSIS — E78.2 MIXED HYPERLIPIDEMIA: ICD-10-CM

## 2025-06-16 DIAGNOSIS — H43.393 VITREOUS FLOATERS OF BOTH EYES: ICD-10-CM

## 2025-06-16 DIAGNOSIS — E03.9 HYPOTHYROIDISM, UNSPECIFIED TYPE: ICD-10-CM

## 2025-06-16 DIAGNOSIS — Z00.00 MEDICARE ANNUAL WELLNESS VISIT, SUBSEQUENT: Primary | ICD-10-CM

## 2025-06-16 PROCEDURE — G0439 PPPS, SUBSEQ VISIT: HCPCS | Performed by: FAMILY MEDICINE

## 2025-06-16 PROCEDURE — 99214 OFFICE O/P EST MOD 30 MIN: CPT | Performed by: FAMILY MEDICINE

## 2025-06-16 PROCEDURE — G2211 COMPLEX E/M VISIT ADD ON: HCPCS | Performed by: FAMILY MEDICINE

## 2025-06-16 RX ORDER — FLUTICASONE PROPIONATE 50 MCG
2 SPRAY, SUSPENSION (ML) NASAL DAILY
Qty: 16 G | Refills: 2 | Status: SHIPPED | OUTPATIENT
Start: 2025-06-16

## 2025-06-16 NOTE — PATIENT INSTRUCTIONS
Medicare Preventive Visit Patient Instructions  Thank you for completing your Welcome to Medicare Visit or Medicare Annual Wellness Visit today. Your next wellness visit will be due in one year (6/17/2026).  The screening/preventive services that you may require over the next 5-10 years are detailed below. Some tests may not apply to you based off risk factors and/or age. Screening tests ordered at today's visit but not completed yet may show as past due. Also, please note that scanned in results may not display below.  Preventive Screenings:  Service Recommendations Previous Testing/Comments   Colorectal Cancer Screening  * Colonoscopy    * Fecal Occult Blood Test (FOBT)/Fecal Immunochemical Test (FIT)  * Fecal DNA/Cologuard Test  * Flexible Sigmoidoscopy Age: 45-75 years old   Colonoscopy: every 10 years (may be performed more frequently if at higher risk)  OR  FOBT/FIT: every 1 year  OR  Cologuard: every 3 years  OR  Sigmoidoscopy: every 5 years  Screening may be recommended earlier than age 45 if at higher risk for colorectal cancer. Also, an individualized decision between you and your healthcare provider will decide whether screening between the ages of 76-85 would be appropriate. Colonoscopy: 10/04/2022  FOBT/FIT: Not on file  Cologuard: Not on file  Sigmoidoscopy: Not on file    Screening Current     Breast Cancer Screening Age: 40+ years old  Frequency: every 1-2 years  Not required if history of left and right mastectomy Mammogram: 07/02/2019    Screening Current  Patient Declines   Cervical Cancer Screening Between the ages of 21-29, pap smear recommended once every 3 years.   Between the ages of 30-65, can perform pap smear with HPV co-testing every 5 years.   Recommendations may differ for women with a history of total hysterectomy, cervical cancer, or abnormal pap smears in past. Pap Smear: Not on file    Screening Not Indicated   Hepatitis C Screening Once for adults born between 1945 and 1965  More  frequently in patients at high risk for Hepatitis C Hep C Antibody: 11/11/2020    Screening Current   Diabetes Screening 1-2 times per year if you're at risk for diabetes or have pre-diabetes Fasting glucose: No results in last 5 years (No results in last 5 years)  A1C: 5.8 % (6/9/2025)  Screening Current   Cholesterol Screening Once every 5 years if you don't have a lipid disorder. May order more often based on risk factors. Lipid panel: 04/16/2025    Screening Not Indicated  History Lipid Disorder     Other Preventive Screenings Covered by Medicare:  Abdominal Aortic Aneurysm (AAA) Screening: covered once if your at risk. You're considered to be at risk if you have a family history of AAA.  Lung Cancer Screening: covers low dose CT scan once per year if you meet all of the following conditions: (1) Age 55-77; (2) No signs or symptoms of lung cancer; (3) Current smoker or have quit smoking within the last 15 years; (4) You have a tobacco smoking history of at least 20 pack years (packs per day multiplied by number of years you smoked); (5) You get a written order from a healthcare provider.  Glaucoma Screening: covered annually if you're considered high risk: (1) You have diabetes OR (2) Family history of glaucoma OR (3)  aged 50 and older OR (4)  American aged 65 and older  Osteoporosis Screening: covered every 2 years if you meet one of the following conditions: (1) You're estrogen deficient and at risk for osteoporosis based off medical history and other findings; (2) Have a vertebral abnormality; (3) On glucocorticoid therapy for more than 3 months; (4) Have primary hyperparathyroidism; (5) On osteoporosis medications and need to assess response to drug therapy.   Last bone density test (DXA Scan): 09/19/2024.  HIV Screening: covered annually if you're between the age of 15-65. Also covered annually if you are younger than 15 and older than 65 with risk factors for HIV infection. For  pregnant patients, it is covered up to 3 times per pregnancy.    Immunizations:  Immunization Recommendations   Influenza Vaccine Annual influenza vaccination during flu season is recommended for all persons aged >= 6 months who do not have contraindications   Pneumococcal Vaccine   * Pneumococcal conjugate vaccine = PCV13 (Prevnar 13), PCV15 (Vaxneuvance), PCV20 (Prevnar 20)  * Pneumococcal polysaccharide vaccine = PPSV23 (Pneumovax) Adults 19-63 yo with certain risk factors or if 65+ yo  If never received any pneumonia vaccine: recommend Prevnar 20 (PCV20)  Give PCV20 if previously received 1 dose of PCV13 or PPSV23   Hepatitis B Vaccine 3 dose series if at intermediate or high risk (ex: diabetes, end stage renal disease, liver disease)   Respiratory syncytial virus (RSV) Vaccine - COVERED BY MEDICARE PART D  * RSVPreF3 (Arexvy) CDC recommends that adults 60 years of age and older may receive a single dose of RSV vaccine using shared clinical decision-making (SCDM)   Tetanus (Td) Vaccine - COST NOT COVERED BY MEDICARE PART B Following completion of primary series, a booster dose should be given every 10 years to maintain immunity against tetanus. Td may also be given as tetanus wound prophylaxis.   Tdap Vaccine - COST NOT COVERED BY MEDICARE PART B Recommended at least once for all adults. For pregnant patients, recommended with each pregnancy.   Shingles Vaccine (Shingrix) - COST NOT COVERED BY MEDICARE PART B  2 shot series recommended in those 19 years and older who have or will have weakened immune systems or those 50 years and older     Health Maintenance Due:      Topic Date Due   • Breast Cancer Screening: Mammogram  07/02/2020   • Colorectal Cancer Screening  10/03/2025   • Hepatitis C Screening  Completed     Immunizations Due:      Topic Date Due   • COVID-19 Vaccine (4 - 2024-25 season) 09/01/2024   • Influenza Vaccine (Season Ended) 09/01/2025     Advance Directives   What are advance directives?   Advance directives are legal documents that state your wishes and plans for medical care. These plans are made ahead of time in case you lose your ability to make decisions for yourself. Advance directives can apply to any medical decision, such as the treatments you want, and if you want to donate organs.   What are the types of advance directives?  There are many types of advance directives, and each state has rules about how to use them. You may choose a combination of any of the following:  Living will:  This is a written record of the treatment you want. You can also choose which treatments you do not want, which to limit, and which to stop at a certain time. This includes surgery, medicine, IV fluid, and tube feedings.   Durable power of  for healthcare (DPAHC):  This is a written record that states who you want to make healthcare choices for you when you are unable to make them for yourself. This person, called a proxy, is usually a family member or a friend. You may choose more than 1 proxy.  Do not resuscitate (DNR) order:  A DNR order is used in case your heart stops beating or you stop breathing. It is a request not to have certain forms of treatment, such as CPR. A DNR order may be included in other types of advance directives.  Medical directive:  This covers the care that you want if you are in a coma, near death, or unable to make decisions for yourself. You can list the treatments you want for each condition. Treatment may include pain medicine, surgery, blood transfusions, dialysis, IV or tube feedings, and a ventilator (breathing machine).  Values history:  This document has questions about your views, beliefs, and how you feel and think about life. This information can help others choose the care that you would choose.  Why are advance directives important?  An advance directive helps you control your care. Although spoken wishes may be used, it is better to have your wishes written down.  Spoken wishes can be misunderstood, or not followed. Treatments may be given even if you do not want them. An advance directive may make it easier for your family to make difficult choices about your care.   Weight Management   Why it is important to manage your weight:  Being overweight increases your risk of health conditions such as heart disease, high blood pressure, type 2 diabetes, and certain types of cancer. It can also increase your risk for osteoarthritis, sleep apnea, and other respiratory problems. Aim for a slow, steady weight loss. Even a small amount of weight loss can lower your risk of health problems.  How to lose weight safely:  A safe and healthy way to lose weight is to eat fewer calories and get regular exercise. You can lose up about 1 pound a week by decreasing the number of calories you eat by 500 calories each day.   Healthy meal plan for weight management:  A healthy meal plan includes a variety of foods, contains fewer calories, and helps you stay healthy. A healthy meal plan includes the following:  Eat whole-grain foods more often.  A healthy meal plan should contain fiber. Fiber is the part of grains, fruits, and vegetables that is not broken down by your body. Whole-grain foods are healthy and provide extra fiber in your diet. Some examples of whole-grain foods are whole-wheat breads and pastas, oatmeal, brown rice, and bulgur.  Eat a variety of vegetables every day.  Include dark, leafy greens such as spinach, kale, sergio greens, and mustard greens. Eat yellow and orange vegetables such as carrots, sweet potatoes, and winter squash.   Eat a variety of fruits every day.  Choose fresh or canned fruit (canned in its own juice or light syrup) instead of juice. Fruit juice has very little or no fiber.  Eat low-fat dairy foods.  Drink fat-free (skim) milk or 1% milk. Eat fat-free yogurt and low-fat cottage cheese. Try low-fat cheeses such as mozzarella and other reduced-fat  cheeses.  Choose meat and other protein foods that are low in fat.  Choose beans or other legumes such as split peas or lentils. Choose fish, skinless poultry (chicken or turkey), or lean cuts of red meat (beef or pork). Before you cook meat or poultry, cut off any visible fat.   Use less fat and oil.  Try baking foods instead of frying them. Add less fat, such as margarine, sour cream, regular salad dressing and mayonnaise to foods. Eat fewer high-fat foods. Some examples of high-fat foods include french fries, doughnuts, ice cream, and cakes.  Eat fewer sweets.  Limit foods and drinks that are high in sugar. This includes candy, cookies, regular soda, and sweetened drinks.  Exercise:  Exercise at least 30 minutes per day on most days of the week. Some examples of exercise include walking, biking, dancing, and swimming. You can also fit in more physical activity by taking the stairs instead of the elevator or parking farther away from stores. Ask your healthcare provider about the best exercise plan for you.    © Copyright [employer] 2018 Information is for End User's use only and may not be sold, redistributed or otherwise used for commercial purposes. All illustrations and images included in CareNotes® are the copyrighted property of [employer] Inc. or [employer]

## 2025-06-16 NOTE — ASSESSMENT & PLAN NOTE
Wt Readings from Last 3 Encounters:   06/16/25 84.8 kg (187 lb)   01/09/25 86.2 kg (190 lb)   12/09/24 87.5 kg (193 lb)               Orders:  •  CBC and differential; Future  •  Comprehensive metabolic panel; Future

## 2025-06-16 NOTE — PROGRESS NOTES
Name: Myra Meier      : 1950      MRN: [id number]  Encounter Provider: Raiza Alvarado MD  Encounter Date: 2025   Encounter department: [facility]  :  Assessment & Plan  Medicare annual wellness visit, subsequent         BMI 37.0-37.9, adult         Vitreous floaters of both eyes  Neurologist  Orders:  •  Ambulatory Referral to Ophthalmology; Future    Seasonal allergic rhinitis due to other allergic trigger   will start the Flonase and Claritin       Seasonal allergic rhinitis due to pollen    Orders:  •  fluticasone (FLONASE) 50 mcg/act nasal spray; 2 sprays into each nostril daily    Chronic systolic CHF (congestive heart failure) (HCC)  Wt Readings from Last 3 Encounters:   25 84.8 kg (187 lb)   25 86.2 kg (190 lb)   24 87.5 kg (193 lb)               Orders:  •  CBC and differential; Future  •  Comprehensive metabolic panel; Future    Impaired fasting glucose    Orders:  •  Comprehensive metabolic panel; Future  •  Hemoglobin A1C; Future    Mixed hyperlipidemia    Orders:  •  Lipid panel; Future    Hypothyroidism, unspecified type    Orders:  •  TSH, 3rd generation; Future       Preventive health issues were discussed with patient, and age appropriate screening tests were ordered as noted in patient's After Visit Summary. Personalized health advice and appropriate referrals for health education or preventive services given if needed, as noted in patient's After Visit Summary.    History of Present Illness     AWV, and follow up , see spots in front of eye sometimes   Lot of nasal congestion ,clear discharge for 2 wks , no fever ,   Has congestive heart failure doing fine, ICD is working good       Patient Care Team:  Raiza Alvarado MD as PCP - General  MD Yadiel Gutiérrez MD    Review of Systems   Constitutional:  Negative for activity change, appetite change, chills, fatigue, fever and unexpected weight change.   HENT:  Negative for  congestion, ear discharge, ear pain, nosebleeds, postnasal drip, rhinorrhea, sinus pressure, sneezing, sore throat, trouble swallowing and voice change.    Eyes:  Negative for photophobia, pain, discharge, redness and itching.   Respiratory:  Negative for cough, chest tightness, shortness of breath and wheezing.    Cardiovascular:  Negative for chest pain, palpitations and leg swelling.   Gastrointestinal:  Negative for abdominal pain, constipation, diarrhea, nausea and vomiting.   Endocrine: Negative for polyuria.   Genitourinary:  Negative for dysuria, frequency and urgency.   Musculoskeletal:  Negative for arthralgias, back pain, myalgias and neck pain.   Skin:  Negative for color change, pallor and rash.   Allergic/Immunologic: Negative for environmental allergies and food allergies.   Neurological:  Negative for dizziness, weakness, light-headedness and headaches.   Hematological:  Negative for adenopathy. Does not bruise/bleed easily.   Psychiatric/Behavioral:  Negative for behavioral problems. The patient is not nervous/anxious.      Medical History Reviewed by provider this encounter:  Tobacco  Allergies  Meds  Problems  Med Hx  Surg Hx  Fam Hx       Annual Wellness Visit Questionnaire   Myra is here for her Subsequent Wellness visit.     Health Risk Assessment:   Patient rates overall health as good. Patient feels that their physical health rating is same. Patient is satisfied with their life. Eyesight was rated as same. Hearing was rated as same. Patient feels that their emotional and mental health rating is slightly better. Patients states they are never, rarely angry. Patient states they are sometimes unusually tired/fatigued. Pain experienced in the last 7 days has been none.     Fall Risk Screening:   In the past year, patient has experienced: no history of falling in past year      Home Safety:  Patient does not have trouble with stairs inside or outside of their home. Patient has working  smoke alarms and has working carbon monoxide detector. Home safety hazards include: none.     Nutrition:   Current diet is Regular.     Medications:   Patient is not currently taking any over-the-counter supplements. Patient is able to manage medications.     Activities of Daily Living (ADLs)/Instrumental Activities of Daily Living (IADLs):   Walk and transfer into and out of bed and chair?: Yes  Dress and groom yourself?: Yes    Bathe or shower yourself?: Yes    Feed yourself? Yes  Do your laundry/housekeeping?: Yes  Manage your money, pay your bills and track your expenses?: Yes  Make your own meals?: Yes    Do your own shopping?: Yes    Previous Hospitalizations:   Any hospitalizations or ED visits within the last 12 months?: No      Advance Care Planning:   Living will: No    Durable POA for healthcare: No    Advanced directive: No    Advanced directive counseling given: Yes      Cognitive Screening:   Provider or family/friend/caregiver concerned regarding cognition?: No    Preventive Screenings      Cardiovascular Screening:    General: History Lipid Disorder and Screening Current      Diabetes Screening:     General: Screening Current      Colorectal Cancer Screening:     General: Screening Current      Breast Cancer Screening:     General: Screening Current and Patient Declines      Cervical Cancer Screening:    General: Screening Not Indicated      Osteoporosis Screening:    General: Risks and Benefits Discussed    Due for: Bone Density Ultrasound      Abdominal Aortic Aneurysm (AAA) Screening:        General: Screening Not Indicated      Lung Cancer Screening:     General: Screening Not Indicated      Hepatitis C Screening:    General: Screening Current    Screening, Brief Intervention, and Referral to Treatment (SBIRT)     Screening  Typical number of drinks in a day: 0  Typical number of drinks in a week: 0  Interpretation: Low risk drinking behavior.    Other Counseling Topics:   Calcium and vitamin D  "intake and regular weightbearing exercise.     Social Drivers of Health     Financial Resource Strain: Unknown (4/17/2023)    Overall Financial Resource Strain (CARDIA)    • Difficulty of Paying Living Expenses: Patient declined   Food Insecurity: No Food Insecurity (6/16/2025)    Nursing - Inadequate Food Risk Classification    • Worried About Running Out of Food in the Last Year: Never true    • Ran Out of Food in the Last Year: Never true   Transportation Needs: No Transportation Needs (6/16/2025)    PRAPARE - Transportation    • Lack of Transportation (Medical): No    • Lack of Transportation (Non-Medical): No   Housing Stability: Unknown (6/16/2025)    Housing Stability Vital Sign    • Unable to Pay for Housing in the Last Year: No    • Homeless in the Last Year: No   Utilities: Not At Risk (6/16/2025)    [facility] Utilities    • Threatened with loss of utilities: No     No results found.    Objective   /70   Pulse 78   Resp 16   Ht 4' 11\" (1.499 m)   Wt 84.8 kg (187 lb)   SpO2 96%   BMI 37.77 kg/m²     Physical Exam  Vitals and nursing note reviewed.   Constitutional:       General: She is not in acute distress.     Appearance: Normal appearance. She is not ill-appearing.   HENT:      Head: Normocephalic and atraumatic.      Right Ear: Tympanic membrane and ear canal normal. There is no impacted cerumen.      Left Ear: Tympanic membrane and ear canal normal. There is no impacted cerumen.      Nose: Nose normal. No congestion or rhinorrhea.      Mouth/Throat:      Mouth: Mucous membranes are moist.      Pharynx: Oropharynx is clear. No oropharyngeal exudate or posterior oropharyngeal erythema.     Eyes:      General: No scleral icterus.        Right eye: No discharge.         Left eye: No discharge.      Extraocular Movements: Extraocular movements intact.      Conjunctiva/sclera: Conjunctivae normal.      Pupils: Pupils are equal, round, and reactive to light.       Cardiovascular:      Rate and Rhythm: " Normal rate and regular rhythm.      Heart sounds: Normal heart sounds. No murmur heard.  Pulmonary:      Effort: Pulmonary effort is normal.      Breath sounds: Normal breath sounds. No wheezing or rales.   Abdominal:      General: Abdomen is flat. Bowel sounds are normal. There is no distension.      Palpations: Abdomen is soft. There is no mass.      Tenderness: There is no abdominal tenderness.     Musculoskeletal:         General: No swelling, tenderness or deformity. Normal range of motion.      Cervical back: Normal range of motion and neck supple. No muscular tenderness.      Right lower leg: No edema.      Left lower leg: No edema.   Lymphadenopathy:      Cervical: No cervical adenopathy.     Skin:     Coloration: Skin is not jaundiced or pale.      Findings: No erythema, lesion or rash.     Neurological:      General: No focal deficit present.      Mental Status: She is alert and oriented to person, place, and time.      Gait: Gait normal.     Psychiatric:         Mood and Affect: Mood normal.         Behavior: Behavior normal.

## 2025-07-07 ENCOUNTER — OFFICE VISIT (OUTPATIENT)
Age: 75
End: 2025-07-07
Attending: FAMILY MEDICINE
Payer: COMMERCIAL

## 2025-07-07 DIAGNOSIS — H43.813 PVD (POSTERIOR VITREOUS DETACHMENT), BILATERAL: ICD-10-CM

## 2025-07-07 DIAGNOSIS — H25.13 NUCLEAR SCLEROSIS OF BOTH EYES: Primary | ICD-10-CM

## 2025-07-07 PROCEDURE — 92201 OPSCPY EXTND RTA DRAW UNI/BI: CPT | Performed by: OPHTHALMOLOGY

## 2025-07-07 PROCEDURE — 99204 OFFICE O/P NEW MOD 45 MIN: CPT | Performed by: OPHTHALMOLOGY

## 2025-07-07 PROCEDURE — 92134 CPTRZ OPH DX IMG PST SGM RTA: CPT | Performed by: OPHTHALMOLOGY

## 2025-07-07 NOTE — ASSESSMENT & PLAN NOTE
Not visually significant at this time. Continue to monitor; Pt denies any difficulty with glare or decrease in vision.    Orders:    Ambulatory Referral to Ophthalmology

## 2025-07-07 NOTE — PROGRESS NOTES
Name: Myra Meier      : 1950      MRN: [id number]  Encounter Provider: Anisa Barber MD  Encounter Date: 2025   Encounter department: [facility] OPHTHALMOLOGY  :  Assessment & Plan  Nuclear sclerosis of both eyes  Not visually significant at this time. Continue to monitor; Pt denies any difficulty with glare or decrease in vision.    Orders:    Ambulatory Referral to Ophthalmology    PVD (posterior vitreous detachment), bilateral  Posterior vitreous detachment with basurto ring both eyes: symptomatic. Negative kyle's sign. No retinal tears, breaks, or detachments on dilated examination with scleral depression 360 degrees.  Retinal detachment precautions were discussed with the patient. The patient was instructed to call or return immediately for an increase in flashes of light, floaters (dark spots in vision), or curtaining of the visual field.     Orders:    Fundus Photos - OU - Both Eyes    OCT, Retina - OU - Both Eyes            Myra Meier is a 74 y.o. female who presents for floaters.    Patient states floaters started 2 years ago. They come and go.    Patient states she has not been to an eye doctor in more than 8 years.    Patient denies any vision changes.  History obtained from: patient    Review of Systems  Medical History Reviewed by provider this encounter:  Tobacco  Allergies  Meds  Problems  Med Hx  Surg Hx  Fam Hx     .     Past Ocular History: floaters  Ocular Meds/Drops: none    Base Eye Exam       Visual Acuity (Snellen - Linear)         Right Left    Dist sc 20/25-1 20/30-1              Tonometry (Tonopen, 2:18 PM)         Right Left    Pressure 13 12              Pupils         Pupils APD    Right PERRL None    Left PERRL None              Visual Fields         Left Right     Full Full              Extraocular Movement         Right Left     Full Full              Neuro/Psych       Oriented x3: Yes    Mood/Affect: Normal              Dilation       Both eyes: 2.5%  Phenylephrine, 1% Tropicamide @ 2:19 PM                  Slit Lamp and Fundus Exam       External Exam         Right Left    External Normal Normal              Slit Lamp Exam         Right Left    Lids/Lashes Normal Normal    Conjunctiva/Sclera White and quiet White and quiet    Cornea Clear Clear    Anterior Chamber Deep and quiet Deep and quiet    Iris Round and reactive Round and reactive    Lens 2+ Nuclear sclerosis 2+ Nuclear sclerosis    Anterior Vitreous PVD PVD              Fundus Exam         Right Left    Disc sharp, no pallor sharp, no pallor    C/D Ratio 0.25 0.25    Macula Good foveal reflex Good foveal reflex    Vessels normal in caliber normal in caliber    Periphery Flat, no retinal tear or detachment, no masses Flat, no retinal tear or detachment, no masses                      IMAGING:

## 2025-07-09 DIAGNOSIS — E03.9 HYPOTHYROIDISM, UNSPECIFIED TYPE: ICD-10-CM

## 2025-07-10 RX ORDER — LEVOTHYROXINE SODIUM 75 UG/1
TABLET ORAL
Qty: 100 TABLET | Refills: 1 | Status: SHIPPED | OUTPATIENT
Start: 2025-07-10

## 2025-07-16 PROBLEM — Z00.00 MEDICARE ANNUAL WELLNESS VISIT, SUBSEQUENT: Status: RESOLVED | Noted: 2019-10-30 | Resolved: 2025-07-16

## (undated) DEVICE — MEDI-VAC YANK SUCT HNDL W/TPRD BULBOUS TIP: Brand: CARDINAL HEALTH

## (undated) DEVICE — NEEDLE 25G X 1 1/2

## (undated) DEVICE — GLOVE INDICATOR PI UNDERGLOVE SZ 8 BLUE

## (undated) DEVICE — PAD GROUNDING ADULT

## (undated) DEVICE — GLOVE SRG BIOGEL ECLIPSE 7.5

## (undated) DEVICE — PLUMEPEN PRO 10FT

## (undated) DEVICE — OCCLUSIVE GAUZE STRIP,3% BISMUTH TRIBROMOPHENATE IN PETROLATUM BLEND: Brand: XEROFORM

## (undated) DEVICE — ADHESIVE SKN CLSR HISTOACRYL FLEX 0.5ML LF

## (undated) DEVICE — 3M™ TEGADERM™ TRANSPARENT FILM DRESSING FRAME STYLE, 1626W, 4 IN X 4-3/4 IN (10 CM X 12 CM), 50/CT 4CT/CASE: Brand: 3M™ TEGADERM™

## (undated) DEVICE — SUT VICRYL 2-0 SH 27 IN UNDYED J417H

## (undated) DEVICE — CHLORAPREP HI-LITE 26ML ORANGE

## (undated) DEVICE — SUT MONOCRYL 4-0 PS-2 18 IN Y496G

## (undated) DEVICE — INTENDED FOR TISSUE SEPARATION, AND OTHER PROCEDURES THAT REQUIRE A SHARP SURGICAL BLADE TO PUNCTURE OR CUT.: Brand: BARD-PARKER SAFETY BLADES SIZE 15, STERILE

## (undated) DEVICE — BETHLEHEM UNIVERSAL MINOR GEN: Brand: CARDINAL HEALTH